# Patient Record
Sex: FEMALE | Race: WHITE
[De-identification: names, ages, dates, MRNs, and addresses within clinical notes are randomized per-mention and may not be internally consistent; named-entity substitution may affect disease eponyms.]

---

## 2017-09-18 ENCOUNTER — HOSPITAL ENCOUNTER (INPATIENT)
Dept: HOSPITAL 74 - JER | Age: 37
LOS: 5 days | Discharge: HOME | DRG: 391 | End: 2017-09-23
Attending: NURSE PRACTITIONER | Admitting: INTERNAL MEDICINE
Payer: COMMERCIAL

## 2017-09-18 VITALS — BODY MASS INDEX: 27.7 KG/M2

## 2017-09-18 DIAGNOSIS — F17.210: ICD-10-CM

## 2017-09-18 DIAGNOSIS — K70.9: ICD-10-CM

## 2017-09-18 DIAGNOSIS — K85.90: ICD-10-CM

## 2017-09-18 DIAGNOSIS — E83.42: ICD-10-CM

## 2017-09-18 DIAGNOSIS — R10.11: ICD-10-CM

## 2017-09-18 DIAGNOSIS — F10.129: ICD-10-CM

## 2017-09-18 DIAGNOSIS — K29.50: ICD-10-CM

## 2017-09-18 DIAGNOSIS — E87.6: ICD-10-CM

## 2017-09-18 DIAGNOSIS — K76.0: ICD-10-CM

## 2017-09-18 DIAGNOSIS — K21.9: Primary | ICD-10-CM

## 2017-09-18 DIAGNOSIS — D50.9: ICD-10-CM

## 2017-09-18 PROCEDURE — G0009 ADMIN PNEUMOCOCCAL VACCINE: HCPCS

## 2017-09-18 PROCEDURE — G0008 ADMIN INFLUENZA VIRUS VAC: HCPCS

## 2017-09-18 PROCEDURE — G0378 HOSPITAL OBSERVATION PER HR: HCPCS

## 2017-09-19 LAB
ALBUMIN SERPL-MCNC: 2.8 G/DL (ref 3.4–5)
ALBUMIN SERPL-MCNC: 3.4 G/DL (ref 3.4–5)
ALP SERPL-CCNC: 198 U/L (ref 45–117)
ALP SERPL-CCNC: 231 U/L (ref 45–117)
ALT SERPL-CCNC: 179 U/L (ref 12–78)
ALT SERPL-CCNC: 212 U/L (ref 12–78)
ANION GAP SERPL CALC-SCNC: 11 MMOL/L (ref 8–16)
ANION GAP SERPL CALC-SCNC: 5 MMOL/L (ref 8–16)
ANISOCYTOSIS BLD QL: (no result)
APPEARANCE UR: CLEAR
AST SERPL-CCNC: 284 U/L (ref 15–37)
AST SERPL-CCNC: 507 U/L (ref 15–37)
BASOPHILS # BLD: 0.5 % (ref 0–2)
BASOPHILS # BLD: 0.8 % (ref 0–2)
BILIRUB SERPL-MCNC: 0.7 MG/DL (ref 0.2–1)
BILIRUB SERPL-MCNC: 0.9 MG/DL (ref 0.2–1)
BILIRUB UR STRIP.AUTO-MCNC: NEGATIVE MG/DL
CALCIUM SERPL-MCNC: 7.4 MG/DL (ref 8.5–10.1)
CALCIUM SERPL-MCNC: 7.9 MG/DL (ref 8.5–10.1)
CO2 SERPL-SCNC: 25 MMOL/L (ref 21–32)
CO2 SERPL-SCNC: 27 MMOL/L (ref 21–32)
COLOR UR: (no result)
CREAT SERPL-MCNC: 0.5 MG/DL (ref 0.55–1.02)
CREAT SERPL-MCNC: 0.7 MG/DL (ref 0.55–1.02)
DEPRECATED RDW RBC AUTO: 20.5 % (ref 11.6–15.6)
DEPRECATED RDW RBC AUTO: 21.2 % (ref 11.6–15.6)
EOSINOPHIL # BLD: 1.3 % (ref 0–4.5)
EOSINOPHIL # BLD: 1.8 % (ref 0–4.5)
GLUCOSE SERPL-MCNC: 64 MG/DL (ref 74–106)
GLUCOSE SERPL-MCNC: 84 MG/DL (ref 74–106)
HYPOCHROMIA BLD QL SMEAR: (no result)
KETONES UR QL STRIP: NEGATIVE
LEUKOCYTE ESTERASE UR QL STRIP.AUTO: NEGATIVE
MACROCYTES BLD QL: (no result)
MCH RBC QN AUTO: 19.6 PG (ref 25.7–33.7)
MCH RBC QN AUTO: 20 PG (ref 25.7–33.7)
MCHC RBC AUTO-ENTMCNC: 29.3 G/DL (ref 32–36)
MCHC RBC AUTO-ENTMCNC: 30.2 G/DL (ref 32–36)
MCV RBC: 66.3 FL (ref 80–96)
MCV RBC: 67 FL (ref 80–96)
MICROCYTES BLD QL SMEAR: (no result)
NEUTROPHILS # BLD: 53.1 % (ref 42.8–82.8)
NEUTROPHILS # BLD: 65.9 % (ref 42.8–82.8)
NITRITE UR QL STRIP: NEGATIVE
PH UR: 6 [PH] (ref 5–8)
PLATELET # BLD AUTO: 221 K/MM3 (ref 134–434)
PLATELET # BLD AUTO: 251 K/MM3 (ref 134–434)
PLATELET # BLD EST: ADEQUATE 10*3/UL
PMV BLD: 8.7 FL (ref 7.5–11.1)
PMV BLD: 9.1 FL (ref 7.5–11.1)
PROT SERPL-MCNC: 6.1 G/DL (ref 6.4–8.2)
PROT SERPL-MCNC: 6.9 G/DL (ref 6.4–8.2)
PROT UR QL STRIP: NEGATIVE
PROT UR QL STRIP: NEGATIVE
RBC # UR STRIP: NEGATIVE /UL
SP GR UR: 1.01 (ref 1–1.02)
URINE MARIJUANA THC: NEGATIVE NG/ML
UROBILINOGEN UR STRIP-MCNC: NEGATIVE MG/DL (ref 0.2–1)
WBC # BLD AUTO: 4.8 K/MM3 (ref 4–10)
WBC # BLD AUTO: 5.3 K/MM3 (ref 4–10)

## 2017-09-19 RX ADMIN — SODIUM CHLORIDE SCH MLS/HR: 9 INJECTION, SOLUTION INTRAVENOUS at 16:45

## 2017-09-19 RX ADMIN — MORPHINE SULFATE PRN MG: 2 INJECTION, SOLUTION INTRAMUSCULAR; INTRAVENOUS at 14:09

## 2017-09-19 RX ADMIN — MORPHINE SULFATE PRN MG: 2 INJECTION, SOLUTION INTRAMUSCULAR; INTRAVENOUS at 17:52

## 2017-09-19 RX ADMIN — MORPHINE SULFATE PRN MG: 2 INJECTION, SOLUTION INTRAMUSCULAR; INTRAVENOUS at 21:46

## 2017-09-19 RX ADMIN — MORPHINE SULFATE PRN MG: 2 INJECTION, SOLUTION INTRAMUSCULAR; INTRAVENOUS at 11:13

## 2017-09-19 NOTE — PDOC
Attending Attestation





- Resident


Resident Name: Hansel Villanueva





- ED Attending Attestation


I have performed the following: I have examined & evaluated the patient, The 

case was reviewed & discussed with the resident, I agree w/resident's findings 

& plan, Exceptions are as noted





- HPI


HPI: 





09/19/17 06:02


36 yo F with h/o gastric bypass, here wtih c/o etoh intox, vague abd pain n/v. 

states pain epigastric no radiation. has had two episodes nonbloody nonbilious 

emesis. no mod factors. reports etoh today drinnking bicardi. denies daily etoh 

use or other drug use. no change to stool. no urinary complaints.











- Physicial Exam


PE: 





09/19/17 06:02


awake, intoxicated. perrl. dry mucous membranes. lungs clear bilat. heart rrr 

no mrg. abd soft mild epigastric, ruq ttp. no revound or guarding. no cva 

tendernss. nuero awake but drowsy, slurred speech.











- Medical Decision Making


09/19/17 06:03


differential etoh intox, gastritis pancreatitis cholelithiaisis cholecystitis. 

plan ivf labs antiemetics. pain control ct a/p r/o obstruciton due to gastric 

surgery in past. 





09/19/17 06:04


d/w molly ( covering for [facility]), hasn't seen pt in over year. told to admit to 

hospitalist. noted mild pancreatitis. ct a/p unremarkable.

## 2017-09-19 NOTE — CONSULT
Consult Detox [facility]


Reason for Current Admission/Consult: Alcohol detox


Referred by:: Perri Parker NP





- History


History of Present Illness: 


38 y/o woman with a long hx. of alcoholism is admitted because of diffuse 

abdominal pain resulting from pancreatitis lipase 487 & .2.





- History Source


History Provided By: Medical Record





- Alcohol/Substance Use


Hx Alcohol Use: Yes





- Current Drug/Alcohol Use


  ** Alcohol


Route: Oral


Frequency: Daily


Amount used: Vodka 1/2- 1 pint


Age of first use: 20


Date of Last Use: 09/19/17





- Past Medical History


...LMP: 08/11/17





- Significant


Medical Findings: 


 Laboratory Tests











  09/19/17 09/19/17 09/19/17





  02:08 02:08 02:08


 


WBC  5.3  D  


 


RBC  4.07  D  


 


Hgb  8.1 L D  


 


Hct  26.9 L D  


 


MCV  66.3 L  


 


MCH  20.0 L  


 


MCHC  30.2 L  


 


RDW  20.5 H D  


 


Plt Count  251  


 


MPV  9.1  


 


Neutrophils %  65.9  


 


Lymphocytes %  27.2  


 


Monocytes %  5.1  


 


Eosinophils %  1.3  


 


Basophils %  0.5  


 


Hypochromia  2+  


 


Platelet Estimate  Adequate  


 


Anisocytosis  1+  


 


Microcytosis  1+  


 


Macrocytosis  1+  


 


Sodium   144 


 


Potassium   3.2 L 


 


Chloride   108 H 


 


Carbon Dioxide   25 


 


Anion Gap   11 


 


BUN   7 


 


Creatinine   0.7  D 


 


Creat Clearance w eGFR   > 60 


 


Random Glucose   84 


 


Calcium   7.9 L 


 


Phosphorus   


 


Magnesium   


 


Total Bilirubin   0.9  D 


 


AST   284 H D 


 


ALT   179 H D 


 


Alkaline Phosphatase   231 H D 


 


Total Protein   6.9 


 


Albumin   3.4 


 


Lipase   487 H 


 


Urine Color   


 


Urine Appearance   


 


Urine pH   


 


Ur Specific Gravity   


 


Urine Protein   


 


Urine Glucose (UA)   


 


Urine Ketones   


 


Urine Blood   


 


Urine Nitrite   


 


Urine Bilirubin   


 


Urine Urobilinogen   


 


Urine HCG, Qual   


 


Opiates Screen   


 


Methadone Screen   


 


Barbiturate Screen   


 


Phencyclidine Screen   


 


Ur Amphetamines Screen   


 


MDMA (Ecstasy) Screen   


 


Benzodiazepines Screen   


 


Cocaine Screen   


 


U Marijuana (THC) Screen   


 


Alcohol, Quantitative    290.2 H*














  09/19/17 09/19/17 09/19/17





  02:10 02:10 02:10


 


WBC   


 


RBC   


 


Hgb   


 


Hct   


 


MCV   


 


MCH   


 


MCHC   


 


RDW   


 


Plt Count   


 


MPV   


 


Neutrophils %   


 


Lymphocytes %   


 


Monocytes %   


 


Eosinophils %   


 


Basophils %   


 


Hypochromia   


 


Platelet Estimate   


 


Anisocytosis   


 


Microcytosis   


 


Macrocytosis   


 


Sodium   


 


Potassium   


 


Chloride   


 


Carbon Dioxide   


 


Anion Gap   


 


BUN   


 


Creatinine   


 


Creat Clearance w eGFR   


 


Random Glucose   


 


Calcium   


 


Phosphorus   


 


Magnesium   


 


Total Bilirubin   


 


AST   


 


ALT   


 


Alkaline Phosphatase   


 


Total Protein   


 


Albumin   


 


Lipase   


 


Urine Color  Ltyellow  


 


Urine Appearance  Clear  


 


Urine pH  6.0  


 


Ur Specific Gravity  1.010  


 


Urine Protein  Negative  


 


Urine Glucose (UA)  Negative  


 


Urine Ketones  Negative  


 


Urine Blood  Negative  


 


Urine Nitrite  Negative  


 


Urine Bilirubin  Negative  


 


Urine Urobilinogen  Negative  


 


Urine HCG, Qual   Negative 


 


Opiates Screen    Negative


 


Methadone Screen    Negative


 


Barbiturate Screen    Negative


 


Phencyclidine Screen    Negative


 


Ur Amphetamines Screen    Negative


 


MDMA (Ecstasy) Screen    Negative


 


Benzodiazepines Screen    Negative


 


Cocaine Screen    Negative


 


U Marijuana (THC) Screen    Negative


 


Alcohol, Quantitative   














  09/19/17 09/19/17 09/19/17





  10:10 10:10 10:10


 


WBC    4.8


 


RBC    3.91


 


Hgb    7.7 L


 


Hct    26.2 L


 


MCV    67.0 L


 


MCH    19.6 L


 


MCHC    29.3 L


 


RDW    21.2 H


 


Plt Count    221


 


MPV    8.7


 


Neutrophils %    53.1


 


Lymphocytes %    38.9  D


 


Monocytes %    5.4


 


Eosinophils %    1.8


 


Basophils %    0.8


 


Hypochromia   


 


Platelet Estimate   


 


Anisocytosis   


 


Microcytosis   


 


Macrocytosis   


 


Sodium   145 


 


Potassium   3.7 


 


Chloride   113 H 


 


Carbon Dioxide   27 


 


Anion Gap   5 L 


 


BUN   5 L D 


 


Creatinine   0.5 L D 


 


Creat Clearance w eGFR   > 60 


 


Random Glucose   64 L D 


 


Calcium   7.4 L 


 


Phosphorus   


 


Magnesium  1.8  


 


Total Bilirubin   0.7  D 


 


AST   507 H D 


 


ALT   212 H 


 


Alkaline Phosphatase   198 H 


 


Total Protein   6.1 L 


 


Albumin   2.8 L 


 


Lipase   


 


Urine Color   


 


Urine Appearance   


 


Urine pH   


 


Ur Specific Gravity   


 


Urine Protein   


 


Urine Glucose (UA)   


 


Urine Ketones   


 


Urine Blood   


 


Urine Nitrite   


 


Urine Bilirubin   


 


Urine Urobilinogen   


 


Urine HCG, Qual   


 


Opiates Screen   


 


Methadone Screen   


 


Barbiturate Screen   


 


Phencyclidine Screen   


 


Ur Amphetamines Screen   


 


MDMA (Ecstasy) Screen   


 


Benzodiazepines Screen   


 


Cocaine Screen   


 


U Marijuana (THC) Screen   


 


Alcohol, Quantitative   














  09/20/17 09/20/17 09/20/17





  06:00 06:00 06:00


 


WBC  4.4  


 


RBC  3.88  


 


Hgb  7.7 L  


 


Hct  26.1 L  


 


MCV  67.2 L  


 


MCH  19.7 L  


 


MCHC  29.3 L  


 


RDW  20.7 H  


 


Plt Count  184  


 


MPV  8.8  


 


Neutrophils %   


 


Lymphocytes %   


 


Monocytes %   


 


Eosinophils %   


 


Basophils %   


 


Hypochromia   


 


Platelet Estimate   


 


Anisocytosis   


 


Microcytosis   


 


Macrocytosis   


 


Sodium   141 


 


Potassium   3.4 L 


 


Chloride   110 H 


 


Carbon Dioxide   26 


 


Anion Gap   5 L 


 


BUN   5 L 


 


Creatinine   0.5 L 


 


Creat Clearance w eGFR   > 60 


 


Random Glucose   57 L 


 


Calcium   7.7 L 


 


Phosphorus   3.1 


 


Magnesium   1.5 L 


 


Total Bilirubin   1.3 H D 


 


AST   231 H D 


 


ALT   146 H D 


 


Alkaline Phosphatase   183 H 


 


Total Protein   5.6 L 


 


Albumin   2.6 L 


 


Lipase    178


 


Urine Color   


 


Urine Appearance   


 


Urine pH   


 


Ur Specific Gravity   


 


Urine Protein   


 


Urine Glucose (UA)   


 


Urine Ketones   


 


Urine Blood   


 


Urine Nitrite   


 


Urine Bilirubin   


 


Urine Urobilinogen   


 


Urine HCG, Qual   


 


Opiates Screen   


 


Methadone Screen   


 


Barbiturate Screen   


 


Phencyclidine Screen   


 


Ur Amphetamines Screen   


 


MDMA (Ecstasy) Screen   


 


Benzodiazepines Screen   


 


Cocaine Screen   


 


U Marijuana (THC) Screen   


 


Alcohol, Quantitative   














CIWA Score





- CIWA Score


Nausea/Vomiting: 3


Muscle Tremors: 4-Moderate,w/Arms Extend


Anxiety: 4-Mod. Anxious/Guarded


Agitation: 4-Moderately Restless


Paroxysmal Sweats: 3


Orientation: 0-Oriented


Tacttile Disturbances: 0-None


Auditory Disturbances: 0-None


Visual Disturbances: 0-None


Headache: 0-None Present


CIWA-Ar Total Score: 18





Assessment Plan





- Plan


Plan: 


Detox with Librium





- Medication


Detox Regimen/Protocol: Librium

## 2017-09-19 NOTE — PDOC
History of Present Illness





- General


Chief Complaint: Pain


Stated Complaint: UPPER ABDOMINAL PAIN


Time Seen by Provider: 17 01:31


History Source: Patient


Exam Limitations: Intoxication





- History of Present Illness


Initial Comments: 


17 02:02


Patient is a 38 yo female with history of multiple abdominal surgeries 

including gastric bypass, appendicitis and  presenting with acute 

intoxication, nausea, vomiting and abdominal pain for an unknown length of 

time. Patient is a poor historian and endorses drinking multiple drinks this 

evening.  She is c/o diffuse abdominal pain primarily in the epigastric and 

right upper and lower quadrants. Patient denies fever, chills, diarrhea, 

constipation.  Last BM was yesterday.





PCP: Dr. Anjel Shaffer








Past History





- Past Medical History


Allergies/Adverse Reactions: 


 Allergies











Allergy/AdvReac Type Severity Reaction Status Date / Time


 


No Known Allergies Allergy   Verified 17 23:44











Home Medications: 


Ambulatory Orders





Multivitamin [One Daily] 1 each PO DAILY 17 


Docusate Sodium [Colace -] 100 mg PO TID #60 tab 17 


Folic Acid - 1 mg PO DAILY #30 tablet 17 


Oxycodone HCl 5 mg PO BID #10 tablet MDD 2 tabls 17 


Oxycodone HCl [Roxicodone -] 5 mg PO Q4H PRN #7 tablet MDD 2 tablets 17 


Pantoprazole Sodium [Protonix -] 40 mg PO DAILY #30 tab 17 


Thiamine HCl [Vitamin B1 -] 100 mg PO DAILY #30 tablet 17 








GI Disorders: Yes (ACID REFLUX)





- Surgical History


Abdominal Surgery: Yes (GASTIC BYPASS-10/07/13)


Appendectomy: Yes





- Reproductive History


 (#): 7


Para: 8


Spontaneous : 0





- Suicide/Smoking/Psychosocial Hx


Smoking Status: No


Smoking History: Current every day smoker


Have you smoked in the past 12 months: Yes


Number of Cigarettes Smoked Daily: 2


Information on smoking cessation initiated: No


Hx Alcohol Use: No





**Review of Systems





- Review of Systems


Able to Perform ROS?: Yes (Limited d/t intoxication)


Comments:: 


17 02:23


Denies fever, chills


Denies shortness of breath


Denies chest pain


Endorses abdominal pain, nausea, vomiting; denies diarrhea constipation





Is the patient limited English proficient: No





*Physical Exam





- Vital Signs


 Last Vital Signs











Temp Pulse Resp BP Pulse Ox


 


 97.9 F   92 H  18   140/104   99 


 


 17 23:41  17 23:41  17 23:41  17 23:41  17 23:41














- Physical Exam


Comments: 


17 02:28


GENERAL: AAOx3, obese, intoxicates and smelling of EtOH, NAD


HEAD: NCAT 


EYES: PERRLA, EOMI, conjunctiva injected


ENT: hearing grossly normal, nares patent, no nasal discharge, no congestion, 

dry oral mucosa


NECK: supple, normal ROM, no LAD


RESP: speaking in full sentences, symmetrical chest expansion, no respiratory 

distress, lungs CTAB 


HEART: RRR, normal S1-S2, no MRG 


ABDOMEN: soft, ttp epigastric/RUQ/RLQ, no guarding, no rebound.  


EXTREMITIES: Moving all extremities, normal ROM, strength 5/5, normal sensation 

intact. 


NEUROLOGICAL: CN II-XII grossly intact, slurred speech, normal gait, no focal 

sensorimotor deficits 


SKIN: warm, dry, normal turgor, no rashes or lesions noted.








ED Treatment Course





- LABORATORY


CBC & Chemistry Diagram: 


 17 06:05





 17 06:05





- RADIOLOGY


Radiograph Interpretation: 


CT/ABDOMEN   PELVIS CT WITH CONTR 


 


 


HISTORY PROVIDED: Abdominal pain and vomiting. 


 


 Sequential axial images were obtained from the domes of the diaphragms through 

the symphysis pubis 


following the administration of both oral and intravenous contrast material. 


 


 The lung bases are clear. The patient is S/P gastric sleeve procedure. 


 


 The liver is normal size. It is markedly hypodense in texture consistent with 

diffuse fatty 


infiltration. No mass lesions are noted within the liver. 


 


 The spleen, pancreas and adrenal glands demonstrate no significant 

abnormalities. There is 


prominence of the renal pelves and proximal ureters bilaterally with no obvious 

obstruction 


identified. 


 


 There is no evidence of bowel obstruction, however, there are mild edematous 

changes within the 


central mesentery with clumping of small bowel loops anteriorly. A surgical 

suture line is also 


noted in this location and these changes could be postoperative in nature. 

Clinical correlation and 


follow-up is advised. 


 


  There is no evidence of intra-abdominal or retroperitoneal lymphadenopathy or 

fluid collections. 


 


 Examination of the pelvis demonstrates changes within the left adnexa. There 

is trace amount of 


free pelvic fluid noted as well. No pelvic masses are identified. 


 


 There is no evidence of acute bony abnormalities. 


 


 


 IMPRESSION: 


 


 1. Extensive diffuse fatty infiltration of the liver. 


 2. Prominent renal pelves and proximal ureters without obvious obstruction. 


 3. S/P gastric sleeve procedure with small bowel anastomosis also noted. Mild 

mesenteric edema is 


seen with clumping of small bowel loops anteriorly. No obvious bowel 

obstruction is present. 


Clinical correlation and follow-up recommended. 


 4. Cystic changes left adnexa with free pelvic fluid. Please see above 

discussion. 


 











Medical Decision Making





- Medical Decision Making


17 02:11


38 yo F with history of gastric bypass surgery and abdominal pain


Ddx is pancreatitis, biliary colic, cholecystitis, gastritis, abdominal 

strictures, bowel obstruction


Plan: 


Pain management


Fluids


Evaluate with basic labs, Mg, lipase, HCG


Consider CT to r.o obstruction 





17 03:49


Labs reviewed. Significant for for elevated lipase and LFT's consistent with 

pancreatitis





 CMP











Sodium  144 mmol/L (136-145)   17  02:08    


 


Potassium  3.2 mmol/L (3.5-5.1)  L  17  02:08    


 


Chloride  108 mmol/L ()  H  17  02:08    


 


Carbon Dioxide  25 mmol/L (21-32)   17  02:08    


 


Anion Gap  11  (8-16)   17  02:08    


 


BUN  7 mg/dL (7-18)   17  02:08    


 


Creatinine  0.7 mg/dL (0.55-1.02)  D 17  02:08    


 


Creat Clearance w eGFR  > 60  (>60)   17  02:08    


 


Random Glucose  84 mg/dL ()   17  02:08    


 


Calcium  7.9 mg/dL (8.5-10.1)  L  17  02:08    


 


Total Bilirubin  0.9 mg/dL (0.2-1.0)  D 17  02:08    


 


AST  284 U/L (15-37)  H D 17  02:08    


 


ALT  179 U/L (12-78)  H D 17  02:08    


 


Alkaline Phosphatase  231 U/L ()  H D 17  02:08    


 


Total Protein  6.9 g/dl (6.4-8.2)   17  02:08    


 


Albumin  3.4 g/dl (3.4-5.0)   17  02:08    


 


Lipase  487 U/L ()  H  17  02:08    














17 05:14


CT Abdomen/Pelvic shows fatty liver and moderate degree of nonspecific 

mesenteric edema without evidence of venous thrombosis or


bowel inflammation. No obvious obstruction.





Patient with mild pancreatitis, nausea and vomiting and intoxication, needs obs 

admission





17 05:39


Spoke with Dr. Lemons who admits for Dr. Anjel Shaffer. She requested patient be 

admitted to hospitalist.





17 06:02


Per hospitalist, admitted to [facility] under [facility]








*DC/Admit/Observation/Transfer


Diagnosis at time of Disposition: 


 Intoxication





Pancreatitis


Qualifiers:


 Chronicity: acute Pancreatitis type: alcohol induced Acute pancreatitis 

complication: unspecified Qualified Code(s): K85.20 - Alcohol induced acute 

pancreatitis without necrosis or infection





Vomiting


Qualifiers:


 Vomiting type: unspecified Vomiting Intractability: unspecified Nausea presence

: with nausea Qualified Code(s): R11.2 - Nausea with vomiting, unspecified





- Discharge Dispostion


Disposition: HOME


Condition at time of disposition: Improved


Admit: Yes





- Prescriptions





- Referrals

## 2017-09-19 NOTE — HP
CHIEF COMPLAINT: abdominal pain/nausea/vomiting





PCP: Anjel Shaffer MD





HISTORY OF PRESENT ILLNESS:





Patient is a 37 year old female with a significant past medical history of 

gastric bypass (in 2013 @ [place]), appendicitis and .  She 

presented to the ED with complaints of acute alcohol intoxication, abdominal 

pain, nausea and vomiting for a few days (unable to quantify when symptoms 

started).  Patient is a poor historian and endorses drinking multiple drinks 

this evening.  She is c/o diffuse abdominal pain primarily in the epigastric 

and right upper and lower quadrants.  States the pain is a 8/10 and and 

describes the pain as "stabbing, cramping" pain.  Patient denies fever, chills, 

diarrhea, constipation.  Last BM was yesterday.





She appears intoxicated and withdrawn on my exam. States she drinks every other 

day but could not quantify the amount she drinks but drinks Vodka often.  She 

denies any symptoms of withdrawal, denies any anxiety, agitation, headache, 

tremors denies hx of seizures.  Denies suicidal ideation or hallucinations.





ER course was notable for:


(1) appears intoxicated on exam, +alcohol odor


(2) hmg 7.7/26.2, vitals stable


(3) ast 505 alt 212, Lipase 487


(4) ETOH tox 290.2


(5) CIWA score 4, low score: continue to monitor CIWA q6 and PRN





Recent Travel: denies





PAST MEDICAL HISTORY: gastric bypass, appendicitis, c section





PAST SURGICAL HISTORY:





Social History:


Smoking: denies


Alcohol: Patient states she drinks vodka every other day.  


Drugs: denies





Family History:


Allergies





No Known Allergies Allergy (Verified 17 23:44)


 








HOME MEDICATIONS:


 Home Medications











 Medication  Instructions  Recorded


 


Multivitamin [One Daily] 1 each PO DAILY 17








REVIEW OF SYSTEMS


CONSTITUTIONAL: 


Absent:  fever, chills, diaphoresis, generalized weakness, malaise, loss of 

appetite, weight change


HEENT: 


Absent:  rhinorrhea, nasal congestion, throat pain, throat swelling, difficulty 

swallowing, mouth swelling, ear pain, eye pain, visual changes


CARDIOVASCULAR: 


Absent: chest pain, syncope, palpitations, irregular heart rate, lightheadedness

, peripheral edema


RESPIRATORY: 


Absent: cough, shortness of breath, dyspnea with exertion, orthopnea, wheezing, 

stridor, hemoptysis


GASTROINTESTINAL:


Absent: abdominal pain, abdominal distension, nausea, vomiting, diarrhea, 

constipation, melena, hematochezia


GENITOURINARY: 


Absent: dysuria, frequency, urgency, hesitancy, hematuria, flank pain, genital 

pain


MUSCULOSKELETAL: 


Absent: myalgia, arthralgia, joint swelling, back pain, neck pain


SKIN: 


Absent: rash, itching, pallor


HEMATOLOGIC/IMMUNOLOGIC: 


Absent: easy bleeding, easy bruising, lymphadenopathy, frequent infections


ENDOCRINE:


Absent: unexplained weight gain, unexplained weight loss, heat intolerance, 

cold intolerance


NEUROLOGIC: 


Absent: headache, focal weakness or paresthesias, dizziness, unsteady gait, 

seizure, mental status changes, bladder or bowel incontinence


PSYCHIATRIC: 


Absent: suicidal or homicidal ideation, hallucinations.








PHYSICAL EXAMINATION


 Vital Signs - 24 hr











  17





  07:02 09:31


 


Pulse Rate [ 98 H 54 L





Apical]  


 


Respiratory 18 18





Rate  


 


Blood Pressure 158/84 134/73





[Left Arm]  


 


O2 Sat by Pulse 100 100





Oximetry (%)  








GENERAL: Awake, alert, and fully oriented, in no acute distress.


HEAD: Normal with no signs of trauma.


EYES: Pupils equal, round and reactive to light, extraocular movements intact, 

sclera anicteric, conjunctiva clear. No lid lag.


EARS, NOSE, THROAT: Ears normal, nares patent, oropharynx clear without 

exudates. Moist mucous membranes.


NECK: Normal range of motion, supple without lymphadenopathy, JVD, or masses.


LUNGS: Breath sounds equal, clear to auscultation bilaterally. No wheezes, and 

no crackles. No accessory muscle use.


HEART: Regular rate and rhythm, normal S1 and S2 without murmur, rub or gallop.


ABDOMEN: Soft, +tender on ruq, not distended, normoactive bowel sounds, + RUQ 

guarding, no rebound, no masses, last BM yesterday


MUSCULOSKELETAL: Normal range of motion at all joints. No bony deformities or 

tenderness. No CVA tenderness.


UPPER EXTREMITIES: 2+ pulses, warm, well-perfused. No cyanosis. No clubbing. No 

peripheral edema.


LOWER EXTREMITIES: 2+ pulses, warm, well-perfused. No calf tenderness. No 

peripheral edema. 


NEUROLOGICAL:  Cranial nerves II-XII intact. Normal speech. Normal gait.


PSYCHIATRIC:  Appears acutely intoxicated, withdrawn, guarded


SKIN:  Bilteral lower ext scattered bruising, unclear etiology, denies trauma, 

no edema


 Laboratory Results - last 24 hr











  17





  10:10 10:10 10:10


 


WBC    4.8


 


RBC    3.91


 


Hgb    7.7 L


 


Hct    26.2 L


 


MCV    67.0 L


 


MCH    19.6 L


 


MCHC    29.3 L


 


RDW    21.2 H


 


Plt Count    221


 


MPV    8.7


 


Neutrophils %    53.1


 


Lymphocytes %    38.9  D


 


Monocytes %    5.4


 


Eosinophils %    1.8


 


Basophils %    0.8


 


Sodium   145 


 


Potassium   3.7 


 


Chloride   113 H 


 


Carbon Dioxide   27 


 


Anion Gap   5 L 


 


BUN   5 L D 


 


Creatinine   0.5 L D 


 


Creat Clearance w eGFR   > 60 


 


Random Glucose   64 L D 


 


Calcium   7.4 L 


 


Magnesium  1.8  


 


Total Bilirubin   0.7  D 


 


AST   507 H D 


 


ALT   212 H 


 


Alkaline Phosphatase   198 H 


 


Total Protein   6.1 L 


 


Albumin   2.8 L 











ASSESSMENT/PLAN:





Patient is a 37 year old female with a significant past medical history of 

gastric bypass (2013 @ [place]), appendicitis and .  She presented 

to the ED with complaints of acute alcohol intoxication, abdominal pain, nausea 

and vomiting for a few days (unable to quantify when symptoms started).  

Patient is a poor historian and endorses drinking multiple drinks this evening.

  She is c/o diffuse abdominal pain primarily in the epigastric and right upper 

and lower quadrants.  States the pain is a 8/10 and and describes the pain as 

"stabbing, cramping" pain.  Patient denies fever, chills, diarrhea, 

constipation.  Last BM was yesterday.





She appears intoxicated and withdrawn on my exam. States she drinks every other 

day but could not quantify the amount she drinks but drinks Vodka often.  She 

denies any symptoms of withdrawal, denies any anxiety, agitation, headache, 

tremors denies hx of seizures.  Denies suicidal ideation or hallucinations.





GI:


Abdominal Pain/Nausea/Vomiting - acute


A/P: Abdominal CT/Pelvis with contrast shows: (1) extensive diffuse fatty 

infiltration of the liver, (2) Prominent renal pelves andproximal ureters 

without obvious obstruction (3) s/p gastric sleeve procedure with small bowel 

anastomosis also noted.  Mild mesenteric edema is seen with clumping of small 

bowel loops anteriorly.  No obvious bowel obstruction is present.  (4) cystic 

changes left adnexa with free pelvic fluid.


Will give IVF/clear liquid diet and monitor n/v


Zofran SL as needed for nausea/vomiting


Consider GI consult if worsening liver enzymes or worsening abdominal pain





Elevated AST/ALT - acute


A/P: Ast 505 alt @12, Lipase 487


AST/ALT not this elevated on previously admissions


She is also intoxicated in the ED with etoh tox 290


Will give IVF, banana bag x 1 repeat labs in a.m.


Abdominal CT shows extensive diffuse fatty inf. of liver





Psyche:


Acute alcohol intoxication/ETOH tox 290


A/P: Low CIWA score on my exam


Monitor for signs of withdrawal and may need Libirum


Will give PRN dose now





Hematology:


Acute anemia


A/P: Likely secondary to ETOH abuse


Monitor in the abstinence of alcohol





F.E.N.


Fluids: NS @ 100 x 24 hours


Electrolytes: monitor in a.m.


Nutrition: clears





Disposition:  Monitor patient on OBS.  Full code. 


























Visit type





- Emergency Visit


Emergency Visit: Yes


ED Registration Date: 17


Care time: The patient presented to the Emergency Department on the above date 

and was hospitalized for further evaluation of their emergent condition.





- New Patient


This patient is new to me today: Yes


Date on this admission: 17





- Critical Care


Critical Care patient: No

## 2017-09-20 LAB
ALBUMIN SERPL-MCNC: 2.6 G/DL (ref 3.4–5)
ALP SERPL-CCNC: 183 U/L (ref 45–117)
ALT SERPL-CCNC: 146 U/L (ref 12–78)
ANION GAP SERPL CALC-SCNC: 5 MMOL/L (ref 8–16)
AST SERPL-CCNC: 231 U/L (ref 15–37)
BILIRUB SERPL-MCNC: 1.3 MG/DL (ref 0.2–1)
CALCIUM SERPL-MCNC: 7.7 MG/DL (ref 8.5–10.1)
CO2 SERPL-SCNC: 26 MMOL/L (ref 21–32)
CREAT SERPL-MCNC: 0.5 MG/DL (ref 0.55–1.02)
DEPRECATED RDW RBC AUTO: 20.7 % (ref 11.6–15.6)
GLUCOSE SERPL-MCNC: 57 MG/DL (ref 74–106)
MAGNESIUM SERPL-MCNC: 1.5 MG/DL (ref 1.8–2.4)
MCH RBC QN AUTO: 19.7 PG (ref 25.7–33.7)
MCHC RBC AUTO-ENTMCNC: 29.3 G/DL (ref 32–36)
MCV RBC: 67.2 FL (ref 80–96)
PHOSPHATE SERPL-MCNC: 3.1 MG/DL (ref 2.5–4.9)
PLATELET # BLD AUTO: 184 K/MM3 (ref 134–434)
PMV BLD: 8.8 FL (ref 7.5–11.1)
PROT SERPL-MCNC: 5.6 G/DL (ref 6.4–8.2)
WBC # BLD AUTO: 4.4 K/MM3 (ref 4–10)

## 2017-09-20 PROCEDURE — HZ2ZZZZ DETOXIFICATION SERVICES FOR SUBSTANCE ABUSE TREATMENT: ICD-10-PCS | Performed by: NURSE PRACTITIONER

## 2017-09-20 RX ADMIN — MORPHINE SULFATE PRN MG: 2 INJECTION, SOLUTION INTRAMUSCULAR; INTRAVENOUS at 06:35

## 2017-09-20 RX ADMIN — MORPHINE SULFATE PRN MG: 2 INJECTION, SOLUTION INTRAMUSCULAR; INTRAVENOUS at 10:27

## 2017-09-20 RX ADMIN — MORPHINE SULFATE PRN MG: 2 INJECTION, SOLUTION INTRAMUSCULAR; INTRAVENOUS at 14:56

## 2017-09-20 RX ADMIN — SODIUM CHLORIDE SCH MLS/HR: 9 INJECTION, SOLUTION INTRAVENOUS at 01:15

## 2017-09-20 RX ADMIN — SODIUM CHLORIDE SCH MLS/HR: 9 INJECTION, SOLUTION INTRAVENOUS at 10:23

## 2017-09-20 RX ADMIN — FOLIC ACID SCH MG: 1 TABLET ORAL at 10:18

## 2017-09-20 RX ADMIN — Medication SCH MG: at 10:18

## 2017-09-20 RX ADMIN — MORPHINE SULFATE PRN MG: 2 INJECTION, SOLUTION INTRAMUSCULAR; INTRAVENOUS at 02:37

## 2017-09-20 NOTE — CONSULT
- Consultation


REQUESTING PROVIDER: Art NP





CONSULT REQUEST: We have been asked to surgically evaluate this patient for 

management of nausea; vomiting; abdominal pain.





PCP:Dana Cordova





HISTORY OF PRESENT ILLNESS: CTSP for above c/o's; chart reviewed; previous 

admissions reviewed; patient admitted w/ ETOH intoxication; she has a h/o GBP; 

# dilatations; appendectomy among others; she has a h/o MRSA skin infection in 

the past. She states pain is in her right back; upper right thigh.





PMHx:   none       





PSHx:        as above


 Home Medications











 Medication  Instructions  Recorded


 


Multivitamin [One Daily] 1 each PO DAILY 09/19/17








 Allergies











Allergy/AdvReac Type Severity Reaction Status Date / Time


 


No Known Allergies Allergy   Verified 09/18/17 23:44








.





PHYSICAL EXAM:


GENERAL: Awake, alert, and fully oriented, in no acute distress; minimally 

cooperative


HEAD: Normal with no signs of trauma.


EYES: sclera anicteric, conjunctiva clear.


ABDOMEN: Soft, nontender, not distended, , no guarding, no rebound, no masses.  

No organomegaly. Healed port sites; no hernias.


MUSCULOSKELETAL: Normal ROM at all joints. No bony deformities or tenderness. 

No CVA tenderness.


UPPER EXTREMITIES: 2+ pulses, warm, well-perfused. No cyanosis. Cap refill <2 

seconds. No peripheral edema.


LOWER EXTREMITIES: 2+ pulses, warm, well-perfused. No calf tenderness. No 

peripheral edema. 


NEUROLOGICAL: Normal speech, gait normal.


PSYCH: Cooperative. Good eye contact. Appropriate mood and affect.


SKIN: Warm, dry, normal turgor, no rashes or lesions noted.


 Vital Signs











Temperature  98.3 F   09/20/17 19:04


 


Pulse Rate  110 H  09/20/17 19:04


 


Respiratory Rate  20   09/20/17 21:00


 


Blood Pressure  129/74   09/20/17 19:04


 


O2 Sat by Pulse Oximetry (%)  96   09/20/17 21:00








 Lab Results











WBC  4.4 K/mm3 (4.0-10.0)   09/20/17  06:00    


 


RBC  3.88 M/mm3 (3.60-5.2)   09/20/17  06:00    


 


Hgb  7.7 GM/dL (10.7-15.3)  L  09/20/17  06:00    


 


Hct  26.1 % (32.4-45.2)  L  09/20/17  06:00    


 


MCV  67.2 fl (80-96)  L  09/20/17  06:00    


 


MCHC  29.3 g/dl (32.0-36.0)  L  09/20/17  06:00    


 


RDW  20.7 % (11.6-15.6)  H  09/20/17  06:00    


 


Plt Count  184 K/MM3 (134-434)   09/20/17  06:00    


 


Sodium  141 mmol/L (136-145)   09/20/17  06:00    


 


Potassium  3.4 mmol/L (3.5-5.1)  L  09/20/17  06:00    


 


Chloride  110 mmol/L ()  H  09/20/17  06:00    


 


Carbon Dioxide  26 mmol/L (21-32)   09/20/17  06:00    


 


Anion Gap  5  (8-16)  L  09/20/17  06:00    


 


BUN  5 mg/dL (7-18)  L  09/20/17  06:00    


 


Creatinine  0.5 mg/dL (0.55-1.02)  L  09/20/17  06:00    


 


Random Glucose  57 mg/dL ()  L  09/20/17  06:00    


 


Calcium  7.7 mg/dL (8.5-10.1)  L  09/20/17  06:00    








CT; US Labs reviewed





IMP: abdominal pain most likely of origin of ETOH intox; previous GBP surgery 

and not from biliary tract and medical liver disease 





PLAN: Continue to trend LFT's; GI evaluation.





Tim Gallegos MD FACS





Visit type





- Case Type


Case Type: ED Admission





- Emergency


Emergency Visit: Yes


ED Registration Date: 09/20/17


Care time: The patient presented to the Emergency Department on the above date 

and was hospitalized for further evaluation of their emergent condition.





- New patient


This patient is new to me today: Yes


Date on this admission: 09/20/17





- Critical Care


Critical Care patient: No

## 2017-09-20 NOTE — PN
Physical Exam: 


SUBJECTIVE: Patient seen and examined. she reports small yellowish vomitus this 

AM, none since. She has r sided quadrant pain








OBJECTIVE:





 Vital Signs











 Period  Temp  Pulse  Resp  BP Sys/Landon  Pulse Ox


 


 Last 24 Hr  98.2 F  66  20  139/92  





PE


Neuro: alert, awake, cn 2-12intact


Pulm: CTAB


CV: s1 s2 rrr no mrg


Abd: RUQ tenderness refers to R flank and groin abd soft 


Ext: no le edema noted, warm 








Active Medications











Generic Name Dose Route Start Last Admin





  Trade Name Freq  PRN Reason Stop Dose Admin


 


Chlordiazepoxide HCl  25 mg 17 20:59  





  Librium -  PO 17 20:58  





  Q4H PRN   





  WITHDRAWAL(CONT SUBST)   


 


Chlordiazepoxide HCl  50 mg 17 23:00 17 10:19





  Librium -  PO 17 17:01  50 mg





  [id number] JOSE   Administration


 


Chlordiazepoxide HCl  25 mg 17 23:00  





  Librium -  PO 17 17:01  





  [id number] JOSE   


 


Chlordiazepoxide HCl  15 mg 17 23:00  





  Librium -  PO 17 17:01  





  [id number] JOSE   


 


Folic Acid  1 mg 17 10:00 17 10:18





  Folic Acid -  PO   1 mg





  DAILY JOSE   Administration


 


Lactated Ringer's  1,000 mls @ 125 mls/hr 17 13:30 17 14:54





  Lactated Ringers Solution  IV 17 21:29  125 mls/hr





  ASDIR JOSE   Administration


 


Morphine Sulfate  2 mg 17 10:24 17 14:56





  Morphine Injection -  IVPUSH   2 mg





  Q4H PRN   Administration





  PAIN   


 


Ondansetron HCl  4 mg 17 14:47  





  Zofran Odt -  SL   





  Q8H PRN   





  NAUSEA   


 


Thiamine HCl  100 mg 17 10:00 17 10:18





  Vitamin B1 -  PO   100 mg





  DAILY JOSE   Administration








Imaging: 


- CTAP: extensive diffuse fatty infiltration of the liver, (2) Prominent renal 

pelves and proximal ureters without obvious obstruction (3) s/p gastric sleeve 

procedure with small bowel anastomosis also noted.  Mild mesenteric edema is 

seen with clumping of small bowel loops anteriorly.  No obvious bowel 

obstruction is present.  (4) cystic changes left adnexa with free pelvic fluid.





Assessment: 37 year old female with pmhx gastric bypass (2013 @ [facility]), 

appendicitis and , ETOH abuse admitted with acute abdominal pain, n.v.





Plan:





1. Elevated LFT's


- Unclear if passing stones, CTAP and abd US negative for stones, or obstruction

, however GB is thickened 


- LFT's down trended today 


- Advance to full liquids 


- Surgery consulted 





2. ETOH abuse 


- Librium taper 


- Folic Acid daily 


- Thaiamine daily 





3. ? pancreatitis 


- Lipase improved


- Give 1L LR today x1





4. Anemia


- Likely of chronic disease


- Trend CBC 





Visit type





- Emergency Visit


Emergency Visit: Yes


ED Registration Date: 17


Care time: The patient presented to the Emergency Department on the above date 

and was hospitalized for further evaluation of their emergent condition.





- New Patient


This patient is new to me today: Yes


Date on this admission: 17





- Critical Care


Critical Care patient: No

## 2017-09-21 LAB
ALBUMIN SERPL-MCNC: 2.4 G/DL (ref 3.4–5)
ALP SERPL-CCNC: 186 U/L (ref 45–117)
ALT SERPL-CCNC: 128 U/L (ref 12–78)
ANION GAP SERPL CALC-SCNC: 5 MMOL/L (ref 8–16)
AST SERPL-CCNC: 217 U/L (ref 15–37)
BASOPHILS # BLD: 0.8 % (ref 0–2)
BILIRUB SERPL-MCNC: 1.4 MG/DL (ref 0.2–1)
CALCIUM SERPL-MCNC: 8 MG/DL (ref 8.5–10.1)
CO2 SERPL-SCNC: 28 MMOL/L (ref 21–32)
CREAT SERPL-MCNC: 0.4 MG/DL (ref 0.55–1.02)
DEPRECATED RDW RBC AUTO: 20.9 % (ref 11.6–15.6)
EOSINOPHIL # BLD: 2.1 % (ref 0–4.5)
GLUCOSE SERPL-MCNC: 68 MG/DL (ref 74–106)
INR BLD: 1.03 (ref 0.82–1.09)
MAGNESIUM SERPL-MCNC: 2.1 MG/DL (ref 1.8–2.4)
MCH RBC QN AUTO: 19.8 PG (ref 25.7–33.7)
MCHC RBC AUTO-ENTMCNC: 29.7 G/DL (ref 32–36)
MCV RBC: 66.8 FL (ref 80–96)
NEUTROPHILS # BLD: 65.5 % (ref 42.8–82.8)
PHOSPHATE SERPL-MCNC: 3.2 MG/DL (ref 2.5–4.9)
PLATELET # BLD AUTO: 198 K/MM3 (ref 134–434)
PMV BLD: 8.7 FL (ref 7.5–11.1)
PROT SERPL-MCNC: 5.2 G/DL (ref 6.4–8.2)
PT PNL PPP: 11.3 SEC (ref 9.98–11.88)
WBC # BLD AUTO: 4.4 K/MM3 (ref 4–10)

## 2017-09-21 RX ADMIN — MORPHINE SULFATE PRN MG: 2 INJECTION, SOLUTION INTRAMUSCULAR; INTRAVENOUS at 22:46

## 2017-09-21 RX ADMIN — SODIUM CHLORIDE SCH MLS/HR: 9 INJECTION, SOLUTION INTRAVENOUS at 17:32

## 2017-09-21 RX ADMIN — FOLIC ACID SCH MG: 1 TABLET ORAL at 09:33

## 2017-09-21 RX ADMIN — MORPHINE SULFATE PRN MG: 2 INJECTION, SOLUTION INTRAMUSCULAR; INTRAVENOUS at 17:33

## 2017-09-21 RX ADMIN — PANTOPRAZOLE SODIUM SCH MG: 40 TABLET, DELAYED RELEASE ORAL at 17:24

## 2017-09-21 RX ADMIN — Medication SCH MG: at 09:33

## 2017-09-21 RX ADMIN — MORPHINE SULFATE PRN MG: 2 INJECTION, SOLUTION INTRAMUSCULAR; INTRAVENOUS at 06:46

## 2017-09-21 NOTE — CONSULT
Consultation: 


REQUESTING PROVIDER:





CONSULT REQUEST: We have been asked to medically evaluate this patient for (

specify).





HISTORY OF PRESENT ILLNESS:











REVIEW OF SYSTEMS:


CONSTITUTIONAL: 


Absent:  fever, chills, diaphoresis, generalized weakness, malaise, loss of 

appetite, weight change


HEENT: 


Absent:  rhinorrhea, nasal congestion, throat pain, throat swelling, difficulty 

swallowing, mouth swelling, ear pain, eye pain, visual changes


CARDIOVASCULAR: 


Absent: chest pain, syncope, palpitations, irregular heart rate, lightheadedness

, peripheral edema


RESPIRATORY: 


Absent: cough, shortness of breath, dyspnea with exertion, orthopnea, wheezing, 

stridor, hemoptysis


GASTROINTESTINAL:


Absent: abdominal pain, abdominal distension, nausea, vomiting, diarrhea, 

constipation, melena, hematochezia


GENITOURINARY: 


Absent: dysuria, frequency, urgency, hesitancy, hematuria, flank pain, genital 

pain


MUSCULOSKELETAL: 


Absent: myalgia, arthralgia, joint swelling, back pain, neck pain


SKIN: 


Absent: rash, itching, pallor


HEMATOLOGIC/IMMUNOLOGIC: 


Absent: easy bleeding, easy bruising, lymphadenopathy, frequent infections


ENDOCRINE:


Absent: unexplained weight gain, unexplained weight loss, heat intolerance, 

cold intolerance


NEUROLOGIC: 


Absent: headache, focal weakness or paresthesias, dizziness, unsteady gait, 

seizure, mental status changes, bladder or bowel incontinence


PSYCHIATRIC: 


Absent: anxiety, depression, suicidal or homicidal ideation, hallucinations.





PHYSICAL EXAMINATION





 Vital Signs - 24 hr











  09/20/17 09/20/17 09/20/17





  19:04 21:00 23:00


 


Temperature 98.3 F  98.5 F


 


Pulse Rate 110 H  88


 


Respiratory 20 20 20





Rate   


 


Blood Pressure 129/74  132/70


 


O2 Sat by Pulse  96 





Oximetry (%)   














  09/21/17 09/21/17





  06:00 08:46


 


Temperature 98 F 98.3 F


 


Pulse Rate 79 72


 


Respiratory 20 18





Rate  


 


Blood Pressure 113/73 119/65


 


O2 Sat by Pulse  





Oximetry (%)  














GENERAL: Awake, alert, and fully oriented, in no acute distress.


HEAD: Normal with no signs of trauma.


EYES: Pupils equal, round and reactive to light, extraocular movements intact, 

sclera anicteric, conjunctiva clear. No lid lag.


EARS, NOSE, THROAT: Ears normal, nares patent, oropharynx clear without 

exudates. Moist mucous membranes.


NECK: Normal range of motion, supple without lymphadenopathy, JVD, or masses.


LUNGS: Breath sounds equal, clear to auscultation bilaterally. No wheezes, and 

no crackles. No accessory muscle use.


HEART: Regular rate and rhythm, normal S1 and S2 without murmur, rub or gallop.


ABDOMEN: Soft, nontender, not distended, normoactive bowel sounds, no guarding, 

no rebound, no masses.  No hepatomegaly or splenomegaly. 


MUSCULOSKELETAL: Normal range of motion at all joints. No bony deformities or 

tenderness. No CVA tenderness.


UPPER EXTREMITIES: 2+ pulses, warm, well-perfused. No cyanosis. No clubbing. 

Cap refill <2 seconds. No peripheral edema.


LOWER EXTREMITIES: 2+ pulses, warm, well-perfused. No calf tenderness. No 

peripheral edema. 


NEUROLOGICAL:  Cranial nerves II-XII intact. Normal speech. Normal gait.


PSYCHIATRIC: Cooperative. Good eye contact. Appropriate mood and affect.


SKIN: Warm, dry, normal turgor, no rashes or lesions noted. 











 Laboratory Results - last 24 hr











  09/21/17 09/21/17 09/21/17





  06:00 06:00 06:00


 


WBC  4.4  


 


RBC  3.66  


 


Hgb  7.3 L  


 


Hct  24.4 L  


 


MCV  66.8 L  


 


MCH  19.8 L  


 


MCHC  29.7 L  


 


RDW  20.9 H  


 


Plt Count  198  


 


MPV  8.7  


 


Neutrophils %  65.5  D  


 


Lymphocytes %  25.8  D  


 


Monocytes %  5.8  


 


Eosinophils %  2.1  


 


Basophils %  0.8  


 


PT with INR   11.30 


 


INR   1.03 


 


Sodium    142


 


Potassium    3.8


 


Chloride    109 H


 


Carbon Dioxide    28


 


Anion Gap    5 L


 


BUN    3 L D


 


Creatinine    0.4 L


 


Creat Clearance w eGFR    > 60


 


Random Glucose    68 L


 


Calcium    8.0 L


 


Phosphorus    3.2


 


Magnesium    2.1  D


 


Total Bilirubin    1.4 H


 


AST    217 H


 


ALT    128 H


 


Alkaline Phosphatase    186 H


 


Total Protein    5.2 L


 


Albumin    2.4 L








Active Medications











Generic Name Dose Route Start Last Admin





  Trade Name Freq  PRN Reason Stop Dose Admin


 


Chlordiazepoxide HCl  25 mg 09/19/17 20:59  





  Librium -  PO 09/22/17 20:58  





  Q4H PRN   





  WITHDRAWAL(CONT SUBST)   


 


Chlordiazepoxide HCl  25 mg 09/20/17 23:00 09/21/17 11:29





  Librium -  PO 09/21/17 17:01  25 mg





  [id number] JOSE   Administration


 


Chlordiazepoxide HCl  15 mg 09/21/17 23:00  





  Librium -  PO 09/22/17 17:01  





  [id number] JOSE   


 


Folic Acid  1 mg 09/20/17 10:00 09/21/17 09:33





  Folic Acid -  PO   1 mg





  DAILY JOSE   Administration


 


Morphine Sulfate  2 mg 09/19/17 10:24 09/21/17 06:46





  Morphine Injection -  IVPUSH   2 mg





  Q4H PRN   Administration





  PAIN   


 


Ondansetron HCl  4 mg 09/19/17 14:47  





  Zofran Odt -  SL   





  Q8H PRN   





  NAUSEA   


 


Oxycodone HCl  5 mg 09/21/17 13:28  





  Roxicodone -  PO   





  Q4H PRN   





  PAIN   


 


Thiamine HCl  100 mg 09/20/17 10:00 09/21/17 09:33





  Vitamin B1 -  PO   100 mg





  DAILY JOSE   Administration











ASSESSMENT/PLAN:





Dispo: We will continue to follow the patient. Thank you for this consultative 

opportunity.

## 2017-09-21 NOTE — CONSULT
Consult


Consult Specialty:: GI


Referred by:: Dana Cordova


Reason for Consultation:: persistent abdominal pain





- History of Present Illness


Chief Complaint: persistent abdominal pain


History of Present Illness: 


Generalized, with RUQ emphasis, abdominal, 5/10, radiating to the back pain of 

few weeks duration. Worse in the last few days. Associated with nausea, one 

episode of non-bilious vomiting. No fever, chils, jaundice, altered bowels, 

melena, hematochezia. No obvious aggravating, or alleviating factors. Reports 

lack of appetite. Denies GERD, dysphagia. On admission RUQ US noted to have 

fatty liver, mild hepatitis with cholestasis with thikened BG, low albumin, 

however no stone, CBD dilation, or pancreatic abnormality on CT. History recent 

ETOH. HCG negative.





- History Source


History Provided By: Patient, Medical Record


Limitations to Obtaining History: No Limitations





- Past Medical History


CNS: No: Migraine, Seizure, Syncope, Vertigo


Cardio/Vascular: No: CAD, CHF


Pulmonary: No: COPD, Previously Intubated


Gastrointestinal: Yes: Constipation, GERD (prio to gastric sleeve in ), 

Pancreatitis.  No: Ascites, Cancer, Diverticulitis, Diverticulosis, Esophageal 

Varices, GI Bleed, Hiatal Hernia, Inflamatory Bowel Disease, Peptic Ulcer 

Disease


Hepatobiliary: Yes: Other (unknown hepaitis status).  No: Cirrhosis, 

Cholelithiasis, Cholecystitis, Choledocholithiasis


Renal/: No: Renal Failure, Renal Inusuff, Renal Calculi


...LMP: 17


...Pregnant: No (negative HCG on this admission )


Heme/Onc: Yes: Anemia.  No: Bleeding Disorder, Thrombocytopenia


Infectious Disease: Yes: MRSA (history of)





- Past Surgical History


Past Surgical History: Yes: Appendectomy, Bariatric Surgery, 





- Alcohol/Substance Use


Hx Alcohol Use: Yes





- Smoking History


Smoking history: Current every day smoker


Have you smoked in the past 12 months: Yes


Aproximately how many cigarettes per day: 2





- Social History


ADL: Independent





Home Medications





- Allergies


Allergies/Adverse Reactions: 


 Allergies











Allergy/AdvReac Type Severity Reaction Status Date / Time


 


No Known Allergies Allergy   Verified 17 23:44














- Home Medications


Home Medications: 


Ambulatory Orders





Multivitamin [One Daily] 1 each PO DAILY 17 











Family Disease History





- Family Disease History


Family History: Unremarkable





Review of Systems


Findings/Remarks: 


Chart reviewed, ROS negative unless indicated





- Review of Systems


Gastrointestinal: reports: Other (See HPI)


Integumentary: reports: Erythema (facial), Pruritis (facial)





Physical Exam


Vital Signs: 


 Vital Signs











Temperature  98.3 F   17 08:46


 


Pulse Rate  72   17 08:46


 


Respiratory Rate  18   17 08:46


 


Blood Pressure  119/65   17 08:46


 


O2 Sat by Pulse Oximetry (%)  99   17 09:00











Constitutional: Yes: Well Nourished, Anxious, Mild Distress


Eyes: Yes: Conjunctiva Clear.  No: Sclera Icterus


HENT: Yes: Atraumatic


Neck: Yes: Supple


Cardiovascular: Yes: Regular Rate and Rhythm


Respiratory: Yes: CTA Bilaterally


Gastrointestinal: Yes: Normal Bowel Sounds, Soft, Tenderness, Tenderness, 

Epigastrium, Vomiting.  No: Abdomen, Obese, Ascites, Distention, Palpable Mass, 

Pulsatile Mass, Tenderness, Rebound


Musculoskeletal: No: Joint Stiffness, Joint Swelling


Integumentary: Yes: Erythema (facial)


Neurological: Yes: Alert, Oriented.  No: Aphasia, Asterixis, Confusion, 

Dysarthria, Lethargy


Psychiatric: Yes: Alert, Oriented.  No: Agitated


Labs: 


 


 Abnormal Lab Results











  17





  06:00 06:00


 


Hgb  7.3 L 


 


Hct  24.4 L 


 


MCV  66.8 L 


 


MCH  19.8 L 


 


MCHC  29.7 L 


 


RDW  20.9 H 


 


Chloride   109 H


 


Anion Gap   5 L


 


BUN   3 L D


 


Creatinine   0.4 L


 


Random Glucose   68 L


 


Calcium   8.0 L


 


Total Bilirubin   1.4 H


 


AST   217 H


 


ALT   128 H


 


Alkaline Phosphatase   186 H


 


Total Protein   5.2 L


 


Albumin   2.4 L








 Laboratory Tests











  17





  02:08 02:08 02:08


 


WBC  5.3  D  


 


RBC  4.07  D  


 


Hgb  8.1 L D  


 


Hct  26.9 L D  


 


MCV  66.3 L  


 


MCH  20.0 L  


 


MCHC  30.2 L  


 


RDW  20.5 H D  


 


Plt Count  251  


 


MPV  9.1  


 


Neutrophils %  65.9  


 


Lymphocytes %  27.2  


 


Monocytes %  5.1  


 


Eosinophils %  1.3  


 


Basophils %  0.5  


 


Hypochromia  2+  


 


Platelet Estimate  Adequate  


 


Anisocytosis  1+  


 


Microcytosis  1+  


 


Macrocytosis  1+  


 


PT with INR   


 


INR   


 


Sodium   144 


 


Potassium   3.2 L 


 


Chloride   108 H 


 


Carbon Dioxide   25 


 


Anion Gap   11 


 


BUN   7 


 


Creatinine   0.7  D 


 


Creat Clearance w eGFR   > 60 


 


Random Glucose   84 


 


Calcium   7.9 L 


 


Phosphorus   


 


Magnesium   


 


Total Bilirubin   0.9  D 


 


AST   284 H D 


 


ALT   179 H D 


 


Alkaline Phosphatase   231 H D 


 


Total Protein   6.9 


 


Albumin   3.4 


 


Lipase   487 H 


 


Urine Color   


 


Urine Appearance   


 


Urine pH   


 


Ur Specific Gravity   


 


Urine Protein   


 


Urine Glucose (UA)   


 


Urine Ketones   


 


Urine Blood   


 


Urine Nitrite   


 


Urine Bilirubin   


 


Urine Urobilinogen   


 


Urine HCG, Qual   


 


Opiates Screen   


 


Methadone Screen   


 


Barbiturate Screen   


 


Phencyclidine Screen   


 


Ur Amphetamines Screen   


 


MDMA (Ecstasy) Screen   


 


Benzodiazepines Screen   


 


Cocaine Screen   


 


U Marijuana (THC) Screen   


 


Alcohol, Quantitative    290.2 H*














  17





  02:10 02:10 02:10


 


WBC   


 


RBC   


 


Hgb   


 


Hct   


 


MCV   


 


MCH   


 


MCHC   


 


RDW   


 


Plt Count   


 


MPV   


 


Neutrophils %   


 


Lymphocytes %   


 


Monocytes %   


 


Eosinophils %   


 


Basophils %   


 


Hypochromia   


 


Platelet Estimate   


 


Anisocytosis   


 


Microcytosis   


 


Macrocytosis   


 


PT with INR   


 


INR   


 


Sodium   


 


Potassium   


 


Chloride   


 


Carbon Dioxide   


 


Anion Gap   


 


BUN   


 


Creatinine   


 


Creat Clearance w eGFR   


 


Random Glucose   


 


Calcium   


 


Phosphorus   


 


Magnesium   


 


Total Bilirubin   


 


AST   


 


ALT   


 


Alkaline Phosphatase   


 


Total Protein   


 


Albumin   


 


Lipase   


 


Urine Color  Ltyellow  


 


Urine Appearance  Clear  


 


Urine pH  6.0  


 


Ur Specific Gravity  1.010  


 


Urine Protein  Negative  


 


Urine Glucose (UA)  Negative  


 


Urine Ketones  Negative  


 


Urine Blood  Negative  


 


Urine Nitrite  Negative  


 


Urine Bilirubin  Negative  


 


Urine Urobilinogen  Negative  


 


Urine HCG, Qual   Negative 


 


Opiates Screen    Negative


 


Methadone Screen    Negative


 


Barbiturate Screen    Negative


 


Phencyclidine Screen    Negative


 


Ur Amphetamines Screen    Negative


 


MDMA (Ecstasy) Screen    Negative


 


Benzodiazepines Screen    Negative


 


Cocaine Screen    Negative


 


U Marijuana (THC) Screen    Negative


 


Alcohol, Quantitative   














  17





  10:10 10:10 10:10


 


WBC    4.8


 


RBC    3.91


 


Hgb    7.7 L


 


Hct    26.2 L


 


MCV    67.0 L


 


MCH    19.6 L


 


MCHC    29.3 L


 


RDW    21.2 H


 


Plt Count    221


 


MPV    8.7


 


Neutrophils %    53.1


 


Lymphocytes %    38.9  D


 


Monocytes %    5.4


 


Eosinophils %    1.8


 


Basophils %    0.8


 


Hypochromia   


 


Platelet Estimate   


 


Anisocytosis   


 


Microcytosis   


 


Macrocytosis   


 


PT with INR   


 


INR   


 


Sodium   145 


 


Potassium   3.7 


 


Chloride   113 H 


 


Carbon Dioxide   27 


 


Anion Gap   5 L 


 


BUN   5 L D 


 


Creatinine   0.5 L D 


 


Creat Clearance w eGFR   > 60 


 


Random Glucose   64 L D 


 


Calcium   7.4 L 


 


Phosphorus   


 


Magnesium  1.8  


 


Total Bilirubin   0.7  D 


 


AST   507 H D 


 


ALT   212 H 


 


Alkaline Phosphatase   198 H 


 


Total Protein   6.1 L 


 


Albumin   2.8 L 


 


Lipase   


 


Urine Color   


 


Urine Appearance   


 


Urine pH   


 


Ur Specific Gravity   


 


Urine Protein   


 


Urine Glucose (UA)   


 


Urine Ketones   


 


Urine Blood   


 


Urine Nitrite   


 


Urine Bilirubin   


 


Urine Urobilinogen   


 


Urine HCG, Qual   


 


Opiates Screen   


 


Methadone Screen   


 


Barbiturate Screen   


 


Phencyclidine Screen   


 


Ur Amphetamines Screen   


 


MDMA (Ecstasy) Screen   


 


Benzodiazepines Screen   


 


Cocaine Screen   


 


U Marijuana (THC) Screen   


 


Alcohol, Quantitative   














  17





  06:00 06:00 06:00


 


WBC  4.4  


 


RBC  3.88  


 


Hgb  7.7 L  


 


Hct  26.1 L  


 


MCV  67.2 L  


 


MCH  19.7 L  


 


MCHC  29.3 L  


 


RDW  20.7 H  


 


Plt Count  184  


 


MPV  8.8  


 


Neutrophils %   


 


Lymphocytes %   


 


Monocytes %   


 


Eosinophils %   


 


Basophils %   


 


Hypochromia   


 


Platelet Estimate   


 


Anisocytosis   


 


Microcytosis   


 


Macrocytosis   


 


PT with INR   


 


INR   


 


Sodium   141 


 


Potassium   3.4 L 


 


Chloride   110 H 


 


Carbon Dioxide   26 


 


Anion Gap   5 L 


 


BUN   5 L 


 


Creatinine   0.5 L 


 


Creat Clearance w eGFR   > 60 


 


Random Glucose   57 L 


 


Calcium   7.7 L 


 


Phosphorus   3.1 


 


Magnesium   1.5 L 


 


Total Bilirubin   1.3 H D 


 


AST   231 H D 


 


ALT   146 H D 


 


Alkaline Phosphatase   183 H 


 


Total Protein   5.6 L 


 


Albumin   2.6 L 


 


Lipase    178


 


Urine Color   


 


Urine Appearance   


 


Urine pH   


 


Ur Specific Gravity   


 


Urine Protein   


 


Urine Glucose (UA)   


 


Urine Ketones   


 


Urine Blood   


 


Urine Nitrite   


 


Urine Bilirubin   


 


Urine Urobilinogen   


 


Urine HCG, Qual   


 


Opiates Screen   


 


Methadone Screen   


 


Barbiturate Screen   


 


Phencyclidine Screen   


 


Ur Amphetamines Screen   


 


MDMA (Ecstasy) Screen   


 


Benzodiazepines Screen   


 


Cocaine Screen   


 


U Marijuana (THC) Screen   


 


Alcohol, Quantitative   














  17





  06:00 06:00 06:00


 


WBC  4.4  


 


RBC  3.66  


 


Hgb  7.3 L  


 


Hct  24.4 L  


 


MCV  66.8 L  


 


MCH  19.8 L  


 


MCHC  29.7 L  


 


RDW  20.9 H  


 


Plt Count  198  


 


MPV  8.7  


 


Neutrophils %  65.5  D  


 


Lymphocytes %  25.8  D  


 


Monocytes %  5.8  


 


Eosinophils %  2.1  


 


Basophils %  0.8  


 


Hypochromia   


 


Platelet Estimate   


 


Anisocytosis   


 


Microcytosis   


 


Macrocytosis   


 


PT with INR   11.30 


 


INR   1.03 


 


Sodium    142


 


Potassium    3.8


 


Chloride    109 H


 


Carbon Dioxide    28


 


Anion Gap    5 L


 


BUN    3 L D


 


Creatinine    0.4 L


 


Creat Clearance w eGFR    > 60


 


Random Glucose    68 L


 


Calcium    8.0 L


 


Phosphorus    3.2


 


Magnesium    2.1  D


 


Total Bilirubin    1.4 H


 


AST    217 H


 


ALT    128 H


 


Alkaline Phosphatase    186 H


 


Total Protein    5.2 L


 


Albumin    2.4 L


 


Lipase   


 


Urine Color   


 


Urine Appearance   


 


Urine pH   


 


Ur Specific Gravity   


 


Urine Protein   


 


Urine Glucose (UA)   


 


Urine Ketones   


 


Urine Blood   


 


Urine Nitrite   


 


Urine Bilirubin   


 


Urine Urobilinogen   


 


Urine HCG, Qual   


 


Opiates Screen   


 


Methadone Screen   


 


Barbiturate Screen   


 


Phencyclidine Screen   


 


Ur Amphetamines Screen   


 


MDMA (Ecstasy) Screen   


 


Benzodiazepines Screen   


 


Cocaine Screen   


 


U Marijuana (THC) Screen   


 


Alcohol, Quantitative   








 Vital Signs (72 hours)











  17





  23:41 00:12 03:10


 


Temperature 97.9 F 98.5 F 


 


Pulse Rate 92 H  


 


Pulse Rate [  90 96 H





Apical]   


 


Respiratory 18 18 15





Rate   


 


Blood Pressure 140/104  


 


Blood Pressure  140/104 145/76





[Left Arm]   


 


O2 Sat by Pulse 99  99





Oximetry (%)   














  17





  07:02 09:31 14:31


 


Temperature   


 


Pulse Rate   


 


Pulse Rate [ 98 H 54 L 58 L





Apical]   


 


Respiratory 18 18 18





Rate   


 


Blood Pressure   


 


Blood Pressure 158/84 134/73 134/89





[Left Arm]   


 


O2 Sat by Pulse 100 100 97





Oximetry (%)   














  17





  16:00 21:00 21:48


 


Temperature 98.4 F  97.4 F L


 


Pulse Rate 60  82


 


Pulse Rate [   





Apical]   


 


Respiratory 20 20 20





Rate   


 


Blood Pressure 130/74  123/79


 


Blood Pressure   





[Left Arm]   


 


O2 Sat by Pulse  97 





Oximetry (%)   














  17





  05:15 06:10 09:09


 


Temperature  97.5 F L 98.1 F


 


Pulse Rate  61 60


 


Pulse Rate [   





Apical]   


 


Respiratory 20 20 16





Rate   


 


Blood Pressure  136/83 134/78


 


Blood Pressure   





[Left Arm]   


 


O2 Sat by Pulse 97  





Oximetry (%)   














  17





  10:45 13:35 19:04


 


Temperature  98.2 F 98.3 F


 


Pulse Rate  66 110 H


 


Pulse Rate [   





Apical]   


 


Respiratory  20 20





Rate   


 


Blood Pressure  139/92 129/74


 


Blood Pressure   





[Left Arm]   


 


O2 Sat by Pulse 95  





Oximetry (%)   














  17





  21:00 23:00 06:00


 


Temperature  98.5 F 98 F


 


Pulse Rate  88 79


 


Pulse Rate [   





Apical]   


 


Respiratory 20 20 20





Rate   


 


Blood Pressure  132/70 113/73


 


Blood Pressure   





[Left Arm]   


 


O2 Sat by Pulse 96  





Oximetry (%)   














  17





  08:46 09:00


 


Temperature 98.3 F 


 


Pulse Rate 72 


 


Pulse Rate [  





Apical]  


 


Respiratory 18 





Rate  


 


Blood Pressure 119/65 


 


Blood Pressure  





[Left Arm]  


 


O2 Sat by Pulse  99





Oximetry (%)  








 Home Medications











 Medication  Instructions  Recorded


 


Multivitamin [One Daily] 1 each PO DAILY 17








 Current Medications











Generic Name Dose Route Start Last Admin





  Trade Name Freq  PRN Reason Stop Dose Admin


 


Chlordiazepoxide HCl  25 mg 17 20:59  





  Librium -  PO 17 20:58  





  Q4H PRN   





  WITHDRAWAL(CONT SUBST)   


 


Chlordiazepoxide HCl  25 mg 17 23:00 17 11:29





  Librium -  PO 17 17:01  25 mg





  [id number] JOSE   Administration


 


Chlordiazepoxide HCl  15 mg 17 23:00  





  Librium -  PO 17 17:01  





  [id number] JOSE   


 


Folic Acid  1 mg 17 10:00 17 09:33





  Folic Acid -  PO   1 mg





  DAILY JOSE   Administration


 


Morphine Sulfate  2 mg 17 10:24 17 06:46





  Morphine Injection -  IVPUSH   2 mg





  Q4H PRN   Administration





  PAIN   


 


Ondansetron HCl  4 mg 17 14:47  





  Zofran Odt -  SL   





  Q8H PRN   





  NAUSEA   


 


Oxycodone HCl  5 mg 17 13:28 17 14:54





  Roxicodone -  PO   5 mg





  Q4H PRN   Administration





  PAIN   


 


Thiamine HCl  100 mg 17 10:00 17 09:33





  Vitamin B1 -  PO   100 mg





  DAILY JOSE   Administration








 Vital Signs (72 hours)











  17





  23:41 00:12 03:10


 


Temperature 97.9 F 98.5 F 


 


Pulse Rate 92 H  


 


Pulse Rate [  90 96 H





Apical]   


 


Respiratory 18 18 15





Rate   


 


Blood Pressure 140/104  


 


Blood Pressure  140/104 145/76





[Left Arm]   


 


O2 Sat by Pulse 99  99





Oximetry (%)   














  17





  07:02 09:31 14:31


 


Temperature   


 


Pulse Rate   


 


Pulse Rate [ 98 H 54 L 58 L





Apical]   


 


Respiratory 18 18 18





Rate   


 


Blood Pressure   


 


Blood Pressure 158/84 134/73 134/89





[Left Arm]   


 


O2 Sat by Pulse 100 100 97





Oximetry (%)   














  17





  16:00 21:00 21:48


 


Temperature 98.4 F  97.4 F L


 


Pulse Rate 60  82


 


Pulse Rate [   





Apical]   


 


Respiratory 20 20 20





Rate   


 


Blood Pressure 130/74  123/79


 


Blood Pressure   





[Left Arm]   


 


O2 Sat by Pulse  97 





Oximetry (%)   














  17





  05:15 06:10 09:09


 


Temperature  97.5 F L 98.1 F


 


Pulse Rate  61 60


 


Pulse Rate [   





Apical]   


 


Respiratory 20 20 16





Rate   


 


Blood Pressure  136/83 134/78


 


Blood Pressure   





[Left Arm]   


 


O2 Sat by Pulse 97  





Oximetry (%)   














  17





  10:45 13:35 19:04


 


Temperature  98.2 F 98.3 F


 


Pulse Rate  66 110 H


 


Pulse Rate [   





Apical]   


 


Respiratory  20 20





Rate   


 


Blood Pressure  139/92 129/74


 


Blood Pressure   





[Left Arm]   


 


O2 Sat by Pulse 95  





Oximetry (%)   














  17





  21:00 23:00 06:00


 


Temperature  98.5 F 98 F


 


Pulse Rate  88 79


 


Pulse Rate [   





Apical]   


 


Respiratory 20 20 20





Rate   


 


Blood Pressure  132/70 113/73


 


Blood Pressure   





[Left Arm]   


 


O2 Sat by Pulse 96  





Oximetry (%)   














  17





  08:46 09:00


 


Temperature 98.3 F 


 


Pulse Rate 72 


 


Pulse Rate [  





Apical]  


 


Respiratory 18 





Rate  


 


Blood Pressure 119/65 


 


Blood Pressure  





[Left Arm]  


 


O2 Sat by Pulse  99





Oximetry (%)  








All Active Problems





Intoxication (Acute) 


Pancreatitis (Acute) 


Vomiting (Acute) 


Knee pain, right (Acute) 


Sunburn (Acute) 














Imaging





- Results


Cat Scan: Report Reviewed (See HPI)


Ultrasound: Report Reviewed





Problem List





- Problems


(1) Microcytic hypochromic anemia


Code(s): D50.9 - IRON DEFICIENCY ANEMIA, UNSPECIFIED





(2) Generalized abdominal pain


Code(s): R10.84 - GENERALIZED ABDOMINAL PAIN





(3) RUQ abdominal pain


Code(s): R10.11 - RIGHT UPPER QUADRANT PAIN





(4) Alcoholic liver disorder


Code(s): K70.9 - ALCOHOLIC LIVER DISEASE, UNSPECIFIED








Assessment/Plan


Abdminal pain in setting of recent ETOH use, hepatitis with mild cholestasis 

and normal lipase. Transaminases on admission consistent with ETOH


Acute pancreatitis with normal lipse and CT is possible





History of sleeve gastrectomy with post op stenosis requiring repeated 

dilation. No history of chronic NSADS use PUD. 


Microcytic, hypochromic anemia, not consistent with chromic ETOH. 


R/O upper GI blood loss i.e marginal ulcer, ETOH gastritis








Agree with current management.


Continue to monitor transaminases, ALP, bilirubun while inpatient


Clear liquid diet as tolerated


Consider a PPI (i.e. pantoprazole 40 mg PO qAM)


ETOH effects on liver, pancreas discussed with the patient





EGD tomorrow to assess for significant ETOH/other gastritis, marginal ulcer, 

persistent abdominal pain. if MRSA positive, should be the last case of the day.





Above discussed with the patient, referring doctor and covering nurse





Visit type





- Emergency Visit


Emergency Visit: No





- New Patient


This patient is new to me today: Yes


Date on this admission: 17 (consultation)





- Critical Care


Critical Care patient: No

## 2017-09-21 NOTE — PN
Physical Exam: 


SUBJECTIVE: Patient seen and examined. RUQ tenderness unchanged, morphine only 

numbs pain. No vomiting today 








OBJECTIVE:





 Vital Signs











 Period  Temp  Pulse  Resp  BP Sys/[place]  Pulse Ox


 


 Last 24 Hr  98 F-98.5 F    18-20  113-139/65-82  96-99








PE


Neuro: alert, awake, cn 2-12intact


Pulm: CTAB


CV: s1 s2 rrr no mrg


Abd: RUQ tenderness refers to R flank and groin abd soft 


Ext: no le edema noted, warm 








 Laboratory Results - last 24 hr











  17





  06:00 06:00 06:00


 


WBC  4.4  


 


RBC  3.66  


 


Hgb  7.3 L  


 


Hct  24.4 L  


 


MCV  66.8 L  


 


MCH  19.8 L  


 


MCHC  29.7 L  


 


RDW  20.9 H  


 


Plt Count  198  


 


MPV  8.7  


 


Neutrophils %  65.5  D  


 


Lymphocytes %  25.8  D  


 


Monocytes %  5.8  


 


Eosinophils %  2.1  


 


Basophils %  0.8  


 


PT with INR   11.30 


 


INR   1.03 


 


Sodium    142


 


Potassium    3.8


 


Chloride    109 H


 


Carbon Dioxide    28


 


Anion Gap    5 L


 


BUN    3 L D


 


Creatinine    0.4 L


 


Creat Clearance w eGFR    > 60


 


Random Glucose    68 L


 


Calcium    8.0 L


 


Phosphorus    3.2


 


Magnesium    2.1  D


 


Total Bilirubin    1.4 H


 


AST    217 H


 


ALT    128 H


 


Alkaline Phosphatase    186 H


 


Total Protein    5.2 L


 


Albumin    2.4 L








Active Medications











Generic Name Dose Route Start Last Admin





  Trade Name Freq  PRN Reason Stop Dose Admin


 


Chlordiazepoxide HCl  25 mg 17 20:59  





  Librium -  PO 17 20:58  





  Q4H PRN   





  WITHDRAWAL(CONT SUBST)   


 


Chlordiazepoxide HCl  25 mg 17 23:00 17 11:29





  Librium -  PO 17 17:01  25 mg





  [id number] JOSE   Administration


 


Chlordiazepoxide HCl  15 mg 17 23:00  





  Librium -  PO 17 17:01  





  [id number] JOSE   


 


Folic Acid  1 mg 17 10:00 17 09:33





  Folic Acid -  PO   1 mg





  DAILY JOSE   Administration


 


Sodium Chloride  1,000 mls @ 83 mls/hr 17 15:45  





  Normal Saline -  IV   





  ASDIR JOSE   


 


Morphine Sulfate  2 mg 17 10:24 17 06:46





  Morphine Injection -  IVPUSH   2 mg





  Q4H PRN   Administration





  PAIN   


 


Ondansetron HCl  4 mg 17 14:47  





  Zofran Odt -  SL   





  Q8H PRN   





  NAUSEA   


 


Oxycodone HCl  5 mg 17 13:28 17 14:54





  Roxicodone -  PO   5 mg





  Q4H PRN   Administration





  PAIN   


 


Pantoprazole Sodium  40 mg 17 15:45  





  Protonix -  PO   





  DAILY JOSE   


 


Thiamine HCl  100 mg 17 10:00 17 09:33





  Vitamin B1 -  PO   100 mg





  DAILY JOSE   Administration








Imaging: 


- CTAP: extensive diffuse fatty infiltration of the liver, (2) Prominent renal 

pelves and proximal ureters without obvious obstruction (3) s/p gastric sleeve 

procedure with small bowel anastomosis also noted.  Mild mesenteric edema is 

seen with clumping of small bowel loops anteriorly.  No obvious bowel 

obstruction is present.  (4) cystic changes left adnexa with free pelvic fluid.





Assessment: 37 year old female with pmhx gastric bypass (2013 @ [place]), 

appendicitis and , ETOH abuse admitted with acute abdominal pain, n.v.





Plan:





1. Elevated LFT's/RUQ pain 


- EGD tomorrow assess for other gastritis, ulcer, d/t persistent abdominal pain 

and no stones 


- Protonix 40mg daily 


- NPO after midnight 


- Appreciate GI consult 





2. ETOH abuse 


- Librium taper 


- Folic Acid daily 


- Thiamine daily 





3. ? pancreatitis 


- Improved


- Continue IVF 





4. Anemia


- Likely of chronic disease


- Hgb decrease, if continues will transfuse for hgb <7





Visit type





- Emergency Visit


Emergency Visit: Yes


ED Registration Date: 17


Care time: The patient presented to the Emergency Department on the above date 

and was hospitalized for further evaluation of their emergent condition.





- New Patient


This patient is new to me today: No





- Critical Care


Critical Care patient: No

## 2017-09-22 LAB
ALBUMIN SERPL-MCNC: 2.5 G/DL (ref 3.4–5)
ALP SERPL-CCNC: 189 U/L (ref 45–117)
ALT SERPL-CCNC: 115 U/L (ref 12–78)
ANION GAP SERPL CALC-SCNC: 7 MMOL/L (ref 8–16)
AST SERPL-CCNC: 153 U/L (ref 15–37)
BILIRUB SERPL-MCNC: 1.3 MG/DL (ref 0.2–1)
CALCIUM SERPL-MCNC: 8.1 MG/DL (ref 8.5–10.1)
CO2 SERPL-SCNC: 28 MMOL/L (ref 21–32)
CREAT SERPL-MCNC: 0.6 MG/DL (ref 0.55–1.02)
DEPRECATED RDW RBC AUTO: 20.6 % (ref 11.6–15.6)
GLUCOSE SERPL-MCNC: 68 MG/DL (ref 74–106)
MCH RBC QN AUTO: 20 PG (ref 25.7–33.7)
MCHC RBC AUTO-ENTMCNC: 29.9 G/DL (ref 32–36)
MCV RBC: 67 FL (ref 80–96)
PLATELET # BLD AUTO: 190 K/MM3 (ref 134–434)
PMV BLD: 8.7 FL (ref 7.5–11.1)
PROT SERPL-MCNC: 5.2 G/DL (ref 6.4–8.2)
WBC # BLD AUTO: 4.7 K/MM3 (ref 4–10)

## 2017-09-22 PROCEDURE — 0DB68ZX EXCISION OF STOMACH, VIA NATURAL OR ARTIFICIAL OPENING ENDOSCOPIC, DIAGNOSTIC: ICD-10-PCS | Performed by: INTERNAL MEDICINE

## 2017-09-22 RX ADMIN — MORPHINE SULFATE PRN MG: 2 INJECTION, SOLUTION INTRAMUSCULAR; INTRAVENOUS at 14:26

## 2017-09-22 RX ADMIN — MORPHINE SULFATE PRN MG: 2 INJECTION, SOLUTION INTRAMUSCULAR; INTRAVENOUS at 19:53

## 2017-09-22 RX ADMIN — PANTOPRAZOLE SODIUM SCH: 40 TABLET, DELAYED RELEASE ORAL at 09:12

## 2017-09-22 RX ADMIN — Medication SCH: at 09:13

## 2017-09-22 RX ADMIN — MORPHINE SULFATE PRN MG: 2 INJECTION, SOLUTION INTRAMUSCULAR; INTRAVENOUS at 09:32

## 2017-09-22 RX ADMIN — FOLIC ACID SCH: 1 TABLET ORAL at 09:12

## 2017-09-22 RX ADMIN — SODIUM CHLORIDE SCH MLS/HR: 9 INJECTION, SOLUTION INTRAVENOUS at 19:52

## 2017-09-22 RX ADMIN — SODIUM CHLORIDE SCH: 9 INJECTION, SOLUTION INTRAVENOUS at 17:47

## 2017-09-22 RX ADMIN — MORPHINE SULFATE PRN MG: 2 INJECTION, SOLUTION INTRAMUSCULAR; INTRAVENOUS at 03:32

## 2017-09-22 RX ADMIN — SODIUM CHLORIDE SCH MLS/HR: 9 INJECTION, SOLUTION INTRAVENOUS at 06:26

## 2017-09-22 NOTE — PN
Progress Note, Physician





- Current Medication List


Current Medications: 


Active Medications





Chlordiazepoxide HCl (Librium -)  25 mg PO Q4H PRN


   PRN Reason: WITHDRAWAL(CONT SUBST)


   Stop: 09/22/17 20:58


Chlordiazepoxide HCl (Librium -)  15 mg PO [id number] [facility]


   Stop: 09/22/17 17:01


   Last Admin: 09/22/17 06:21 Dose:  15 mg


Folic Acid (Folic Acid -)  1 mg PO DAILY [facility]


   Last Admin: 09/21/17 09:33 Dose:  1 mg


Sodium Chloride (Normal Saline -)  1,000 mls @ 83 mls/hr IV ASDIR [facility]


   Last Admin: 09/22/17 06:26 Dose:  83 mls/hr


Morphine Sulfate (Morphine Injection -)  2 mg IVPUSH Q4H PRN


   PRN Reason: PAIN


   Last Admin: 09/22/17 03:32 Dose:  2 mg


Ondansetron HCl (Zofran Odt -)  4 mg SL Q8H PRN


   PRN Reason: NAUSEA


Oxycodone HCl (Roxicodone -)  5 mg PO Q4H PRN


   PRN Reason: PAIN


   Last Admin: 09/21/17 14:54 Dose:  5 mg


Pantoprazole Sodium (Protonix -)  40 mg PO DAILY [facility]


   Last Admin: 09/21/17 17:24 Dose:  40 mg


Thiamine HCl (Vitamin B1 -)  100 mg PO DAILY [facility]


   Last Admin: 09/21/17 09:33 Dose:  100 mg











- Objective


Vital Signs: 


 Vital Signs











Temperature  98.4 F   09/22/17 05:30


 


Pulse Rate  65   09/22/17 05:30


 


Respiratory Rate  20   09/22/17 05:30


 


Blood Pressure  108/60   09/22/17 05:30


 


O2 Sat by Pulse Oximetry (%)  98   09/21/17 20:53











Constitutional: Yes: No Distress, Calm


Eyes: Yes: Conjunctiva Clear


Gastrointestinal: Yes: Normal Bowel Sounds, Soft, Tenderness.  No: Ascites, 

Distention, Tenderness, Rebound, Vomiting


Labs: 


 CBC, BMP





 Active Orders 24 hr











 Category Date Time Status


 


 NPO after midnight [DT] Diet  09/22/17 00:01 Active


 


 CBC [COMPLETE BLOOD COUNT] Routine Lab  09/22/17 06:00 Ordered


 


 COMP METABOLIC PANEL Routine Lab  09/22/17 06:00 Ordered


 


 Chlordiazepoxide [Librium -] Medication  09/21/17 23:00 Active





 15 mg PO [id number]   


 


 Oxycodone HCl [Roxicodone -] Medication  09/21/17 13:28 Active





 5 mg PO Q4H PRN   


 


 Pantoprazole Sodium [Protonix -] Medication  09/21/17 15:45 Active





 40 mg PO DAILY   


 


 Sodium Chloride [Normal Saline -] 1,000 ml Medication  09/21/17 15:45 Active





 IV ASDIR   











 INR, PTT











INR  1.03  (0.82-1.09)   09/21/17  06:00    








 Abnormal Lab Results











  09/21/17 09/21/17





  06:00 06:00


 


Hgb  7.3 L 


 


Hct  24.4 L 


 


MCV  66.8 L 


 


MCH  19.8 L 


 


MCHC  29.7 L 


 


RDW  20.9 H 


 


Chloride   109 H


 


Anion Gap   5 L


 


BUN   3 L D


 


Creatinine   0.4 L


 


Random Glucose   68 L


 


Calcium   8.0 L


 


Total Bilirubin   1.4 H


 


AST   217 H


 


ALT   128 H


 


Alkaline Phosphatase   186 H


 


Total Protein   5.2 L


 


Albumin   2.4 L














Problem List





- Problems


(1) Microcytic hypochromic anemia


Code(s): D50.9 - IRON DEFICIENCY ANEMIA, UNSPECIFIED





(2) Generalized abdominal pain


Code(s): R10.84 - GENERALIZED ABDOMINAL PAIN





(3) RUQ abdominal pain


Code(s): R10.11 - RIGHT UPPER QUADRANT PAIN





(4) Alcoholic liver disorder


Code(s): K70.9 - ALCOHOLIC LIVER DISEASE, UNSPECIFIED








Impression/Plan


Impression/Plan: 


No events overnight. Continues to have the same symptoms. Ambulatory. 





NPO for EGD today. MRSA lab pending





Visit type





- Emergency Visit


Emergency Visit: No





- New Patient


This patient is new to me today: No





- Critical Care


Critical Care patient: No

## 2017-09-22 NOTE — PN
Physical Exam: 


SUBJECTIVE: Patient seen and examined. Her pain/discomfort is unchanged, shes 

not having back pain. Instructed pt to get oob and ambulate in room 





EKG: Sinus stew 





OBJECTIVE:





 Vital Signs











 Period  Temp  Pulse  Resp  BP Sys/Landon  Pulse Ox


 


 Last 24 Hr  97.8 F-98.4 F  65-73  20-20  108-139/60-82  98








PE


Neuro: alert, awake, cn 2-12intact


Pulm: CTAB


CV: s1 s2 rrr no mrg


Abd: RUQ tenderness refers to R flank and groin 


Ext: warm, no le edema 











 Laboratory Results - last 24 hr











  17





  06:00 06:00


 


WBC  4.7 


 


RBC  3.64 


 


Hgb  7.3 L 


 


Hct  24.4 L 


 


MCV  67.0 L 


 


MCH  20.0 L 


 


MCHC  29.9 L 


 


RDW  20.6 H 


 


Plt Count  190 


 


MPV  8.7 


 


Sodium   143


 


Potassium   3.7


 


Chloride   108 H


 


Carbon Dioxide   28


 


Anion Gap   7 L


 


BUN   2 L* D


 


Creatinine   0.6  D


 


Creat Clearance w eGFR   > 60


 


Random Glucose   68 L


 


Calcium   8.1 L


 


Total Bilirubin   1.3 H


 


AST   153 H D


 


ALT   115 H


 


Alkaline Phosphatase   189 H


 


Total Protein   5.2 L


 


Albumin   2.5 L








Active Medications











Generic Name Dose Route Start Last Admin





  Trade Name Freq  PRN Reason Stop Dose Admin


 


Chlordiazepoxide HCl  25 mg 17 20:59  





  Librium -  PO 17 20:58  





  Q4H PRN   





  WITHDRAWAL(CONT SUBST)   


 


Chlordiazepoxide HCl  15 mg 17 23:00 17 06:21





  Librium -  PO 17 17:01  15 mg





  C4C-NRT JOSE   Administration


 


Folic Acid  1 mg 17 10:00 17 09:12





  Folic Acid -  PO   Not Given





  DAILY JOSE   


 


Sodium Chloride  1,000 mls @ 83 mls/hr 17 15:45 17 06:26





  Normal Saline -  IV   83 mls/hr





  ASDIR JOSE   Administration


 


Morphine Sulfate  2 mg 17 10:24 17 09:32





  Morphine Injection -  IVPUSH   2 mg





  Q4H PRN   Administration





  PAIN   


 


Ondansetron HCl  4 mg 17 14:47  





  Zofran Odt -  SL   





  Q8H PRN   





  NAUSEA   


 


Oxycodone HCl  5 mg 17 13:28 17 14:54





  Roxicodone -  PO   5 mg





  Q4H PRN   Administration





  PAIN   


 


Pantoprazole Sodium  40 mg 17 15:45 17 09:12





  Protonix -  PO   Not Given





  DAILY JOSE   


 


Thiamine HCl  100 mg 17 10:00 17 09:13





  Vitamin B1 -  PO   Not Given





  DAILY JOSE   














Assessment: 37 year old female with pmhx gastric bypass (2013 @ [place]), 

appendicitis and , ETOH abuse admitted with acute abdominal pain, n.v.





Plan:





1. Elevated LFT's/RUQ pain 


- EGD today assess for other gastritis, marginal ulcer 


- Protonix 40mg daily 


- NPO, resume clears following procedure 





2. ETOH abuse 


- Librium taper completes tomorrow 


- Folic Acid daily 


- Thiamine daily 





3. ? pancreatitis 


- Improved


- Continue IVF 





4. Anemia


- Likely of chronic disease


- Currently, asymptomatic 


- Hgb unchanged, if continues will transfuse for hgb <7








Visit type





- Emergency Visit


Emergency Visit: Yes


ED Registration Date: 17


Care time: The patient presented to the Emergency Department on the above date 

and was hospitalized for further evaluation of their emergent condition.





- New Patient


This patient is new to me today: No





- Critical Care


Critical Care patient: No

## 2017-09-23 VITALS — HEART RATE: 82 BPM | TEMPERATURE: 97.7 F | DIASTOLIC BLOOD PRESSURE: 58 MMHG | SYSTOLIC BLOOD PRESSURE: 102 MMHG

## 2017-09-23 LAB
ALBUMIN SERPL-MCNC: 2.4 G/DL (ref 3.4–5)
ALP SERPL-CCNC: 164 U/L (ref 45–117)
ALT SERPL-CCNC: 89 U/L (ref 12–78)
ANION GAP SERPL CALC-SCNC: 4 MMOL/L (ref 8–16)
AST SERPL-CCNC: 95 U/L (ref 15–37)
BILIRUB SERPL-MCNC: 1.1 MG/DL (ref 0.2–1)
CALCIUM SERPL-MCNC: 8 MG/DL (ref 8.5–10.1)
CO2 SERPL-SCNC: 30 MMOL/L (ref 21–32)
CREAT SERPL-MCNC: 0.5 MG/DL (ref 0.55–1.02)
DEPRECATED RDW RBC AUTO: 20.7 % (ref 11.6–15.6)
GLUCOSE SERPL-MCNC: 66 MG/DL (ref 74–106)
MCH RBC QN AUTO: 19.8 PG (ref 25.7–33.7)
MCHC RBC AUTO-ENTMCNC: 29.3 G/DL (ref 32–36)
MCV RBC: 67.6 FL (ref 80–96)
PLATELET # BLD AUTO: 197 K/MM3 (ref 134–434)
PMV BLD: 8.5 FL (ref 7.5–11.1)
PROT SERPL-MCNC: 5.2 G/DL (ref 6.4–8.2)
WBC # BLD AUTO: 5.9 K/MM3 (ref 4–10)

## 2017-09-23 RX ADMIN — Medication SCH MG: at 09:08

## 2017-09-23 RX ADMIN — FOLIC ACID SCH MG: 1 TABLET ORAL at 09:08

## 2017-09-23 RX ADMIN — SODIUM CHLORIDE SCH MLS/HR: 9 INJECTION, SOLUTION INTRAVENOUS at 09:07

## 2017-09-23 RX ADMIN — PANTOPRAZOLE SODIUM SCH MG: 40 TABLET, DELAYED RELEASE ORAL at 09:09

## 2017-09-23 NOTE — EKG
Test Reason : 

Blood Pressure : ***/*** mmHG

Vent. Rate : 050 BPM     Atrial Rate : 050 BPM

   P-R Int : 138 ms          QRS Dur : 082 ms

    QT Int : 480 ms       P-R-T Axes : 031 003 022 degrees

   QTc Int : 437 ms

 

SINUS BRADYCARDIA

OTHERWISE NORMAL ECG

WHEN COMPARED WITH ECG OF 21-OCT-2013 10:01,

NO SIGNIFICANT CHANGE WAS FOUND

Confirmed by MD JESÚS, PAMELLA (2013) on 9/23/2017 9:47:51 AM

 

Referred By:             Confirmed By:PAMELLA ESPINOSA MD

## 2017-09-23 NOTE — DS
Physical Exam: 


SUBJECTIVE: Patient seen and examined.  She has intermittent right sided pain 

abdominal pain.





OBJECTIVE:


Outpatient follow up for alcohol abuse, patient thinking about options.  


Encouraged her to seek abstinence of alcohol.  She is an agreement and may 

consider [facility].





 Vital Signs











 Period  Temp  Pulse  Resp  BP Sys/Landon  Pulse Ox


 


 Last 24 Hr  97.6 F-98.5 F  54-92  12-20  102-133/58-92  








PHYSICAL EXAM





GENERAL: The patient is awake, alert, and fully oriented, in no acute distress.


HEAD: Normal with no signs of trauma.


EYES: PERRL, extraocular movements intact, sclera anicteric, conjunctiva clear. 


ENT: Ears normal, nares patent, oropharynx clear without exudates, moist mucous 

membranes.


NECK: Trachea midline, full range of motion, supple. 


LUNGS: Breath sounds equal, clear to auscultation bilaterally, no wheezes, no 

crackles, no accessory muscle use. 


HEART: Regular rate and rhythm, S1, S2 without murmur, rub or gallop.


ABDOMEN: Soft, nontender, nondistended, normoactive bowel sounds, no guarding, 

no rebound, no hepatosplenomegaly, no masses.


EXTREMITIES: 2+ pulses, warm, well-perfused, no edema. 


NEUROLOGICAL: Cranial nerves II through XII grossly intact. Normal speech, gait 

not observed.


PSYCH: Normal mood, normal affect.


SKIN: Warm, dry, normal turgor, no rashes or lesions noted.





LABS


 Laboratory Results - last 24 hr











  17





  06:05 06:05


 


WBC  5.9 


 


RBC  3.66 


 


Hgb  7.3 L 


 


Hct  24.7 L 


 


MCV  67.6 L 


 


MCH  19.8 L 


 


MCHC  29.3 L 


 


RDW  20.7 H 


 


Plt Count  197 


 


MPV  8.5 


 


Sodium   142


 


Potassium   3.5


 


Chloride   108 H


 


Carbon Dioxide   30


 


Anion Gap   4 L


 


BUN   3 L D


 


Creatinine   0.5 L


 


Creat Clearance w eGFR   > 60


 


Random Glucose   66 L


 


Calcium   8.0 L


 


Total Bilirubin   1.1 H


 


AST   95 H D


 


ALT   89 H D


 


Alkaline Phosphatase   164 H


 


Total Protein   5.2 L


 


Albumin   2.4 L











HOSPITAL COURSE:





Date of Admission:17





Date of Discharge: 17





Patient is a 37 year old female with a significant past medical history of 

gastric bypass (in 2013 @ [place]), appendicitis and .  She 

presented to the ED with complaints of acute alcohol intoxication, abdominal 

pain, nausea and vomiting for a few days (unable to quantify when symptoms 

started).   In ED she c/o of diffuse abdominal pain primarily in the epigastric 

and right upper and lower quadrants.  In the ED she was noted to be intoxicated 

with an alcohol level of 290.





EGD 2017: Gastroesophageal refuls in distal esophagus and in the 

gastroesophagel junction, s/p gastric sleeve.





GI:


Abdominal Pain/Nausea/Vomiting - now resolved


A/P: Abdominal CT/Pelvis with contrast shows: (1) extensive diffuse fatty 

infiltration of the liver, (2) Prominent renal pelves andproximal ureters 

without obvious obstruction (3) s/p gastric sleeve procedure with small bowel 

anastomosis also noted.  Mild mesenteric edema is seen with clumping of small 

bowel loops anteriorly.  No obvious bowel obstruction is present.  (4) cystic 

changes left adnexa with free pelvic fluid.


Tolerating regular diet, has intermittent pain ruq pain relieved with mobility: 

also constipated, will start stool softner/laxative


EGD as noted above


Protonix 40mg daily





Elevated AST/ALT/RUQ pain - improving


A/P: Ast 505 alt @12, Lipase 487 on admission 


Abdominal CT shows extensive diffuse fatty inf. of liver


Now trending down to her baseline





Psyche:


Acute alcohol intoxication/ETOH tox 290 - now resolved


A/P: Finished Librium taper today


Patient to follow up with either AA or [facility] for continued abstinence.


I spoke to patient about the importance of abstinence, she states she has 

multiple stressors at home, but will seek outside help for alcohol abstinence.


Folic and thiamine daily





Hematology:


Acute anemia 


A/P: Likely secondary to ETOH abuse


Monitor in the abstinence of alcohol


Repeat CBC as an outpatient





Disposition:  Discharge patient home with PCP follow up for CBC/CMP repeat.  

Full code. 





Minutes to complete discharge: 60





Discharge Summary


Reason For Visit: PANCREATITIS VOMITING INTOX


Current Active Problems





Alcoholic liver disorder (Acute) 


Cholestasis (Acute) 


Generalized abdominal pain (Acute) 


Intoxication (Acute) 


Microcytic hypochromic anemia (Acute) 


Pancreatitis (Acute) 


RUQ abdominal pain (Acute) 


Transaminitis (Acute) 


Vomiting (Acute) 








Condition: Improved





- Instructions


Diet, Activity, Other Instructions: 


Mrs. Gann:





Please take your new medications as prescribed.  





You must continue to abstain for consuming alcohol.  





Please see Dr. Shaffer within 1 week after discharge.  You were seen by Dr. Ryder Haile (Gastroenterologist) who performed a biopsy.  Please see him within 2 

weeks after discharge for results of the biopsy. 





Please take over the counter colace (stool softner) or a laxative if you 

continue to experience constipation. 





Consider outpatient alcohol anonymous at [address] [place] [address] as discussed. 








[facility]


[phone]





Referrals: 


Ryder Haile MD [Staff Physician] - 2 Weeks


Anjel Shaffer MD [Primary Care Provider] - 1 Week


Francisco Meredith MD [Staff Physician] - 


Disposition: HOME





- Home Medications


Comprehensive Discharge Medication List: 


Ambulatory Orders





Multivitamin [One Daily] 1 each PO DAILY 17 


Oxycodone HCl [Roxicodone -] 5 mg PO Q4H PRN #7 tablet MDD 2 tablets 17 








This patient is new to me today: No


Emergency Visit: Yes


ED Registration Date: 17


Care time: The patient presented to the Emergency Department on the above date 

and was hospitalized for further evaluation of their emergent condition.


Critical Care patient: No





- Discharge Referral


Referred to [facility] No

## 2017-09-23 NOTE — PN
Physical Exam: 


SUBJECTIVE: Patient seen and examined








OBJECTIVE:





 Vital Signs











 Period  Temp  Pulse  Resp  BP Sys/Landon  Pulse Ox


 


 Last 24 Hr  97.6 F-98.5 F  54-92  12-20  102-133/58-92  











GENERAL: The patient is awake, alert, and fully oriented, in no acute distress.


HEAD: Normal with no signs of trauma.


EYES: PERRL, extraocular movements intact, sclera anicteric, conjunctiva clear. 

No ptosis. 


ENT: Ears normal, nares patent, oropharynx clear without exudates, moist mucous 


membranes.


NECK: Trachea midline, full range of motion, supple. 


LUNGS: Breath sounds equal, clear to auscultation bilaterally, no wheezes, no 

crackles, no 


accessory muscle use. 


HEART: Regular rate and rhythm, S1, S2 without murmur, rub or gallop.


ABDOMEN: Soft, nontender, nondistended, normoactive bowel sounds, no guarding, 

no 


rebound, no hepatosplenomegaly, no masses.


EXTREMITIES: 2+ pulses, warm, well-perfused, no edema. 


NEUROLOGICAL: Cranial nerves II through XII grossly intact. Normal speech, gait 

not 


observed.


PSYCH: Normal mood, normal affect.


SKIN: Warm, dry, normal turgor, no rashes or lesions noted














 Laboratory Results - last 24 hr











  09/23/17 09/23/17





  06:05 06:05


 


WBC  5.9 


 


RBC  3.66 


 


Hgb  7.3 L 


 


Hct  24.7 L 


 


MCV  67.6 L 


 


MCH  19.8 L 


 


MCHC  29.3 L 


 


RDW  20.7 H 


 


Plt Count  197 


 


MPV  8.5 


 


Sodium   142


 


Potassium   3.5


 


Chloride   108 H


 


Carbon Dioxide   30


 


Anion Gap   4 L


 


BUN   3 L D


 


Creatinine   0.5 L


 


Creat Clearance w eGFR   > 60


 


Random Glucose   66 L


 


Calcium   8.0 L


 


Total Bilirubin   1.1 H


 


AST   95 H D


 


ALT   89 H D


 


Alkaline Phosphatase   164 H


 


Total Protein   5.2 L


 


Albumin   2.4 L








Active Medications











Generic Name Dose Route Start Last Admin





  Trade Name Freq  PRN Reason Stop Dose Admin


 


Folic Acid  1 mg 09/20/17 10:00 09/23/17 09:08





  Folic Acid -  PO   1 mg





  DAILY JOSE   Administration


 


Sodium Chloride  1,000 mls @ 83 mls/hr 09/21/17 15:45 09/23/17 09:07





  Normal Saline -  IV   83 mls/hr





  ASDIR JOSE   Administration


 


Morphine Sulfate  2 mg 09/19/17 10:24 09/22/17 19:53





  Morphine Injection -  IVPUSH   2 mg





  Q4H PRN   Administration





  PAIN   


 


Ondansetron HCl  4 mg 09/19/17 14:47  





  Zofran Odt -  SL   





  Q8H PRN   





  NAUSEA   


 


Oxycodone HCl  5 mg 09/21/17 13:28 09/23/17 09:08





  Roxicodone -  PO   5 mg





  Q4H PRN   Administration





  PAIN   


 


Pantoprazole Sodium  40 mg 09/21/17 15:45 09/23/17 09:09





  Protonix -  PO   40 mg





  DAILY JOSE   Administration


 


Thiamine HCl  100 mg 09/20/17 10:00 09/23/17 09:08





  Vitamin B1 -  PO   100 mg





  DAILY JOSE   Administration











ASSESSMENT/PLAN:

## 2017-09-25 NOTE — PATH
Surgical Pathology Report



Patient Name:  SANCHEZ QUIROGA

Accession #:  [id number]

[facility]. Rec. #:  [id number]                                                        

   /Age/Gender:  1980 (Age: 37) / F

Account:  [id number]                                                          

             Location: [address] [place] [address]/[facility]

Taken:  2017

Received:  2017

Reported:  2017

Physicians:  Ryder Haile M.D.

  



Specimen(s) Received

 BX GASTRIC BODY 





Clinical History

Abdominal pain, iron deficiency anemia, status post gastric bypass

Abdominal pain







Final Diagnosis

STOMACH, BODY, BIOPSY:

GASTRIC FUNDIC MUCOSA WITH FOCAL LAMINA PROPRIA FIBROSIS SUGGESTIVE OF HEALED

ULCER, AND MILD CHRONIC GASTRITIS.

IMMUNOSTAIN FOR H. PYLORI IS NEGATIVE.  





***Electronically Signed***

Catrachito Ho M.D.





Gross Description

Received in formalin, labeled "biopsy gastric body" are 2 tan, irregular

portions of soft tissue measuring 0.2 and 0.4 cm. in greatest dimension. The

specimens are submitted in toto in one cassette.

/2017

## 2019-02-05 ENCOUNTER — HOSPITAL ENCOUNTER (INPATIENT)
Dept: HOSPITAL 74 - JDEL | Age: 39
LOS: 4 days | Discharge: HOME | End: 2019-02-09
Attending: OBSTETRICS & GYNECOLOGY | Admitting: OBSTETRICS & GYNECOLOGY
Payer: COMMERCIAL

## 2019-02-05 VITALS — BODY MASS INDEX: 32.2 KG/M2

## 2019-02-05 DIAGNOSIS — Z29.13: ICD-10-CM

## 2019-02-05 DIAGNOSIS — Z3A.37: ICD-10-CM

## 2019-02-05 DIAGNOSIS — R07.9: ICD-10-CM

## 2019-02-05 DIAGNOSIS — Z30.2: ICD-10-CM

## 2019-02-05 DIAGNOSIS — O26.893: ICD-10-CM

## 2019-02-05 DIAGNOSIS — N85.8: ICD-10-CM

## 2019-02-05 DIAGNOSIS — D62: ICD-10-CM

## 2019-02-05 DIAGNOSIS — D64.9: ICD-10-CM

## 2019-02-05 DIAGNOSIS — O34.211: Primary | ICD-10-CM

## 2019-02-05 LAB
ANION GAP SERPL CALC-SCNC: 5 MMOL/L (ref 8–16)
ANISOCYTOSIS BLD QL: (no result)
APTT BLD: 26.7 SECONDS (ref 25.2–36.5)
BASOPHILS # BLD: 0.3 % (ref 0–2)
BASOPHILS # BLD: 0.5 % (ref 0–2)
BUN SERPL-MCNC: 9 MG/DL (ref 7–18)
CALCIUM SERPL-MCNC: 8.1 MG/DL (ref 8.5–10.1)
CHLORIDE SERPL-SCNC: 107 MMOL/L (ref 98–107)
CO2 SERPL-SCNC: 28 MMOL/L (ref 21–32)
CREAT SERPL-MCNC: 0.6 MG/DL (ref 0.55–1.3)
DEPRECATED RDW RBC AUTO: 20.2 % (ref 11.6–15.6)
DEPRECATED RDW RBC AUTO: 20.4 % (ref 11.6–15.6)
EOSINOPHIL # BLD: 0.3 % (ref 0–4.5)
EOSINOPHIL # BLD: 0.5 % (ref 0–4.5)
GLUCOSE SERPL-MCNC: 63 MG/DL (ref 74–106)
HCT VFR BLD CALC: 22.7 % (ref 32.4–45.2)
HCT VFR BLD CALC: 26.4 % (ref 32.4–45.2)
HGB BLD-MCNC: 7 GM/DL (ref 10.7–15.3)
HGB BLD-MCNC: 8.4 GM/DL (ref 10.7–15.3)
INR BLD: 0.84 (ref 0.83–1.09)
LYMPHOCYTES # BLD: 14.4 % (ref 8–40)
LYMPHOCYTES # BLD: 15.4 % (ref 8–40)
MCH RBC QN AUTO: 21.4 PG (ref 25.7–33.7)
MCH RBC QN AUTO: 22 PG (ref 25.7–33.7)
MCHC RBC AUTO-ENTMCNC: 30.6 G/DL (ref 32–36)
MCHC RBC AUTO-ENTMCNC: 31.9 G/DL (ref 32–36)
MCV RBC: 69 FL (ref 80–96)
MCV RBC: 69.7 FL (ref 80–96)
MONOCYTES # BLD AUTO: 4.6 % (ref 3.8–10.2)
MONOCYTES # BLD AUTO: 5.2 % (ref 3.8–10.2)
NEUTROPHILS # BLD: 78.6 % (ref 42.8–82.8)
NEUTROPHILS # BLD: 80.2 % (ref 42.8–82.8)
PLATELET # BLD AUTO: 229 K/MM3 (ref 134–434)
PLATELET # BLD AUTO: 255 K/MM3 (ref 134–434)
PMV BLD: 8.1 FL (ref 7.5–11.1)
PMV BLD: 8.4 FL (ref 7.5–11.1)
POTASSIUM SERPLBLD-SCNC: 3.7 MMOL/L (ref 3.5–5.1)
PT PNL PPP: 9.9 SEC (ref 9.7–13)
RBC # BLD AUTO: 3.26 M/MM3 (ref 3.6–5.2)
RBC # BLD AUTO: 3.82 M/MM3 (ref 3.6–5.2)
SODIUM SERPL-SCNC: 139 MMOL/L (ref 136–145)
WBC # BLD AUTO: 8.6 K/MM3 (ref 4–10)
WBC # BLD AUTO: 9.2 K/MM3 (ref 4–10)

## 2019-02-05 PROCEDURE — 0UB70ZZ EXCISION OF BILATERAL FALLOPIAN TUBES, OPEN APPROACH: ICD-10-PCS | Performed by: OBSTETRICS & GYNECOLOGY

## 2019-02-05 PROCEDURE — P9038 RBC IRRADIATED: HCPCS

## 2019-02-05 PROCEDURE — P9058 RBC, L/R, CMV-NEG, IRRAD: HCPCS

## 2019-02-05 PROCEDURE — G0008 ADMIN INFLUENZA VIRUS VAC: HCPCS

## 2019-02-05 RX ADMIN — Medication SCH MLS/HR: at 14:01

## 2019-02-05 RX ADMIN — Medication SCH MLS/HR: at 21:45

## 2019-02-05 RX ADMIN — ACETAMINOPHEN PRN MG: 10 INJECTION, SOLUTION INTRAVENOUS at 20:08

## 2019-02-05 RX ADMIN — FERROUS SULFATE TAB EC 324 MG (65 MG FE EQUIVALENT) SCH: 324 (65 FE) TABLET DELAYED RESPONSE at 17:55

## 2019-02-05 NOTE — HP
Past Medical History





- Admission


Chief Complaint: Previous  in labor


History of Present Illness: 





37 yo  @ 37 weeks gestation, EDC 19 with previous , presents 

c/o contractions pain.


Upon admission she was 5cm dilated. She opts for repeat  and permanent 

sterilization.


History Source: Patient


Limitations to Obtaining History: No Limitations





- Past Medical History


Gastrointestinal: Yes: Constipation, GERD (prio to gastric sleeve in ), 

Pancreatitis.  No: Ascites, Cancer, Diverticulitis, Diverticulosis, Esophageal 

Varices, GI Bleed, Hiatal Hernia, Inflamatory Bowel Disease, Peptic Ulcer 

Disease


Hepatobiliary: Yes: Other (unknown hepaitis status).  No: Cirrhosis, 

Cholelithiasis, Cholecystitis, Choledocholithiasis


...: 8


...Para: 8


...Term: 6


...: 1


...Spon : 0


...Induced : 0


...EDC by Andrewo: 19


Heme/Onc: Yes: Anemia.  No: Bleeding Disorder, Thrombocytopenia


Infectious Disease: Yes: MRSA (history of)





- Past Surgical History


Past Surgical History: Yes: Appendectomy, Bariatric Surgery, 


Hx Myomectomy: No


Hx Transabdominal Cerclage: No





- Smoking History


Smoking history: Current every day smoker


Have you smoked in the past 12 months: Yes


Aproximately how many cigarettes per day: 2





- Alcohol/Substance Use


Hx Alcohol Use: Yes





- Social History


ADL: Independent


History of Recent Travel: No





Home Medications





- Allergies


Allergies/Adverse Reactions: 


 Allergies











Allergy/AdvReac Type Severity Reaction Status Date / Time


 


No Known Allergies Allergy   Verified 19 06:37














- Home Medications


Home Medications: 


Ambulatory Orders





Prenatal Vit 108/Iron/Folic AC [Prenatal One Tablet] 1 tab PO DAILY 19 


Ferralet 90 Tablet 1 tab PO DAILY 19 











Family Disease History





- Family Disease History


Family History: Unremarkable





Review of Systems





- Review of Systems


Constitutional: reports: No Symptoms


Eyes: reports: No Symptoms


HENT: reports: No Symptoms


Neck: reports: No Symptoms


Cardiovascular: reports: No Symptoms


Respiratory: reports: No Symptoms


Gastrointestinal: reports: No Symptoms


Genitourinary: reports: Pain


Breasts: reports: No Symptoms Reported


Musculoskeletal: reports: No Symptoms


Integumentary: reports: No Symptoms


Neurological: reports: No Symptoms


Endocrine: reports: No Symptoms


Hematology/Lymphatic: reports: No Symptoms


Psychiatric: reports: No Symptoms


Pain Intensity: 6





Physical Exam - Maternity


Vital Signs: 


 Vital Signs











Temperature  98.0 F   19 06:47


 


Pulse Rate  61   19 06:47


 


Respiratory Rate  20   19 06:47


 


Blood Pressure  107/66   19 06:47


 


O2 Sat by Pulse Oximetry (%)      











Constitutional: Yes: Well Nourished


Eyes: Yes: Conjunctiva Clear


HENT: Yes: Atraumatic


Neck: Yes: Supple


Cardiovascular: Yes: Regular Rate and Rhythm


Lungs: Clear to auscultation


Breast(s): Yes: WNL





- Abdominal Exam/OB


Number of Fetuses: Single


Fetal Presentation: Vertex


Contractions: Yes


Regularity: Irregular





- Vaginal Exam/OB


Dilatation (cm): 5


Effacement (%): 80


Fetal Station: -1





- Physical Exam


...Motor Strength: WNL


Psychiatric: Yes: Alert, Oriented





Problem List





- Problems


(1) Previous  section complicating pregnancy, antepartum condition or 

complication


Code(s): O34.219 - MATERNAL CARE FOR UNSP TYPE SCAR FROM PREVIOUS  DEL 

  





(2) Multiparity


Code(s): Z64.1 - PROBLEMS RELATED TO MULTIPARITY   





Assessment/Plan





Previous  in labor


Grand multiparity


Pre op for repeat  and BTL


Consent signed


Anesthesia to see patient

## 2019-02-05 NOTE — OP
Operative Note





- Note:


Operative Date: 19


Pre-Operative Diagnosis: Previous  in labor/Multiparity


Operation: Repeat Low Transverse 


Findings: 





SGA baby in vertex position


Surgeon: Melissa Bond


Assistant: Sherry Ventura


Anesthesia: Spinal


Specimens Removed: Placenta / Portion of tubes


Estimated Blood Loss (mls): 700

## 2019-02-05 NOTE — SURG
Surgery First Assist Note


First Assist: Sherry Ventura PA-C


Date of Service: 19


Diagnosis: 





Previous  in labor/Multiparity





Procedure: 








 Repeat Low Transverse 





I was present for the entirety of the operative procedure. For further detail, 

please refer to operative report.








Visit type





- Case Type


Case Type: Scheduled





- Emergency


Emergency Visit: No





- New patient


This patient is new to me today: Yes


Date on this admission: 19

## 2019-02-06 LAB
BASOPHILS # BLD: 0.2 % (ref 0–2)
DEPRECATED RDW RBC AUTO: 20.2 % (ref 11.6–15.6)
DEPRECATED RDW RBC AUTO: 24.8 % (ref 11.6–15.6)
DEPRECATED RDW RBC AUTO: 25.4 % (ref 11.6–15.6)
EOSINOPHIL # BLD: 0.5 % (ref 0–4.5)
HCT VFR BLD CALC: 16.7 % (ref 32.4–45.2)
HCT VFR BLD CALC: 23.9 % (ref 32.4–45.2)
HCT VFR BLD CALC: 24.9 % (ref 32.4–45.2)
HGB BLD-MCNC: 5.4 GM/DL (ref 10.7–15.3)
HGB BLD-MCNC: 7.8 GM/DL (ref 10.7–15.3)
HGB BLD-MCNC: 8.2 GM/DL (ref 10.7–15.3)
LYMPHOCYTES # BLD: 9.3 % (ref 8–40)
MCH RBC QN AUTO: 22.3 PG (ref 25.7–33.7)
MCH RBC QN AUTO: 24.6 PG (ref 25.7–33.7)
MCH RBC QN AUTO: 24.7 PG (ref 25.7–33.7)
MCHC RBC AUTO-ENTMCNC: 32 G/DL (ref 32–36)
MCHC RBC AUTO-ENTMCNC: 32.7 G/DL (ref 32–36)
MCHC RBC AUTO-ENTMCNC: 33.2 G/DL (ref 32–36)
MCV RBC: 69.6 FL (ref 80–96)
MCV RBC: 74.3 FL (ref 80–96)
MCV RBC: 75.5 FL (ref 80–96)
MONOCYTES # BLD AUTO: 5.8 % (ref 3.8–10.2)
NEUTROPHILS # BLD: 84.2 % (ref 42.8–82.8)
PLATELET # BLD AUTO: 206 K/MM3 (ref 134–434)
PLATELET # BLD AUTO: 230 K/MM3 (ref 134–434)
PLATELET # BLD AUTO: 275 K/MM3 (ref 134–434)
PMV BLD: 7.9 FL (ref 7.5–11.1)
PMV BLD: 8 FL (ref 7.5–11.1)
PMV BLD: 8.1 FL (ref 7.5–11.1)
RBC # BLD AUTO: 2.4 M/MM3 (ref 3.6–5.2)
RBC # BLD AUTO: 3.16 M/MM3 (ref 3.6–5.2)
RBC # BLD AUTO: 3.35 M/MM3 (ref 3.6–5.2)
WBC # BLD AUTO: 10.2 K/MM3 (ref 4–10)
WBC # BLD AUTO: 11.2 K/MM3 (ref 4–10)
WBC # BLD AUTO: 14.9 K/MM3 (ref 4–10)

## 2019-02-06 PROCEDURE — 3E0334Z INTRODUCTION OF SERUM, TOXOID AND VACCINE INTO PERIPHERAL VEIN, PERCUTANEOUS APPROACH: ICD-10-PCS | Performed by: OBSTETRICS & GYNECOLOGY

## 2019-02-06 PROCEDURE — 30233N1 TRANSFUSION OF NONAUTOLOGOUS RED BLOOD CELLS INTO PERIPHERAL VEIN, PERCUTANEOUS APPROACH: ICD-10-PCS | Performed by: OBSTETRICS & GYNECOLOGY

## 2019-02-06 RX ADMIN — ACETAMINOPHEN PRN MG: 325 TABLET ORAL at 22:18

## 2019-02-06 RX ADMIN — ACETAMINOPHEN PRN MG: 325 TABLET ORAL at 13:30

## 2019-02-06 RX ADMIN — FERROUS SULFATE TAB EC 324 MG (65 MG FE EQUIVALENT) SCH: 324 (65 FE) TABLET DELAYED RESPONSE at 07:14

## 2019-02-06 RX ADMIN — SIMETHICONE CHEW TAB 80 MG PRN MG: 80 TABLET ORAL at 22:19

## 2019-02-06 RX ADMIN — SIMETHICONE CHEW TAB 80 MG PRN MG: 80 TABLET ORAL at 17:15

## 2019-02-06 RX ADMIN — SIMETHICONE CHEW TAB 80 MG PRN MG: 80 TABLET ORAL at 13:28

## 2019-02-06 RX ADMIN — Medication SCH: at 23:36

## 2019-02-06 RX ADMIN — ACETAMINOPHEN PRN MG: 325 TABLET ORAL at 17:15

## 2019-02-06 RX ADMIN — ACETAMINOPHEN PRN MG: 10 INJECTION, SOLUTION INTRAVENOUS at 06:00

## 2019-02-06 RX ADMIN — Medication SCH: at 09:49

## 2019-02-06 RX ADMIN — FERROUS SULFATE TAB EC 324 MG (65 MG FE EQUIVALENT) SCH: 324 (65 FE) TABLET DELAYED RESPONSE at 17:25

## 2019-02-06 NOTE — PN
Post Partum Note





- Post Partum


Date of Delivery: 19


Post Partum Day: 1


Vital Signs: 


 Vital Signs - 24 hr











  19





  06:47 09:40 12:45


 


Temperature 98.0 F 98.4 F 97.4 F L


 


Pulse Rate  57 L 72


 


Pulse Rate [ 61  





Pulse Oximetry]   


 


Respiratory 20 18 16





Rate   


 


Blood Pressure  110/57 L 99/46 L


 


Blood Pressure 107/66  





[Left Upper Arm   





]   


 


O2 Sat by Pulse   100





Oximetry (%)   














  19





  13:00 13:15 13:30


 


Temperature   


 


Pulse Rate 67 57 L 59 L


 


Pulse Rate [   





Pulse Oximetry]   


 


Respiratory 18 18 18





Rate   


 


Blood Pressure 96/55 L 101/51 L 98/59 L


 


Blood Pressure   





[Left Upper Arm   





]   


 


O2 Sat by Pulse 100 100 100





Oximetry (%)   














  19





  15:20 16:00 17:55


 


Temperature 97.4 F L  97.4 F L


 


Pulse Rate 53 L  73


 


Pulse Rate [   





Pulse Oximetry]   


 


Respiratory 18 18 20





Rate   


 


Blood Pressure 108/65  106/65


 


Blood Pressure   





[Left Upper Arm   





]   


 


O2 Sat by Pulse   





Oximetry (%)   














  19





  20:00 21:00


 


Temperature  97.3 F L


 


Pulse Rate  79


 


Pulse Rate [  





Pulse Oximetry]  


 


Respiratory 18 18





Rate  


 


Blood Pressure  107/50 L


 


Blood Pressure  





[Left Upper Arm  





]  


 


O2 Sat by Pulse  





Oximetry (%)  











Labs: 


 Laboratory Results - last 24 hr











  19





  10:08 10:08 10:08


 


WBC  8.6  


 


RBC  3.82  


 


Hgb  8.4 L  


 


Hct  26.4 L  


 


MCV  69.0 L  


 


MCH  22.0 L D  


 


MCHC  31.9 L  


 


RDW  20.2 H  


 


Plt Count  255  D  


 


MPV  8.4  


 


Absolute Neuts (auto)  6.8  


 


Neutrophils %  78.6  


 


Lymphocytes %  15.4  D  


 


Monocytes %  5.2  


 


Eosinophils %  0.3  D  


 


Basophils %  0.5  


 


Nucleated RBC %  0  


 


Hypochromia  1+  


 


Polychromasia  1+  


 


Anisocytosis  1+  


 


Microcytosis  1+  


 


PT with INR   9.90 


 


INR   0.84 


 


PTT (Actin FS)   26.7 


 


Sodium    139


 


Potassium    3.7


 


Chloride    107


 


Carbon Dioxide    28


 


Anion Gap    5 L


 


BUN    9


 


Creatinine    0.6


 


Creat Clearance w eGFR    > 60


 


Random Glucose    63 L


 


Calcium    8.1 L


 


RPR Titer   


 


Blood Type   


 


Antibody Screen   


 


Antibody Identification   


 


Antigen Identification   


 


Crossmatch   














  19





  10:08 10:08 16:30


 


WBC    9.2


 


RBC    3.26 L


 


Hgb    7.0 L


 


Hct    22.7 L


 


MCV    69.7 L


 


MCH    21.4 L


 


MCHC    30.6 L


 


RDW    20.4 H


 


Plt Count    229


 


MPV    8.1


 


Absolute Neuts (auto)    7.4


 


Neutrophils %    80.2


 


Lymphocytes %    14.4


 


Monocytes %    4.6


 


Eosinophils %    0.5


 


Basophils %    0.3


 


Nucleated RBC %    0


 


Hypochromia   


 


Polychromasia   


 


Anisocytosis   


 


Microcytosis   


 


PT with INR   


 


INR   


 


PTT (Actin FS)   


 


Sodium   


 


Potassium   


 


Chloride   


 


Carbon Dioxide   


 


Anion Gap   


 


BUN   


 


Creatinine   


 


Creat Clearance w eGFR   


 


Random Glucose   


 


Calcium   


 


RPR Titer  Nonreactive  


 


Blood Type   O NEGATIVE 


 


Antibody Screen   Positive 


 


Antibody Identification   Adig 


 


Antigen Identification   No Result Required. 


 


Crossmatch   See Detail 














- Subjective


Subjective: No Complaints





- Objective


Afebrile: Yes


Breast: Not engorged


Abdomen: Soft, Non-tender, Other (Incision no drainage)


Uterus: Fundus firm


Vagina: Scant lochia


Extremities: Non-tender





- Assessment/Plan


(1) Status post repeat low transverse  section


Assessment: Other (POD 1  Repeat CS)   Plan: Routine Care

## 2019-02-06 NOTE — PN
Progress Note (short form)





- Note


Progress Note: 





Pt day 1 s/p c/section with spinal and duramorph.  Pt currently being 

transfused PRBC for pre-existing anemia.  No HA, back pain, incisional pain 

well tolerated.  No anesthetic issues/complications noted.

## 2019-02-06 NOTE — PN
Teaching Attending Note


Name of Resident: Rosalia Duarte





ATTENDING PHYSICIAN STATEMENT





I saw and evaluated the patient.


I reviewed the resident's note and discussed the case with the resident.


I agree with the resident's findings and plan as documented.








SUBJECTIVE:consult called for CP


39yo F with PMH ETOH abuse presented to the hospital yesterday in active labor 

and  was performed. pt developed CP at 1000. states its was across the 

entire chest, exacerbated with inspiration. does not radiate, has some 

dizzyness on ambulation. some relief with oxycodone. symptoms started prior to 

blood transfusion being initiated and has persisted all day. has not worsened. 

only scant vaginal bleeding. last drink was when she found out she was pregnant 

8 months ago. denies smoking. no significant cardiac disease in the family. 

denies SOB, fever, chills, N/V/C?D, blurred vision, numbness/tingling. 


has had multiple transfusions in the past. unknown if iron studies or reason 

for anemia was worked up. 








OBJECTIVE:


 Last Vital Signs











Temp Pulse Resp BP Pulse Ox


 


 98.1 F   81   20   141/56 L  100 


 


 19 13:32  19 13:32  19 13:32  19 13:32  19 13:30








General NAD


HEENT pale conjuctiva, dry oral mucosa


CV S1 s2 RRR no murmur/rub/gallop +chest wall tenderness


Lungs CTA B/L no wheezing/rales/rhonchi


abdomen soft. slight tenderness at surgical incision. low tranverse incision 

with steri-strips. no active bleeding. good approximation. 





ASSESSMENT AND PLAN:


39yo F with PMH ETOH abuse admitted after repeat  with medical consult 

placed for CP


1. CP- likely symptomatic anemia vs musculoskeltal although can not r/o ACS. 

low suspicion for TRALI as not hypoxic (98% on RA) and symptoms started prior 

to transfusion. also would need to consider PE given hypercoag state and likely 

reduce mobility over the past few months however there is no hemoptysis or 

tachycardia. will check stat EKG, CBC and troponins Q6H. will give oxycodone 

for pain. avoid NSAID use with anemia. should have anemia workup done as 

outpatient


patient care to be resumed by primary, Dr Lemons, in the morning

## 2019-02-06 NOTE — CONSULT
Consultation: 


REQUESTING PROVIDER: Dr. Bond





CONSULT REQUEST: We have been asked to medically evaluate this patient for (

chest pain).





HISTORY OF PRESENT ILLNESS:





Patient is a 38 year old female,  @ 37 weeks gestation came in with 

contractions. Her BRENDA was 19. Patient underwent  yesterday (19

). 


Labs today was significant for symptomatic anemia H/H 5.4/16.7 from  

yesterday. She was given 2 units of PRBC. Since patient was complaining of 

chest pain since morning hospitalist consult was requested for evaluation of 

chest pain.


As per the patient, she started having chest pain since 10 am this morning, 

located centrally then diffusely, non radiating, sharp pain, 7/10 in intensity, 

intermittent, aggravated on deep inspiration and pain on palpation. CP started 

prior to blood transfusion Denies shortness of breath, palpitations, cough. 

Also reports to have headache since this morning with dizziness but no tingling

, numbness, any focal neurological deficits. No abdominal pain,nausea or 

vomiting. 


Minimal per vaginal bleeding. Can walk to the bathroom without shortness of 

breath. 





Past Medical History: Hypertension during pregnancy (pt reports she wasn't 

given any medication, no hx of preeclampsia or eclampsia), anemia requiring 

Blood transfusion in the past. 


Allergies: NKDA


Surgical Hx: 1 c-sec 7 yrs ago, Appendectomy 3 yrs ago, Gastric bypass surgery 

in 9 yrs ago


Medications: None


Social history: Lives at home with kids. Has 9 children, the smallest one is 7 

yrs old. 


Smoking: Denies. Passive smoking


Alcohol: Last drink 9 months ago. Pt states she drinks alcohol occasionally.


Drugs: Denies


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


Travel: Denies








REVIEW OF SYSTEMS:


CONSTITUTIONAL: 


Absent:  fever, chills, diaphoresis, generalized weakness, malaise, loss of 

appetite, weight change


HEENT: 


Absent:  rhinorrhea, nasal congestion, throat pain, throat swelling, difficulty 

swallowing, mouth swelling, ear pain, eye pain, visual changes


CARDIOVASCULAR: 


Present: Chest pain


Absent: syncope, palpitations, irregular heart rate, lightheadedness, 

peripheral edema


RESPIRATORY: 


Absent: cough, shortness of breath, dyspnea with exertion, orthopnea, wheezing, 

stridor, hemoptysis


GASTROINTESTINAL:


Absent: abdominal pain, abdominal distension, nausea, vomiting, diarrhea, 

constipation, melena, hematochezia


GENITOURINARY: 


Absent: dysuria, frequency, urgency, hesitancy, hematuria, flank pain, genital 

pain


MUSCULOSKELETAL: 


Absent: myalgia, arthralgia, joint swelling, back pain, neck pain


SKIN: 


Absent: rash, itching, pallor


HEMATOLOGIC/IMMUNOLOGIC: 


Absent: easy bleeding, easy bruising, lymphadenopathy, frequent infections


ENDOCRINE:


Absent: unexplained weight gain, unexplained weight loss, heat intolerance, 

cold intolerance


NEUROLOGIC: 


Absent: headache, focal weakness or paresthesias, dizziness, unsteady gait, 

seizure, mental status changes, bladder or bowel incontinence


PSYCHIATRIC: 


Absent: anxiety, depression, suicidal or homicidal ideation, hallucinations.





PHYSICAL EXAMINATION





 Vital Signs - 24 hr











  19





  17:55 20:00 21:00


 


Temperature 97.4 F L  97.3 F L


 


Pulse Rate 73  79


 


Respiratory 20 18 18





Rate   


 


Blood Pressure 106/65  107/50 L














  19





  00:00 02:00 04:00


 


Temperature  97.9 F 


 


Pulse Rate  80 


 


Respiratory 18 18 18





Rate   


 


Blood Pressure  106/60 














  19





  06:00 08:00 08:33


 


Temperature 98.0 F  


 


Pulse Rate 83  


 


Respiratory 18 20 20





Rate   


 


Blood Pressure 106/62  














  19





  08:36 09:57 12:00


 


Temperature 97.9 F  


 


Pulse Rate 74  


 


Respiratory 20 20 20





Rate   


 


Blood Pressure 110/59 L  














  19





  13:32


 


Temperature 98.1 F


 


Pulse Rate 81


 


Respiratory 20





Rate 


 


Blood Pressure 141/56 L














GENERAL: Young female, lying in bed comfortably, Awake, alert, and fully 

oriented, in no acute distress.


EYES: EOM intact, pallor ++, no icterus. 


EARS, NOSE, THROAT: Dry mucous membranes.


NECK: Supple. No JVD. 


LUNGS: B/L clear lungs, no added sounds. 


HEART: Regular rate and rhythm, normal S1 and S2 without murmur.


CHEST: Pain on palpation


ABDOMEN: Surgical site-no active bleeding, area looks clean and dry, Soft, 

tenderness around the surgical site, BS +.


MUSCULOSKELETAL: Normal range of motion at all joints. No bony deformities or 

tenderness. No CVA tenderness.


LOWER EXTREMITIES: No peripheral edema. 


NEUROLOGICAL: No facial droop.Normal speech. Gait not observed.


PSYCHIATRIC: Cooperative. Good eye contact. Appropriate mood and affect.


SKIN: Warm, dry, normal turgor, no rashes or lesions noted. 











 Laboratory Results - last 24 hr











  19





  10:08 16:30 16:30


 


WBC   9.2 


 


RBC   3.26 L 


 


Hgb   7.0 L 


 


Hct   22.7 L 


 


MCV   69.7 L 


 


MCH   21.4 L 


 


MCHC   30.6 L 


 


RDW   20.4 H 


 


Plt Count   229 


 


MPV   8.1 


 


Absolute Neuts (auto)   7.4 


 


Neutrophils %   80.2 


 


Lymphocytes %   14.4 


 


Monocytes %   4.6 


 


Eosinophils %   0.5 


 


Basophils %   0.3 


 


Nucleated RBC %   0 


 


Blood Type  O NEGATIVE  


 


Antibody Screen  Positive  


 


Antibody Identification  Adig  


 


Fetal Screen    Negative


 


Baby's Blood Type    O positive


 


Crossmatch  See Detail   See Detail


 


Unit Expiration Date    [id number]














  19





  06:30


 


WBC  10.2 H


 


RBC  2.40 L


 


Hgb  5.4 L*


 


Hct  16.7 L D


 


MCV  69.6 L


 


MCH  22.3 L


 


MCHC  32.0


 


RDW  20.2 H


 


Plt Count  206


 


MPV  8.0


 


Absolute Neuts (auto)  8.6 H


 


Neutrophils %  84.2 H


 


Lymphocytes %  9.3  D


 


Monocytes %  5.8


 


Eosinophils %  0.5


 


Basophils %  0.2


 


Nucleated RBC %  0


 


Blood Type 


 


Antibody Screen 


 


Antibody Identification 


 


Fetal Screen 


 


Baby's Blood Type 


 


Crossmatch 


 


Unit Expiration Date 








Active Medications











Generic Name Dose Route Start Last Admin





  Trade Name Freq  PRN Reason Stop Dose Admin


 


Acetaminophen  650 mg  19 10:26  19 13:30





  Tylenol -  PO   650 mg





  Q4H PRN   Administration





  PAIN LEVEL 1-3   





     





     





     


 


Acetaminophen  1,000 mg  19 16:10  19 06:00





  Ofirmev Injection -  IVPB   1,000 mg





  Q6H PRN   Administration





  PAIN LEVEL 1 - 3   





     





     





     


 


Bisacodyl  10 mg  19 12:14  





  Dulcolax Suppository -  RC   





  PRN PRN   





  CONSTIPATION   





     





     





     


 


Diphenhydramine HCl  25 mg  19 10:26  





  Benadryl Injection -  IVPUSH   





  Q4H PRN   





  Pruritis   





     





     





     


 


Diphtheria/Tetanus/Acell Pertussis  0.5 ml  19 10:00  





  Boostrix -  IM  19 10:01  





  .ONCE ONE   





     





     





     





     


 


Ferrous Sulfate  325 mg  19 17:30  19 07:14





  Feosol -  PO   Not Given





  BIDWM JOSE   





     





     





     





     


 


Oxytocin/Sodium Chloride  20 unit in 1,000 mls @ 125 mls/hr  19 12:15   21:45





  Normal Saline+20 Units Oxytocin -  IV   125 mls/hr





  ASDIR JOSE   Administration





     





     





     





     


 


Influenza Virus Vaccine Quadrival  60 mcg  19 10:00  





  Fluarix Quad [id number] Syringe  IM  19 10:01  





  .ONCE ONE   





     





     





     





     


 


Methylergonovine Maleate  0.2 mg  19 12:14  





  Methergine Injection -  IM   





  Q4H PRN   





  Excessive Bleeding (L&D)   





     





     





     


 


Ondansetron HCl  4 mg  19 10:26  





  Zofran Injection  IVPUSH   





  Q4H PRN   





  NAUSEA   





     





     





     


 


Oxycodone HCl  5 mg  19 12:14  19 13:29





  Roxicodone -  PO   5 mg





  Q4H PRN   Administration





  PAIN LEVEL 4 - 6   





     





     





     


 


Prenatal Multivit/Folic Acid/Iron  1 tab  19 10:00  19 09:49





  Prenatal Vitamins ([facility]) -  PO   Not Given





  [facility]   





     





     





     





     


 


Simethicone  80 mg  19 12:14  19 13:28





  Mylicon -  PO   80 mg





  Q4H PRN   Administration





  GAS   





     





     





     











ASSESSMENT/PLAN:





Patient is a 38 year old female,  @ 37 weeks gestation, BRENDA was 19, 

came in with contractions underwent  19 now complaining of chest 

pain





# Chest pain-likely musculoskeletal 


   c/o sharp chest pain, 7/10 in intensity, non radiating, aggravated on deep 

inspiration and pain upon palpation.


   Stat EKG was done: NSR, HR 81 bpm, Qtc 462


   Continue tylenol, stat 10mg of oxycodone given. Avoid NSAID in the setting 

of anemia. D/C Motrin


   Stat Troponin ordered to r/o ACS. Repeat troponin @ 11:00 pm today. Low 

suspicion for ACS. 


   Consider CXR if she continues to have chest pain due to concern for TRALI. 

Low suspicion for TRALI (sat 99 % @ RA)


   


# Symptomatic microcytic anemia


   H/H 5.4/16.7 from  (19)


   Received 2 units of PRBC today. Stat CBC ordered. If Hb is < 7gm/dl will 

order another unit of PRBC


   If she bleeds heavily per vagina, will order stat unit of PRBC. 


   Needs Iron studies as outpatient (it will be not accurate if ordered now as 

she already received blood transfusion)


   Will recommend taking Iron supplements with bowel regimen





#  s/p c section-POD 1


    mls. 


   Care as per Ob


   Pain control





# Headache


   No neurological symptoms


   Continue tylenol prn. 


   


# FEN


   IV NS + 20 U of oxytocin, as per OB


   Electrolytes WNL


   NPO, advance diet as per OB





# Prophylaxis


   For DVT: Early ambulation


   For GI: not indicated





# Code Status: Full Code





# Dispo: Continue to monitor in [facility]. 





Illness, Investigation and plan of care explained to the patient. She 

verbalized understanding.





Case seen and discussed with Dr. Paula.


   


Dispo: We will continue to follow the patient. Thank you for this consultative 

opportunity.











Visit type





- Emergency Visit


Emergency Visit: Yes


ED Registration Date: 19


Care time: The patient presented to the Emergency Department on the above date 

and was hospitalized for further evaluation of their emergent condition.





- New Patient


This patient is new to me today: Yes


Date on this admission: 19





- Critical Care


Critical Care patient: No

## 2019-02-07 RX ADMIN — Medication SCH TAB: at 10:25

## 2019-02-07 RX ADMIN — ACETAMINOPHEN PRN MG: 325 TABLET ORAL at 20:02

## 2019-02-07 RX ADMIN — FERROUS SULFATE TAB EC 324 MG (65 MG FE EQUIVALENT) SCH MG: 324 (65 FE) TABLET DELAYED RESPONSE at 18:25

## 2019-02-07 RX ADMIN — SIMETHICONE CHEW TAB 80 MG PRN MG: 80 TABLET ORAL at 14:52

## 2019-02-07 RX ADMIN — FERROUS SULFATE TAB EC 324 MG (65 MG FE EQUIVALENT) SCH MG: 324 (65 FE) TABLET DELAYED RESPONSE at 08:20

## 2019-02-07 RX ADMIN — ACETAMINOPHEN PRN MG: 325 TABLET ORAL at 03:02

## 2019-02-07 RX ADMIN — SIMETHICONE CHEW TAB 80 MG PRN MG: 80 TABLET ORAL at 20:02

## 2019-02-07 RX ADMIN — ACETAMINOPHEN PRN MG: 325 TABLET ORAL at 10:27

## 2019-02-07 RX ADMIN — SIMETHICONE CHEW TAB 80 MG PRN MG: 80 TABLET ORAL at 03:03

## 2019-02-07 RX ADMIN — ACETAMINOPHEN PRN MG: 325 TABLET ORAL at 14:50

## 2019-02-07 RX ADMIN — SIMETHICONE CHEW TAB 80 MG PRN MG: 80 TABLET ORAL at 10:29

## 2019-02-07 NOTE — PN
Post Partum Progress Note





- Subjective


Subjective: 





Pt see/evaluated, feeling abdominal cramping strong like "contractions."  still 

have right sided shoulder pain. no nausea/vomiting/fever


Type of Delivery: Repeat C/S


Vital Signs: 


 Vital Signs











Temperature  98.5 F   19 22:00


 


Pulse Rate  84   19 22:00


 


Respiratory Rate  18   19 22:00


 


Blood Pressure  120/58 L  19 22:00


 


O2 Sat by Pulse Oximetry (%)  100   19 13:30











Breast Exam: Yes: Soft


Uterus: Yes: Fundus Firm, Other (diffused abdominal tenderness upon deep 

palpation)


Incision: Yes: Sutures intact


Abdomen/GI: Yes: Abdomen soft, Tender, Passing flatus


Lochia, amount: Small


Extremities: Yes: Calves non-tender


Perineum: Yes: Intact


Activity: Ambulating





- Labs


Labs: 


 CBC











WBC  14.9 K/mm3 (4.0-10.0)  H  19  23:15    


 


RBC  3.35 M/mm3 (3.60-5.2)  L  19  23:15    


 


Hgb  8.2 GM/dL (10.7-15.3)  L  19  23:15    


 


Hct  24.9 % (32.4-45.2)  L  19  23:15    


 


MCV  74.3 fl (80-96)  L  19  23:15    


 


MCH  24.6 pg (25.7-33.7)  L  19  23:15    


 


MCHC  33.2 g/dl (32.0-36.0)   19  23:15    


 


RDW  24.8 % (11.6-15.6)  H  19  23:15    


 


Plt Count  275 K/MM3 (134-434)   19  23:15    


 


MPV  7.9 fl (7.5-11.1)   19  23:15    


 


Absolute Neuts (auto)  8.6 K/mm3 (1.5-8.0)  H  19  06:30    


 


Neutrophils %  84.2 % (42.8-82.8)  H  19  06:30    


 


Lymphocytes %  9.3 % (8-40)  D 19  06:30    


 


Monocytes %  5.8 % (3.8-10.2)   19  06:30    


 


Eosinophils %  0.5 % (0-4.5)   19  06:30    


 


Basophils %  0.2 % (0-2.0)   19  06:30    


 


Nucleated RBC %  0 % (0-0)   19  06:30    


 


Hypochromia  1+   19  10:08    


 


Polychromasia  1+   19  10:08    


 


Anisocytosis  1+   19  10:08    


 


Microcytosis  1+   19  10:08    














Problem List





- Problems


(1)  delivery delivered


Code(s): O82 - ENCOUNTER FOR  DELIVERY WITHOUT INDICATION   





Assessment/Plan





37 y/o POD#2 s/p  delivery


c/o right sided chest/shoulder pain, stable, seen by medicine, workup so far 

negative


abdominal cramping, no specific fundal tenderness and no fever but will watch 

out for signs of endometritis - IV caldolor ordered


encourage ambulation


regular diet

## 2019-02-08 LAB
BASOPHILS # BLD: 0.4 % (ref 0–2)
DEPRECATED RDW RBC AUTO: 25.7 % (ref 11.6–15.6)
EOSINOPHIL # BLD: 1.6 % (ref 0–4.5)
HCT VFR BLD CALC: 22.9 % (ref 32.4–45.2)
HGB BLD-MCNC: 7.7 GM/DL (ref 10.7–15.3)
LYMPHOCYTES # BLD: 14.6 % (ref 8–40)
MCH RBC QN AUTO: 25.9 PG (ref 25.7–33.7)
MCHC RBC AUTO-ENTMCNC: 33.9 G/DL (ref 32–36)
MCV RBC: 76.5 FL (ref 80–96)
MONOCYTES # BLD AUTO: 7 % (ref 3.8–10.2)
NEUTROPHILS # BLD: 76.4 % (ref 42.8–82.8)
PLATELET # BLD AUTO: 245 K/MM3 (ref 134–434)
PMV BLD: 7.6 FL (ref 7.5–11.1)
RBC # BLD AUTO: 2.99 M/MM3 (ref 3.6–5.2)
WBC # BLD AUTO: 9.4 K/MM3 (ref 4–10)

## 2019-02-08 RX ADMIN — ACETAMINOPHEN PRN MG: 325 TABLET ORAL at 07:05

## 2019-02-08 RX ADMIN — ACETAMINOPHEN PRN MG: 325 TABLET ORAL at 13:13

## 2019-02-08 RX ADMIN — FERROUS SULFATE TAB EC 324 MG (65 MG FE EQUIVALENT) SCH MG: 324 (65 FE) TABLET DELAYED RESPONSE at 07:06

## 2019-02-08 RX ADMIN — Medication SCH TAB: at 10:54

## 2019-02-08 RX ADMIN — ACETAMINOPHEN PRN MG: 325 TABLET ORAL at 18:42

## 2019-02-08 RX ADMIN — SIMETHICONE CHEW TAB 80 MG PRN MG: 80 TABLET ORAL at 13:14

## 2019-02-08 RX ADMIN — ACETAMINOPHEN PRN MG: 325 TABLET ORAL at 00:15

## 2019-02-08 RX ADMIN — ACETAMINOPHEN PRN MG: 325 TABLET ORAL at 22:46

## 2019-02-08 RX ADMIN — SIMETHICONE CHEW TAB 80 MG PRN MG: 80 TABLET ORAL at 07:06

## 2019-02-08 NOTE — PN
Progress Note (short form)





- Note


Progress Note: 





Patient seen and evaluated, c/o chest pain.


She was seen by hospitalist.


She's status post 4 units of blood transfusion.


Hgb is still low.





Consider neoplasm screening as outpatient.





Problem List





- Problems


(1) Previous  section complicating pregnancy, antepartum condition or 

complication


Code(s): O34.219 - MATERNAL CARE FOR UNSP TYPE SCAR FROM PREVIOUS  DEL 

  





(2) Multiparity


Code(s): Z64.1 - PROBLEMS RELATED TO MULTIPARITY   





(3) Severe anemia


Code(s): D64.9 - ANEMIA, UNSPECIFIED

## 2019-02-08 NOTE — PN
Teaching Attending Note


Name of Resident: Duane Rodriguez





ATTENDING PHYSICIAN STATEMENT





I saw and evaluated the patient.


I reviewed the resident's note and discussed the case with the resident.


I agree with the resident's findings and plan as documented.








SUBJECTIVE:c/o CP worse on inspiration. pain has been constant since it first 

started on tuesday. felt warm last night but had her temp taken at that time 

and no fever.  no other assoc symptoms. has not changed in nature at all. 

denies cough, chills, N/V/C/D, dysuria








OBJECTIVE:


 Last Vital Signs











Temp Pulse Resp BP Pulse Ox


 


 98.6 F   78   20   112/84   100 


 


 19 21:34  19 21:34  19 21:34  19 21:34  19 13:30








General NAD


CV S1 s2 RRR no murmur/rub/gallop +chest wall tenderness


Lungs CTA B/L no wheezing/rales/rhonchi








ASSESSMENT AND PLAN:


37yo F with PMH ETOH abuse admitted after repeat  with medical consult 

placed for CP


1. CP- likely symptomatic anemia vs musculoskeltal although can not r/o ACS.  

EKG and cardiac markers neg x2. low suspicion for ACS due to persisting for > 

48H. pt is concerned that there is no relief at this point. counselled her that 

that muscular injuries may take some time to improve. will obtain CXR to r/o 

any developing infiltrate or pathology that can be related to her pain. cont 

pain management. avoid NSAID use with anemia.


2. microcytic anemia- s/p 4 units PRBC this hospital stay. likely due to 

previous gastric bypass. likely has poor absorption of oral iron at this time 

and will give venofer while hospitalized. will need close monitoring of hgb.

## 2019-02-08 NOTE — PN
Physical Exam: 


SUBJECTIVE: Patient seen and examined this AM. She states she is overall 

feeling better. She is still having some chest discomfort worse with movement 

and deep inspiration which is reproducible to palpation. States the most relief 

she has gotten was from the IV caldalor. 








OBJECTIVE:





 Vital Signs











 Period  Temp  Pulse  Resp  BP Sys/Landon  Pulse Ox


 


 Last 24 Hr  98.3 F-98.6 F  78-84  20-20  112-130/78-84  











GENERAL: A&O, no acute distress


HEAD: Normocephalic, atraumatic.


EYES: no scleral icterus


EARS, NOSE, THROAT: Moist mucous membranes.


LUNGS: CTA b/l, no crackles or wheezes


HEART: Regular rate and rhythm, normal S1 and S2 without murmur


MUSCULOSKELETAL: Tender to palpation in right shoulder, chest wall, and left 

shoulder


EXTREMITIES: warm, well-perfused. No peripheral edema. 


NEUROLOGICAL:  Cranial nerves II-XII grossly intact. Normal speech. 

















 Laboratory Results - last 24 hr











  19





  10:08 16:30 05:56


 


WBC    9.4


 


RBC    2.99 L


 


Hgb    7.7 L


 


Hct    22.9 L


 


MCV    76.5 L


 


MCH    25.9


 


MCHC    33.9


 


RDW    25.7 H


 


Plt Count    245


 


MPV    7.6


 


Absolute Neuts (auto)    7.2


 


Neutrophils %    76.4


 


Lymphocytes %    14.6  D


 


Monocytes %    7.0


 


Eosinophils %    1.6  D


 


Basophils %    0.4


 


Nucleated RBC %    0


 


Blood Type  O NEGATIVE  


 


Antibody Screen  Positive  


 


Antibody Identification  Adig  


 


Crossmatch  See Detail  See Detail 








Active Medications











Generic Name Dose Route Start Last Admin





  Trade Name Freq  PRN Reason Stop Dose Admin


 


Acetaminophen  650 mg  19 10:26  19 07:05





  Tylenol -  PO   650 mg





  Q4H PRN   Administration





  PAIN LEVEL 1-3   





     





     





     


 


Bisacodyl  10 mg  19 12:14  





  Dulcolax Suppository -  RC   





  PRN PRN   





  CONSTIPATION   





     





     





     


 


Diphenhydramine HCl  25 mg  19 10:26  





  Benadryl Injection -  IVPUSH   





  Q4H PRN   





  Pruritis   





     





     





     


 


Ferrous Sulfate  325 mg  19 17:30  19 07:06





  Feosol -  PO   325 mg





  BIDWM JOSE   Administration





     





     





     





     


 


Oxytocin/Sodium Chloride  20 unit in 1,000 mls @ 125 mls/hr  19 12:15   23:36





  Normal Saline+20 Units Oxytocin -  IV   Not Given





  ASDIR JOSE   





     





     





     





     


 


Methylergonovine Maleate  0.2 mg  19 12:14  





  Methergine Injection -  IM   





  Q4H PRN   





  Excessive Bleeding (L&D)   





     





     





     


 


Oxycodone HCl  5 mg  19 12:14  19 07:05





  Roxicodone -  PO   5 mg





  Q4H PRN   Administration





  PAIN LEVEL 4 - 6   





     





     





     


 


Prenatal Multivit/Folic Acid/Iron  1 tab  19 10:00  19 10:25





  Prenatal Vitamins (Sjr) -  PO   1 tab





  DAILY JOSE   Administration





     





     





     





     


 


Simethicone  80 mg  19 12:14  19 07:06





  Mylicon -  PO   80 mg





  Q4H PRN   Administration





  GAS   





     





     





     











ASSESSMENT/PLAN:


38 year old female,  @ 37 weeks gestation, BRENDA was 19, came in with 

contractions underwent  19, medicine consulted due to complaining 

of chest pain with Hgb 5.4





Symptomatic Anemia vs Musculoskeletal chest pain


-H/H 5.4/16.7 with hx prior transfusion dependent anemia


-s/p 3 units PRBCs, H/H stable


-Troponin negative, EKG without any concerning ST/Twave changes


-Recommend trend H/H until discharge and CBC within 1 week of discharge


-Pain at this point likely MSK in origin as it is reproducible and improves 

with NSAIDs


-Would continue NSAIDs as per OB recs





s/p 


-stable, as per OB





DVT Prophylaxis


-Early Ambulation





FEN


-Fluids: NS + 20 units Oxytocin @ 125 cc/hr


-Electrolytes: No electrolyte abnormalities, BMP in AM


-Nutrition: Regular Diet





Disposition


Will sign off for now, thank you for this consultative opportunity, please 

recall Medicine Service as needed





Visit type





- Emergency Visit


Emergency Visit: No





- New Patient


This patient is new to me today: Yes


Date on this admission: 19





- Critical Care


Critical Care patient: No

## 2019-02-08 NOTE — EKG
Test Reason : 

Blood Pressure : ***/*** mmHG

Vent. Rate : 081 BPM     Atrial Rate : 081 BPM

   P-R Int : 144 ms          QRS Dur : 086 ms

    QT Int : 398 ms       P-R-T Axes : 037 -01 030 degrees

   QTc Int : 462 ms

 

NORMAL SINUS RHYTHM

NORMAL ECG

WHEN COMPARED WITH ECG OF 22-SEP-2017 10:13,

VENT. RATE HAS INCREASED BY  31 BPM

Confirmed by ORLY HALEY MD ([id number]) on 2/8/2019 1:26:32 PM

 

Referred By:             Confirmed By:ORLY HALEY MD

## 2019-02-09 VITALS — HEART RATE: 65 BPM | SYSTOLIC BLOOD PRESSURE: 127 MMHG | DIASTOLIC BLOOD PRESSURE: 78 MMHG | TEMPERATURE: 97.7 F

## 2019-02-09 LAB
DEPRECATED RDW RBC AUTO: 25.8 % (ref 11.6–15.6)
HCT VFR BLD CALC: 23.7 % (ref 32.4–45.2)
HGB BLD-MCNC: 7.8 GM/DL (ref 10.7–15.3)
MCH RBC QN AUTO: 25.6 PG (ref 25.7–33.7)
MCHC RBC AUTO-ENTMCNC: 33.1 G/DL (ref 32–36)
MCV RBC: 77.2 FL (ref 80–96)
PLATELET # BLD AUTO: 268 K/MM3 (ref 134–434)
PMV BLD: 7.7 FL (ref 7.5–11.1)
RBC # BLD AUTO: 3.07 M/MM3 (ref 3.6–5.2)
WBC # BLD AUTO: 6.8 K/MM3 (ref 4–10)

## 2019-02-09 RX ADMIN — ACETAMINOPHEN PRN MG: 325 TABLET ORAL at 13:28

## 2019-02-09 RX ADMIN — ACETAMINOPHEN PRN MG: 325 TABLET ORAL at 09:04

## 2019-02-09 RX ADMIN — ACETAMINOPHEN PRN MG: 325 TABLET ORAL at 02:28

## 2019-02-09 NOTE — HOSP
Subjective





- Review of Symptoms


Subjective: 





pt remains to have chest discomfort worse on movement and deep inspiration. no 

cough or hemoptysis


reveived a total of 4 units PRBC and CBC now stable





Recommendations


- CP likely muscular and should improve with time if it persists should follow 

up with PMD for further workup


-iron def anemia- received venofer x2 doses here. would cont iron 

supplementation however unsure how helpful due to previous gastric bypass and 

ability to absorb. Should follow up with PMD next week for repeat CBC to 

monitor Hgb and consider further iron IV transfusions as outpatient. should 

have anemia workup done to determine etiology and exclude malignancy


-obesity- lifestyle modifications








Thank you for the consult. please re-consult as needed





Physical Examination


Vital Signs: 


 Vital Signs











Temperature  97.7 F   02/09/19 10:00


 


Pulse Rate  65   02/09/19 10:00


 


Respiratory Rate  20   02/09/19 10:00


 


Blood Pressure  127/78   02/09/19 10:00


 


O2 Sat by Pulse Oximetry (%)  100   02/05/19 13:30











Labs: 


 CBC, BMP





 02/09/19 08:00 





 02/05/19 10:08

## 2019-02-09 NOTE — DS
Physical Exam-GYN


Vital Signs: 


 Vital Signs











Temperature  97.7 F   19 10:00


 


Pulse Rate  65   19 10:00


 


Respiratory Rate  20   19 10:00


 


Blood Pressure  127/78   19 10:00


 


O2 Sat by Pulse Oximetry (%)  100   19 13:30











Constitutional: Yes: No Distress


HENT: Yes: Atraumatic


Neck: Yes: Trachea Midline


Cardiovascular: Yes: Regular Rate and Rhythm


Respiratory: No: Accessory Muscle Use, Rales, SOB, SOB on Exertion, Tachypnea, 

Wheezes


Gastrointestinal: Yes: Normal Bowel Sounds


External Genitalia: Yes: Normal


Vaginal Exam: Yes: Normal


Cervix: Yes: Normal


Uterus: Yes: Firm


Wound/Incision: Yes: Clean/Dry, Steri Strips (in place)


Neurological: Yes: Alert, Oriented


...Motor Strength: WNL


Psychiatric: Yes: Alert, Oriented


Labs: 


 CBC, BMP





 19 08:00 





 19 10:08 











Delivery





- Delivery


Type of Anesthesia: Spinal


Episiotomy/Laceration: None


EBL (cc): 700





Delivery, Single Birth





- Stages of Labor


Date 1st Stage Initiatied: 19


Time 1st Stage Initiated: 01:00


Date of Delivery: 19


Time of Delivery: 11:35


Time Placenta Delivered: 11:36





- Condition of Infant


Pediatrician/Neonatologist Present: No


Name: Maritza Jaeger


Infant Gender: Male


Birth Weight: 4 lb 7 oz


Position: Left, OT


Total Hours ROM (Hrs/Mins): 2MIN





- Apgar


  ** 1 Minute


Apgar Total Score: 9





  ** 5 Minutes


Apgar Total Score: 9





- [place] Feeding Plan


Initial Plan: Elected not to breastfeed exclusively throughout hospitalization





Discharge Summary


Reason For Visit: 


Current Active Problems





 delivery delivered (Acute)


Multiparity (Acute)


Previous  section complicating pregnancy, antepartum condition or 

complication (Acute)


Severe anemia (Acute)


Status post repeat low transverse  section (Acute)








Procedures: Principal: Repeat Low Transverse  / Tubal ligation


Hospital Course: 





Patient admitted for post op care.


She received blood transfusion for severe anemia.


A CXR showed evidence of a small pneumothorax.


Condition: Good





- Instructions


Diet, Activity, Other Instructions: 


Regular diet


No driving, no lifting x 4 weeks.


F/U with MD in one week


Disposition: HOME





- Home Medications


Comprehensive Discharge Medication List: 


Ambulatory Orders





Prenatal Vit 108/Iron/Folic AC [Prenatal One Tablet] 1 tab PO DAILY 19 


Ferralet 90 Tablet 1 tab PO DAILY 19

## 2019-02-15 NOTE — PATH
Surgical Pathology Report



Patient Name:  SANCHEZ QUIROGA

Accession #:  

Med. Rec. #:  [id number]                                                        

   /Age/Gender:  1980 (Age: 38) / F

Account:  [id number]                                                          

             Location: [facility] OBS/GYN

Taken:  2019

Received:  2019

Reported:  2/15/2019

Physicians:  Melissa Bond M.D.

  



Specimen(s) Received

A: PLACENTA 

B: PORTION OF RIGHT FALLOPIAN TUBE 

C: PORTION OF LEFT FALLOPIAN TUBE 





Clinical History

, term 6,  1, living 9

 , ,  (twins), , 

  at 28 weeks







Final Diagnosis

A.  PLACENTA,  SECTION: 

303 G THIRD TRIMESTER PLACENTA WITH TRIVASCULAR UMBILICAL CORD AND UNREMARKABLE

PLACENTAL MEMBRANES.



B.  FALLOPIAN TUBE, RIGHT, PARTIAL EXCISION:

FULL LUMINAL PORTION OF UNREMARKABLE FALLOPIAN TUBE.



C.  FALLOPIAN TUBE, LEFT, PARTIAL EXCISION:

FULL LUMINAL PORTION OF UNREMARKABLE FALLOPIAN TUBE.







***Electronically Signed***

Chayito Ying M.D.





Gross Description

A.  The specimen is received fresh labeled placenta and is a 303 gram, 12.0 x

12.0 x 2.7 cm. placenta with attached membranes and umbilical cord.  The

attached membranes are tan, translucent with focal opacities and insert

marginally.  The umbilical cord measures 27 cm. in length and averages 1 cm. in

diameter.  The cord inserts centrally. No true knots or strictures are

identified. Cut surface of the umbilical cord reveals 3 vessels. The fetal

surface is grey blue with moderate fibrin deposition and appropriate caliber

vessel. The maternal surface is red-brown with focal defects.  Sectioning

reveals red-brown, spongy parenchyma.  No lesions are identified. 

Representative sections are submitted in three cassettes as follows: 1- membrane

rolls and umbilical cord; 2-3- full thickness sections of placenta.



B. Received in formalin labeled "right portion of fallopian tube," is a 1.5 cm

in length portion of fallopian tube. No fimbria are present. The outer surface

is tan-gray and smooth. Sectioning reveals an unremarkable lumen. Representative

sections are submitted in one cassette.



C. Received in formalin labeled "left portion of fallopian tube," is a 1.7 cm in

length portion of fallopian tube. No fimbria are present. The outer surface is

tan-gray and smooth. Sectioning reveals an unremarkable lumen. Representative

sections are submitted in one cassette.

## 2019-06-26 ENCOUNTER — HOSPITAL ENCOUNTER (INPATIENT)
Dept: HOSPITAL 74 - JER | Age: 39
LOS: 7 days | Discharge: ANOTHER HOSPITAL | DRG: 433 | End: 2019-07-03
Attending: INTERNAL MEDICINE | Admitting: INTERNAL MEDICINE
Payer: COMMERCIAL

## 2019-06-26 VITALS — BODY MASS INDEX: 28.4 KG/M2

## 2019-06-26 DIAGNOSIS — R16.2: ICD-10-CM

## 2019-06-26 DIAGNOSIS — E86.0: ICD-10-CM

## 2019-06-26 DIAGNOSIS — I86.8: ICD-10-CM

## 2019-06-26 DIAGNOSIS — K59.00: ICD-10-CM

## 2019-06-26 DIAGNOSIS — J98.11: ICD-10-CM

## 2019-06-26 DIAGNOSIS — Z98.84: ICD-10-CM

## 2019-06-26 DIAGNOSIS — K21.9: ICD-10-CM

## 2019-06-26 DIAGNOSIS — F10.129: ICD-10-CM

## 2019-06-26 DIAGNOSIS — E80.6: ICD-10-CM

## 2019-06-26 DIAGNOSIS — K70.9: ICD-10-CM

## 2019-06-26 DIAGNOSIS — Z80.0: ICD-10-CM

## 2019-06-26 DIAGNOSIS — K70.10: Primary | ICD-10-CM

## 2019-06-26 DIAGNOSIS — K62.5: ICD-10-CM

## 2019-06-26 DIAGNOSIS — I27.20: ICD-10-CM

## 2019-06-26 DIAGNOSIS — E46: ICD-10-CM

## 2019-06-26 DIAGNOSIS — E53.8: ICD-10-CM

## 2019-06-26 DIAGNOSIS — D62: ICD-10-CM

## 2019-06-26 DIAGNOSIS — R21: ICD-10-CM

## 2019-06-26 DIAGNOSIS — K80.40: ICD-10-CM

## 2019-06-26 DIAGNOSIS — D50.9: ICD-10-CM

## 2019-06-26 DIAGNOSIS — D63.8: ICD-10-CM

## 2019-06-26 DIAGNOSIS — Z87.891: ICD-10-CM

## 2019-06-26 DIAGNOSIS — K52.9: ICD-10-CM

## 2019-06-26 DIAGNOSIS — B96.29: ICD-10-CM

## 2019-06-26 DIAGNOSIS — N39.0: ICD-10-CM

## 2019-06-26 DIAGNOSIS — K70.0: ICD-10-CM

## 2019-06-26 LAB
ALBUMIN SERPL-MCNC: 2.4 G/DL (ref 3.4–5)
ALP SERPL-CCNC: 152 U/L (ref 45–117)
ALT SERPL-CCNC: 47 U/L (ref 13–61)
ANION GAP SERPL CALC-SCNC: 10 MMOL/L (ref 8–16)
ANISOCYTOSIS BLD QL: (no result)
APPEARANCE UR: (no result)
AST SERPL-CCNC: 111 U/L (ref 15–37)
BACTERIA # UR AUTO: 6464.4 /HPF
BASOPHILS # BLD: 0.3 % (ref 0–2)
BILIRUB SERPL-MCNC: 9.5 MG/DL (ref 0.2–1)
BILIRUB UR STRIP.AUTO-MCNC: (no result) MG/DL
BUN SERPL-MCNC: 4.3 MG/DL (ref 7–18)
CALCIUM SERPL-MCNC: 8.4 MG/DL (ref 8.5–10.1)
CASTS URNS QL MICRO: 68 /LPF (ref 0–8)
CHLORIDE SERPL-SCNC: 106 MMOL/L (ref 98–107)
CO2 SERPL-SCNC: 26 MMOL/L (ref 21–32)
COLOR UR: (no result)
CREAT SERPL-MCNC: 1.2 MG/DL (ref 0.55–1.3)
DEPRECATED RDW RBC AUTO: 14.9 % (ref 11.6–15.6)
EOSINOPHIL # BLD: 0.2 % (ref 0–4.5)
EPITH CASTS URNS QL MICRO: 2 /HPF
GLUCOSE SERPL-MCNC: 50 MG/DL (ref 74–106)
HCT VFR BLD CALC: 33.4 % (ref 32.4–45.2)
HGB BLD-MCNC: 11.4 GM/DL (ref 10.7–15.3)
INR BLD: 1.89 (ref 0.83–1.09)
KETONES UR QL STRIP: (no result)
LEUKOCYTE ESTERASE UR QL STRIP.AUTO: (no result)
LIPASE SERPL-CCNC: 60 U/L (ref 73–393)
LYMPHOCYTES # BLD: 5.3 % (ref 8–40)
MACROCYTES BLD QL: (no result)
MCH RBC QN AUTO: 36.3 PG (ref 25.7–33.7)
MCHC RBC AUTO-ENTMCNC: 34 G/DL (ref 32–36)
MCV RBC: 106.8 FL (ref 80–96)
MONOCYTES # BLD AUTO: 4.9 % (ref 3.8–10.2)
NEUTROPHILS # BLD: 89.3 % (ref 42.8–82.8)
NITRITE UR QL STRIP: POSITIVE
PH UR: 5.5 [PH] (ref 5–8)
PLATELET # BLD AUTO: 242 K/MM3 (ref 134–434)
PLATELET BLD QL SMEAR: NORMAL
PMV BLD: 9.4 FL (ref 7.5–11.1)
POTASSIUM SERPLBLD-SCNC: 3.3 MMOL/L (ref 3.5–5.1)
PROT SERPL-MCNC: 6.6 G/DL (ref 6.4–8.2)
PROT UR QL STRIP: (no result)
PROT UR QL STRIP: NEGATIVE
PT PNL PPP: 22.4 SEC (ref 9.7–13)
RBC # BLD AUTO: 3.13 M/MM3 (ref 3.6–5.2)
RBC # BLD AUTO: 36.6 /HPF (ref 0–4)
SODIUM SERPL-SCNC: 141 MMOL/L (ref 136–145)
SP GR UR: 1.02 (ref 1.01–1.03)
TARGETS BLD QL SMEAR: (no result)
UROBILINOGEN UR STRIP-MCNC: 2 MG/DL (ref 0.2–1)
WBC # BLD AUTO: 10.6 K/MM3 (ref 4–10)
WBC # UR AUTO: 48 /HPF (ref 0–5)
YEAST BUDDING URNS QL: (no result)

## 2019-06-26 PROCEDURE — A9579 GAD-BASE MR CONTRAST NOS,1ML: HCPCS

## 2019-06-26 NOTE — HP
Admitting History and Physical





- Admission


History of Present Illness: 





The patient is a 39 year old female, with a significant PMH of etoh abuse(prior 

admission for EtOH intoxication), anemia, and gastroesophageal reflux. Pt 

presented to the ER evaluation of abdominal pain for 3 days. Patient notes that 

her pain began in the right upper quadrant , and had progressively traveled 

diffusely throughout her abdomen, bilateral flanks, and right side of the low 

back. She notes the pain is constant in nature, which she rates as a 10/10 and 

is exacerbated with positional changes. She endorses 2 episodes of NBNB nausea 

and vomit, with decreased PO intake. Patient additionally notes multiple 

episodes of bloody, loose stool, and states she sees everything as orange. 





- Past Medical History


Gastrointestinal: Yes: GERD


Heme/Onc: Yes: Anemia


Psych: Yes: Other (Etoh abuse)





- Past Surgical History


Past Surgical History: Yes: Appendectomy, 


Additional Past Surgical History: 





Gastric sleeve





- Smoking History


Smoking history: Never smoked





- Alcohol/Substance Use


Hx Alcohol Use: No





Home Medications





- Allergies


Allergies/Adverse Reactions: 


 Allergies











Allergy/AdvReac Type Severity Reaction Status Date / Time


 


No Known Allergies Allergy   Verified 19 10:12














- Home Medications


Home Medications: 


Ambulatory Orders





Mv-Mn/Iron/FA/Herbal/Digestive [Prenatal One Tablet] 1 tab PO DAILY 19 











Family Disease History





- Family Disease History


Family History: Unremarkable





Review of Systems





- Review of Systems


Constitutional: reports: Loss of Appetite, Weakness


Eyes: reports: Recent Change in Vision


HENT: reports: No Symptoms


Neck: reports: No Symptoms


Cardiovascular: reports: No Symptoms


Respiratory: reports: No Symptoms


Gastrointestinal: reports: Abdominal Pain, Constipation ( +), Dysphagia





Physical Examination


Vital Signs: 


 Vital Signs











Temperature  98.3 F   19 19:20


 


Pulse Rate  111 H  19 19:20


 


Respiratory Rate  20   19 19:20


 


Blood Pressure  105/65   19 19:20


 


O2 Sat by Pulse Oximetry (%)  98   19 19:20











Labs: 


 CBC, BMP





 19 12:34 





 19 12:36 











Problem List





- Problems


(1) Abdominal pain


Assessment/Plan: 


?Due to colitis vs alcoholic hepatitis


 IV antribxs


Monitor labs


Cont IVF


GI/surgical consutls





Code(s): R10.9 - UNSPECIFIED ABDOMINAL PAIN   





(2) Anemia


Assessment/Plan: 


Due to chronic dz


Follow labs


Code(s): D64.9 - ANEMIA, UNSPECIFIED   





(3) Hyperbilirubinemia


Code(s): E80.6 - OTHER DISORDERS OF BILIRUBIN METABOLISM   





(4) S/P gastric bypass


Code(s): Z98.84 - BARIATRIC SURGERY STATUS   





(5) Alcoholic liver disorder


Code(s): K70.9 - ALCOHOLIC LIVER DISEASE, UNSPECIFIED

## 2019-06-26 NOTE — PDOC
Documentation entered by Shahnaz Gomez SCRIBE, acting as scribe for Jessica Acosta MD.








Jessica Acosta MD:  This documentation has been prepared by the Patricia regan Adrianna, SCRIBE, under my direction and personally reviewed by me in its 

entirety.  I confirm that the documentation accurately reflects all work, 

treatment, procedures, and medical decision making performed by me.  





History of Present Illness





- General


Chief Complaint: Jaundice


Stated Complaint: BODY ACHES / BLOODY STOOL


History Source: Patient


Exam Limitations: No Limitations





- History of Present Illness


Initial Comments: 


The patient is a 39 year old female, with a significant PMH of liver 

inflammation and kidney infection, gastric bypass, appendicitis, prior 

admission for EtOH intoxication, acute anemia, and gastroesophageal reflux, who 

presents to the ED for evaluation of abdominal pain for 3 days. Patient notes 

that her pain began in the right upper quadrant , and had progressively 

traveled diffusely throughout her abdomen, bilateral flanks, and right side of 

the low back. She notes the pain is constant in nature, which she rates as a 10/

10 and is exacerbated with positional changes. She endorses 2 episodes of NBNB 

nausea and vomit, with decreased PO intake. Patient additionally notes multiple 

episodes of bloody, loose stool, and states she sees everything as orange. 

She reports associated chest pain and shortness of breath. 





Denies fever, chills, rash, recent travel, or consumption of outside food.  





Allergies: NKA, NKDA 


Surgical History: 2 C-sections, appendectomy, gastric bypass 


Social History: prior admission for EtOH intoxication, former smoker 


Family history: Colon cancer on both mother and fathers side 


PCP: Dr. Anjel Shaffer 





06/26/19 14:00








Past History





- Past Medical History


Allergies/Adverse Reactions: 


 Allergies











Allergy/AdvReac Type Severity Reaction Status Date / Time


 


No Known Allergies Allergy   Verified 06/26/19 11:36











Home Medications: 


Ambulatory Orders





NK [No Known Home Medication]  06/26/19 








COPD: No





- Immunization History


Immunization Up to Date: Yes





- Suicide/Smoking/Psychosocial Hx


Smoking History: Never smoked


Information on smoking cessation initiated: No


Hx Alcohol Use: No


Drug/Substance Use Hx: No





**Review of Systems





- Review of Systems


Comments:: 


GENERAL/CONSTITUTIONAL: No fever or chills. No weakness.


HEAD, EYES, EARS, NOSE AND THROAT: +Seeing everything in orange. No ear pain 

or discharge. No sore throat.


CARDIOVASCULAR: +Chest pain. +Shortness of breath.  


RESPIRATORY: No cough, wheezing, or hemoptysis.


GASTROINTESTINAL: +Nausea. +2 episodes of NBNB vomit. +Multiple episodes of 

loose, bloody stool. +Decreased PO intake. No constipation.


GENITOURINARY: No dysuria, frequency, or change in urination.


ABDOMINAL: +RUQ pain. +Diffuse abdominal pain. 


MUSCULOSKELETAL: +Bilateral flank pain. +Right-sided low back pain.  No joint 

or muscle swelling. No neck pain.


SKIN: No rash


NEUROLOGIC: No headache, vertigo, loss of consciousness, or change in strength/

sensation.


ENDOCRINE: No increased thirst. No abnormal weight change.


HEMATOLOGIC/LYMPHATIC: No anemia, easy bleeding, or history of blood clots.


ALLERGIC/IMMUNOLOGIC: No hives or skin allergy.





06/26/19 14:01








*Physical Exam





- Vital Signs


 Last Vital Signs











Temp Pulse Resp BP Pulse Ox


 


 99.8 F H  121 H  19   91/56 L  100 


 


 06/26/19 11:36  06/26/19 11:36  06/26/19 11:36  06/26/19 11:36  06/26/19 11:36














- Physical Exam


Comments: 


GENERAL: +Jaundice. Awake, alert, and oriented.


HEAD: Normal with no signs of trauma.


EYES: +Scleral icterus. PERRLA, EOMI.


ENT: +Dry mucous membranes. Ears normal, nares patent, oropharynx clear without 

exudates.


NECK: Normal range of motion, supple without lymphadenopathy, JVD, or masses.


LUNGS: Breath sounds equal, clear to auscultation bilaterally. No wheezes, and 

no crackles.


HEART:Regular rate and rhythm, normal S1 and S2 without murmur, rub or gallop.


ABDOMEN: +Diffuse abdominal tenderness to palpation with voluntary guarding. 

Soft, normoactive bowel sounds. No rebound. No masses palpable. No fluid wave 

palpable. 


RECTAL: +Greenish, non-bloody stool. No external hemorrhoids. 


EXTREMITIES: Normal range of motion, no edema. No clubbing or cyanosis. No 

erythema, or tenderness.


NEUROLOGICAL: Cranial nerves II through XII grossly intact. Normal speech. No 

focal neurological deficits.


MUSCULOSKELETAL: Back non-tender to palpation, no CVA tenderness


SKIN: +Jaundice. +Erythematous rash on cheeks. Warm, Dry, normal turgor, no 

lesions noted.





06/26/19 14:18








ED Treatment Course





- LABORATORY


CBC & Chemistry Diagram: 


 06/26/19 12:34





 06/26/19 12:36





- ADDITIONAL ORDERS


Additional order review: 


 Laboratory  Results











  06/26/19 06/26/19 06/26/19





  12:45 12:36 12:34


 


PT with INR   


 


INR   


 


Sodium   141 


 


Potassium   3.3 L 


 


Chloride   106 


 


Carbon Dioxide   26 


 


Anion Gap   10 


 


BUN   4.3 L 


 


Creatinine   1.2 


 


Est GFR (CKD-EPI)AfAm   65.93 


 


Est GFR (CKD-EPI)NonAf   56.88 


 


Random Glucose   50 L 


 


Calcium   8.4 L 


 


Total Bilirubin   9.5 H 


 


AST   111 H 


 


ALT   47 


 


Alkaline Phosphatase   152 H 


 


Total Protein   6.6 


 


Albumin   2.4 L 


 


Lipase   60 L 


 


Urine Color  Dk yellow  


 


Urine Appearance  Cloudy  


 


Urine pH  5.5  


 


Ur Specific Gravity  1.019  


 


Urine Protein  1+ H  


 


Urine Glucose (UA)  Negative  


 


Urine Ketones  Trace H  


 


Urine Blood  3+ H  


 


Urine Nitrite  Positive H  


 


Urine Bilirubin  3+ H  


 


Urine Urobilinogen  2.0 H  


 


Ur Leukocyte Esterase  2+ H  


 


Urine WBC (Auto)  48  


 


Urine RBC (Auto)  36.6  


 


Urine Casts (Auto)  68  


 


U Pathogenic Cast Auto  None seen  


 


U Epithel Cells (Auto)  2.0  


 


Urine Bacteria (Auto)  6464.4  


 


Urine Yeast (Auto)  None seen  


 


Urine HCG, Qual  Negative  


 


Blood Type    O NEGATIVE


 


Antibody Screen    Negative














  06/26/19





  12:34


 


PT with INR  22.40 H


 


INR  1.89 H


 


Sodium 


 


Potassium 


 


Chloride 


 


Carbon Dioxide 


 


Anion Gap 


 


BUN 


 


Creatinine 


 


Est GFR (CKD-EPI)AfAm 


 


Est GFR (CKD-EPI)NonAf 


 


Random Glucose 


 


Calcium 


 


Total Bilirubin 


 


AST 


 


ALT 


 


Alkaline Phosphatase 


 


Total Protein 


 


Albumin 


 


Lipase 


 


Urine Color 


 


Urine Appearance 


 


Urine pH 


 


Ur Specific Gravity 


 


Urine Protein 


 


Urine Glucose (UA) 


 


Urine Ketones 


 


Urine Blood 


 


Urine Nitrite 


 


Urine Bilirubin 


 


Urine Urobilinogen 


 


Ur Leukocyte Esterase 


 


Urine WBC (Auto) 


 


Urine RBC (Auto) 


 


Urine Casts (Auto) 


 


U Pathogenic Cast Auto 


 


U Epithel Cells (Auto) 


 


Urine Bacteria (Auto) 


 


Urine Yeast (Auto) 


 


Urine HCG, Qual 


 


Blood Type 


 


Antibody Screen 








 











  06/26/19





  12:34


 


RBC  3.13 L


 


MCV  106.8 H


 


MCHC  34.0


 


RDW  14.9


 


MPV  9.4


 


Neutrophils %  89.3 H


 


Lymphocytes %  5.3 L


 


Monocytes %  4.9


 


Eosinophils %  0.2


 


Basophils %  0.3














- RADIOLOGY


Radiology Studies Ordered: 














 Category Date Time Status


 


 ABDOMEN & PELVIS CT WITH CONTR [CT] Stat CT Scan  06/26/19 13:32 Ordered


 


 ABDOMEN US -LIMITED [US] Stat Ultrasound  06/26/19 13:32 Ordered











Radiograph Interpretation: 


EXAM#: TYPE/EXAM: RESULT: [id number] US/ABDOMEN US -LIMITED HISTORY PROVIDED: 

Right upper quadrant pain and jaundice. 


IMPRESSION: 1. Thick-walled gallbladder with no evidence of cholelithiasis. If 

acute cholecystitis is suspected , a follow-up HIDA scan is recommended. 2. 

Hepatomegaly with diffuse fatty infiltration of the liver. Please see above 

discussion. 


Reported By: Satinder Gonzales MD 06/26/19 15:19 





06/26/19 16:00








- Medications


Given in the ED: 


ED Medications














Discontinued Medications














Generic Name Dose Route Start Last Admin





  Trade Name Freq  PRN Reason Stop Dose Admin


 


Morphine Sulfate  4 mg  06/26/19 13:33  06/26/19 13:43





  Morphine Injection -  IVPUSH  06/26/19 13:34  4 mg





  ONCE ONE   Administration





     





     





     





     














- Consult/PCP


Case discussed with consulting physician: Shasta Lemons (18:51pm- paged Dr. Lemons)


Consult Reason/Comments: 19:00pm- spoke with Dr. Lemons





Medical Decision Making





- Medical Decision Making





06/26/19 14:16


EKG - NSR rate of 113 bpm, axis nml, intervals nml, no st elevation or 

depression, t waves flattened


06/26/19 14:53


 Laboratory Tests











  06/26/19 06/26/19 06/26/19





  12:34 12:34 12:36


 


WBC  10.6 H  


 


Hgb  11.4  


 


Hct  33.4  


 


Plt Count  242  


 


INR   1.89 H 


 


Sodium    141


 


Potassium    3.3 L


 


Chloride    106


 


Carbon Dioxide    26


 


BUN    4.3 L


 


Creatinine    1.2


 


Random Glucose    50 L


 


Lactic Acid   


 


AST    111 H


 


ALT    47


 


Lipase    60 L


 


Urine Nitrite   


 


Ur Leukocyte Esterase   


 


Urine WBC (Auto)   


 


Urine RBC (Auto)   


 


Urine Bacteria (Auto)   


 


Urine HCG, Qual   














  06/26/19 06/26/19





  12:45 13:40


 


WBC  


 


Hgb  


 


Hct  


 


Plt Count  


 


INR  


 


Sodium  


 


Potassium  


 


Chloride  


 


Carbon Dioxide  


 


BUN  


 


Creatinine  


 


Random Glucose  


 


Lactic Acid   2.9 H*


 


AST  


 


ALT  


 


Lipase  


 


Urine Nitrite  Positive H 


 


Ur Leukocyte Esterase  2+ H 


 


Urine WBC (Auto)  48 


 


Urine RBC (Auto)  36.6 


 


Urine Bacteria (Auto)  6464.4 


 


Urine HCG, Qual  Negative 











Evidence of UTI  


Slight elevation with AST


Awaiting CT abd pelvis


Gentle hydration


Morphine for pain


Given LFTs are wnl, will give Tylenol for fever





06/26/19 16:31


CT still pending because IR procedure





06/26/19 18:27


Pt still has not had her CT scan at this point


06/26/19 18:52


Received call from Dr Tong


Pt has diffuse colitis


No GB pathology


Diffuse fatty infiltration


Will add Flagyl


Call initially placed to Dr Jensen


It is actually Dr Shaffer patient


Will call Dr Lemons





*DC/Admit/Observation/Transfer


Diagnosis at time of Disposition: 


 Biliary colic, Cholecystitis, Colitis








- Discharge Dispostion


Condition at time of disposition: Stable


Decision to Admit order: Yes





- Referrals


Referrals: 


Anjel Shaffer MD [Primary Care Provider] - 





- Patient Instructions





- Post Discharge Activity

## 2019-06-27 LAB
ALBUMIN SERPL-MCNC: 1.8 G/DL (ref 3.4–5)
ALBUMIN SERPL-MCNC: 1.8 G/DL (ref 3.4–5)
ALP SERPL-CCNC: 108 U/L (ref 45–117)
ALP SERPL-CCNC: 114 U/L (ref 45–117)
ALT SERPL-CCNC: 35 U/L (ref 13–61)
ALT SERPL-CCNC: 37 U/L (ref 13–61)
ANION GAP SERPL CALC-SCNC: 7 MMOL/L (ref 8–16)
ANION GAP SERPL CALC-SCNC: 8 MMOL/L (ref 8–16)
AST SERPL-CCNC: 87 U/L (ref 15–37)
AST SERPL-CCNC: 91 U/L (ref 15–37)
BASOPHILS # BLD: 0.2 % (ref 0–2)
BILIRUB SERPL-MCNC: 6.8 MG/DL (ref 0.2–1)
BILIRUB SERPL-MCNC: 7.4 MG/DL (ref 0.2–1)
BUN SERPL-MCNC: 4.4 MG/DL (ref 7–18)
BUN SERPL-MCNC: 4.9 MG/DL (ref 7–18)
CALCIUM SERPL-MCNC: 7.2 MG/DL (ref 8.5–10.1)
CALCIUM SERPL-MCNC: 7.3 MG/DL (ref 8.5–10.1)
CHLORIDE SERPL-SCNC: 106 MMOL/L (ref 98–107)
CHLORIDE SERPL-SCNC: 109 MMOL/L (ref 98–107)
CO2 SERPL-SCNC: 26 MMOL/L (ref 21–32)
CO2 SERPL-SCNC: 27 MMOL/L (ref 21–32)
CREAT SERPL-MCNC: 1 MG/DL (ref 0.55–1.3)
CREAT SERPL-MCNC: 1.3 MG/DL (ref 0.55–1.3)
DEPRECATED RDW RBC AUTO: 15.5 % (ref 11.6–15.6)
EOSINOPHIL # BLD: 0.4 % (ref 0–4.5)
GLUCOSE SERPL-MCNC: 110 MG/DL (ref 74–106)
GLUCOSE SERPL-MCNC: 62 MG/DL (ref 74–106)
HCT VFR BLD CALC: 26.3 % (ref 32.4–45.2)
HGB BLD-MCNC: 8.8 GM/DL (ref 10.7–15.3)
LYMPHOCYTES # BLD: 14.4 % (ref 8–40)
MCH RBC QN AUTO: 35.7 PG (ref 25.7–33.7)
MCHC RBC AUTO-ENTMCNC: 33.4 G/DL (ref 32–36)
MCV RBC: 106.9 FL (ref 80–96)
MONOCYTES # BLD AUTO: 8 % (ref 3.8–10.2)
NEUTROPHILS # BLD: 77 % (ref 42.8–82.8)
PLATELET # BLD AUTO: 173 K/MM3 (ref 134–434)
PMV BLD: 9.4 FL (ref 7.5–11.1)
POTASSIUM SERPLBLD-SCNC: 2.9 MMOL/L (ref 3.5–5.1)
POTASSIUM SERPLBLD-SCNC: 3.1 MMOL/L (ref 3.5–5.1)
PROT SERPL-MCNC: 5.2 G/DL (ref 6.4–8.2)
PROT SERPL-MCNC: 5.3 G/DL (ref 6.4–8.2)
RBC # BLD AUTO: 2.46 M/MM3 (ref 3.6–5.2)
SODIUM SERPL-SCNC: 140 MMOL/L (ref 136–145)
SODIUM SERPL-SCNC: 142 MMOL/L (ref 136–145)
WBC # BLD AUTO: 8.9 K/MM3 (ref 4–10)

## 2019-06-27 RX ADMIN — PIPERACILLIN SODIUM,TAZOBACTAM SODIUM SCH MLS/HR: 3; .375 INJECTION, POWDER, FOR SOLUTION INTRAVENOUS at 18:09

## 2019-06-27 RX ADMIN — POTASSIUM CHLORIDE, DEXTROSE MONOHYDRATE AND SODIUM CHLORIDE SCH MLS/HR: 150; 5; 450 INJECTION, SOLUTION INTRAVENOUS at 22:29

## 2019-06-27 RX ADMIN — POTASSIUM CHLORIDE SCH MLS/HR: 7.46 INJECTION, SOLUTION INTRAVENOUS at 23:54

## 2019-06-27 RX ADMIN — POTASSIUM CHLORIDE, DEXTROSE MONOHYDRATE AND SODIUM CHLORIDE SCH MLS/HR: 150; 5; 450 INJECTION, SOLUTION INTRAVENOUS at 16:58

## 2019-06-27 RX ADMIN — HEPARIN SODIUM SCH: 5000 INJECTION, SOLUTION INTRAVENOUS; SUBCUTANEOUS at 21:53

## 2019-06-27 NOTE — PN
Progress Note (short form)





- Note


Progress Note: 





patient seen this afternoon, still with abdominal pain despite morphine





abd: diffuse tenderness, no masses, no rebound





A> abdominal pain r/o acute cholecystitis


     Alcohol liver disease-- elevated T. bili, normal CBD


R>  continue antibiotics





     await surgical consultation


     





Problem List





- Problems


(1) Biliary colic


Code(s): K80.50 - CALCULUS OF BILE DUCT W/O CHOLANGITIS OR CHOLECYST W/O OBST   





(2) Cholecystitis


Code(s): K81.9 - CHOLECYSTITIS, UNSPECIFIED   





(3) Colitis


Code(s): K52.9 - NONINFECTIVE GASTROENTERITIS AND COLITIS, UNSPECIFIED

## 2019-06-27 NOTE — PN
Progress Note, Physician


History of Present Illness: 





Pt still having bloody diarrhea





- Current Medication List


Current Medications: 


Active Medications





Heparin Sodium (Porcine) (Heparin -)  5,000 unit SQ TID JOSE


   Last Admin: 06/27/19 21:53 Dose:  Not Given


Metronidazole (Flagyl 500mg Premixed Ivpb -)  500 mg in 100 mls @ 100 mls/hr 

IVPB Q8H-IV JOSE


   Last Admin: 06/27/19 16:59 Dose:  100 mls/hr


Piperacillin Sod/Tazobactam (Sod 3.375 gm/ Dextrose)  50 mls @ 100 mls/hr IVPB 

Q8H-IV JOSE; Protocol


   Last Admin: 06/27/19 18:09 Dose:  100 mls/hr


Potassium Chloride/Dextrose/Sod Cl (D5-1/2ns+20 Meq Kcl -)  20 meq in 1,000 mls 

@ 100 mls/hr IV ASDIR JOSE


   Last Admin: 06/27/19 16:58 Dose:  100 mls/hr


Morphine Sulfate (Morphine Sulfate)  4 mg IVPUSH Q4H PRN


   PRN Reason: PAIN LEVEL 6-10


   Last Admin: 06/27/19 18:32 Dose:  4 mg


Ondansetron HCl (Zofran Injection)  4 mg IVPUSH Q6H PRN


   PRN Reason: NAUSEA AND/OR VOMITING











- Objective


Vital Signs: 


 Vital Signs











Temperature  98.2 F   06/27/19 18:56


 


Pulse Rate  95 H  06/27/19 18:56


 


Respiratory Rate  20   06/27/19 18:56


 


Blood Pressure  98/60   06/27/19 18:56


 


O2 Sat by Pulse Oximetry (%)  98   06/27/19 06:51











Eyes: Yes: Sclera Icterus


Neck: Yes: WNL, Supple


Cardiovascular: Yes: WNL, Regular Rate and Rhythm


Respiratory: Yes: WNL, Regular, CTA Bilaterally, Other


Gastrointestinal: Yes: Normal Bowel Sounds, Other (Generalized tenderness (-) 

guarding/rebound)


Labs: 


 CBC, BMP





 06/27/19 05:00 





 06/27/19 19:40 





 INR, PTT











INR  1.89  (0.83-1.09)  H  06/26/19  12:34    














Problem List





- Problems


(1) Abdominal pain


Assessment/Plan: 


?Due to colitis vs hepatitis


Cont IV antribxs


GI/Surgery consults noted


Bili is slightly decreasing


Cont IVF 


Replace K+ and monitor


NPO





Code(s): R10.9 - UNSPECIFIED ABDOMINAL PAIN   





(2) Colitis


Assessment/Plan: 


Cont IV antibxs


Check stool studies


Code(s): K52.9 - NONINFECTIVE GASTROENTERITIS AND COLITIS, UNSPECIFIED   





(3) Hyperbilirubinemia


Code(s): E80.6 - OTHER DISORDERS OF BILIRUBIN METABOLISM   





(4) S/P gastric bypass


Code(s): Z98.84 - BARIATRIC SURGERY STATUS   





(5) Anemia


Assessment/Plan: 


?Dilutional


Monitor H/H





Code(s): D64.9 - ANEMIA, UNSPECIFIED

## 2019-06-27 NOTE — CON.GI
Consult


Consult Specialty:: GI


Referred by:: Dr Lemons


Reason for Consultation:: Colitis, Jaundice, elevated bili





- History of Present Illness


History of Present Illness: 





Patient is a 38 y/o female that presented with complaints of abdominal pain 

accompanied with [place]/vomiting and bloody diarrhea.  Patient states that on 

Sunday she developed epigastric pain accompanied with nausea and vomiting.  

Later in the day she developed bloody diarrhea with night awakening and chills.

  Denies recent travel or antibiotic use.  CT scan shows acute colitis with 

probable involvement of terminal ileum, small amount of RUQ mesenteric edema, 

small to moderate amount of lower pelvic free fluid, mild to moderate 

gallbladder overdistention, marked diffuse hepatic steatosis, splenomegaly.  

Labs in ER show elevated Bili 7.4 and Stool OB positive.





- History Source


History Provided By: Patient


Limitations to Obtaining History: No Limitations





- Past Medical History


Gastrointestinal: Yes: GERD





- Past Surgical History


Past Surgical History: Yes: Bariatric Surgery





- Alcohol/Substance Use


Hx Alcohol Use: No





- Smoking History


Smoking history: Never smoked





- Social History


ADL: Independent


History of Recent Travel: No





Home Medications





- Allergies


Allergies/Adverse Reactions: 


 Allergies











Allergy/AdvReac Type Severity Reaction Status Date / Time


 


No Known Allergies Allergy   Verified 06/26/19 11:36














- Home Medications


Home Medications: 


Ambulatory Orders





NK [No Known Home Medication]  06/26/19 











Family Disease History





- Family Disease History


Family Disease History: CA: Grandparent (both grandparents)





Review of Systems





- Review of Systems


Constitutional: reports: Weakness


Eyes: reports: No Symptoms


HENT: reports: No Symptoms


Neck: reports: No Symptoms


Cardiovascular: reports: No Symptoms


Respiratory: reports: No Symptoms


Gastrointestinal: reports: Abdominal Pain, Diarrhea, Dysphagia, Nausea, Rectal 

Bleeding


Genitourinary: reports: No Symptoms


Breasts: reports: No Symptoms Reported


Musculoskeletal: reports: No Symptoms


Integumentary: reports: No Symptoms


Neurological: reports: No Symptoms


Endocrine: reports: No Symptoms


Hematology/Lymphatic: reports: No Symptoms


Psychiatric: reports: No Symptoms





Physical Exam-GI


Vital Signs: 


 Vital Signs











Temperature  98.9 F   06/27/19 06:51


 


Pulse Rate  95 H  06/27/19 06:51


 


Respiratory Rate  18   06/27/19 06:51


 


Blood Pressure  101/61   06/27/19 06:51


 


O2 Sat by Pulse Oximetry (%)  98   06/27/19 06:51











Constitutional: Yes: No Distress, Calm


Eyes: Yes: Other (jaundice sclera)


HENT: Yes: Atraumatic


Cardiovascular: Yes: Regular Rate and Rhythm


Respiratory: Yes: Regular, CTA Bilaterally


Gastrointestinal Inspection: Yes: WNL.  No: Ascites, Distention, Hernia, Scars, 

Other


...Auscultate: Yes: Normoactive Bowel Sounds.  No: Hyperactive Bowel Sounds, 

Hypoactive Bowel Sounds, No Bowel Sounds, Other


...Palpate: Yes: Soft, Tenderness (diffuse).  No: Firm/Rigid, Guarding, 

Hepatomegaly, Mass, Pulsatile Mass, Splenomegaly, Tenderness, Epigastium, 

Tenderness, Rebound, Other


...Percussion: Yes: Tympanitic.  No: Dullness, Fluid Wave, Other


Neurological: Yes: Alert, Oriented


Psychiatric: Yes: Alert, Oriented


Labs: 


 CBC, BMP





 06/27/19 05:00 





 06/27/19 05:00 





 INR, PTT











INR  1.89  (0.83-1.09)  H  06/26/19  12:34    








Active Medications











Generic Name Dose Route Start Last Admin





  Trade Name Freq  PRN Reason Stop Dose Admin


 


Heparin Sodium (Porcine)  5,000 unit  06/27/19 10:00  





  Heparin -  SQ   





  BID JOSE   





     





     





     





     


 


Dextrose/Sodium Chloride  1,000 mls @ 75 mls/hr  06/27/19 02:30  06/27/19 03:21





  D5-1/2ns -  IV   75 mls/hr





  ASDIR JOSE   Administration





     





     





     





     


 


Metronidazole  500 mg in 100 mls @ 100 mls/hr  06/27/19 10:00  





  Flagyl 500mg Premixed Ivpb -  IVPB   





  Q8H-IV JOSE   





     





     





     





     


 


Piperacillin Sod/Tazobactam  50 mls @ 100 mls/hr  06/27/19 10:00  





  Sod 3.375 gm/ Dextrose  IVPB  06/27/19 18:29  





  Q8H-IV JOSE   





     





     





  Protocol   





     


 


Piperacillin Sod/Tazobactam  50 mls @ 100 mls/hr  06/28/19 02:00  





  Sod 3.375 gm/ Dextrose  IVPB   





  Q8H-IV JOSE   





     





     





  Protocol   





     


 


Morphine Sulfate  4 mg  06/27/19 02:25  06/27/19 03:20





  Morphine Sulfate  IVPUSH   4 mg





  Q6H PRN   Administration





  PAIN LEVEL 6-10   





     





     





     


 


Ondansetron HCl  4 mg  06/27/19 02:26  





  Zofran Injection  IVPUSH   





  Q6H PRN   





  NAUSEA AND/OR VOMITING   





     





     





     














Imaging





- Results


Cat Scan: Report Reviewed


Ultrasound: Report Reviewed





Problem List





- Problems


(1) Biliary colic


Code(s): K80.50 - CALCULUS OF BILE DUCT W/O CHOLANGITIS OR CHOLECYST W/O OBST   





(2) Cholecystitis


Code(s): K81.9 - CHOLECYSTITIS, UNSPECIFIED   





(3) Colitis


Code(s): K52.9 - NONINFECTIVE GASTROENTERITIS AND COLITIS, UNSPECIFIED

## 2019-06-27 NOTE — CONSULT
Consult


Consult Specialty:: General Surgery


Referred by:: PAVEL Lemons


Reason for Consultation:: colitis, jaundice, abd pain, bloody diarrhea





- History of Present Illness


Chief Complaint: abdominal pain, bloody diarrhea, N/V, "seeing orange," yellow 

eyes/skin


History of Present Illness: 





40yo F with h/o morbid obesity s/p lap gastric bypass  (Dr. Friedman, [facility] per old [employer] records), lost 213 lbs; GERD, endoscopic dilations for 

stricture after bypass; h/o heavy alcohol use and pancreatitis in past, 

currently drinks "on weekends," last on Saturday, vodka;  s/p  and 

tubal ligation in 2019; h/o appendectomy, tonsillectomy, also previous c/s; 

admitted to medicine through ER last night with diffuse abdominal pain 

beginning over weekend, associated with bloody diarrhea that started Saturday (

menses started on  after that), nausea and vomiting that occurs 

intermittently, yellow color to her eyes and skin for about the last week, some 

dizziness/weakness in the last few days, and an episode few days ago of visual 

loss lasting about 5 minutes during which she could see only "small balls 

moving around"/floaters, and since then has been seeing the color orange when 

she looks at anything. She also notes intermittent headaches (does not treat), 

chest pain (comes and goes on its own), SOB (feeling like she can't catch her 

breath or breathe deeply), on ROS. Diarrheal blood was bright red with wiping. 

Now is dark red, but still having bloody diarrhea in hospital. Fecal occult 

blood was positive in ER. She was noted to be jaundiced with abdominal pain and 

tenderness.





In the ER, labs were significant for wbc 10.6, bili 9.5 with mildly elevated 

AST and alk phos, BUN/Cr 4.3/1.2, K 3.3, Hb 11, + UA, INR 1.89, lactate 2.9. US 

showed no gallstones or ductal dilation. CT showed pancolitis, including 

terminal ileum, less prominent after splenic flexure but including descending 

and sigmoid with thickened bowel walls and some mesenteric edema without 

obstruction or diverticulitis. Chest CTA showed no PE and mild atelectasis. GI 

and Surgery were asked to assess.





She is seen and examined in bed. She c/o pain, and that the medicine is not 

lasting well. AM labs showed lactate to 3.2, BUN/Cr up slightly, Hb down to 8.8

, wbc down to normal, bili down to 7.4, and urine culture is growing GN 

organism. She has had fluids and Zosyn/Flagyl. She is NPO. She reports having 

been at [facility] for 4 days in April or May, during which she had 

upper endoscopy, but has never had a colonoscopy. She avoids NSAIDs because of 

the gastric bypass. She indicates that her menses are not particularly heavy, 

and she currently has her period. 





- History Source


History Provided By: Patient, Medical Record


Limitations to Obtaining History: No Limitations





- Past Medical History


Gastrointestinal: Yes: GERD, Other (h/o morbid obesity - lost 213 lbs after lap 

gastric bypass )


...LMP: 19 (started )


...Pregnant: No


...: 8


...Para: 9


Heme/Onc: Yes: Anemia


Psych: Yes: Other (heavy weekend EtOH drinker)





- Past Surgical History


Past Surgical History: Yes: Appendectomy, Bariatric Surgery (laparoscopic 

gastric bypass [facility] 10/13),  (x2, last ), Tonsillectomy, Tubal 

Ligation (done with last  ), Upper Endoscopy (last couple months 

ago at [facility] as inpatient, has had dilations).  No: Colonoscopy





- Alcohol/Substance Use


Hx Alcohol Use: Yes (drinks on weekends, last on Sat - shared 2 lg bottles 

vodka with 4 others)


History of Substance Use: reports: None





- Smoking History


Smoking history: Former smoker


Have you smoked in the past 12 months: No


If you are a former smoker, when did you quit?: none in about a year, was a 

social smoker only





- Social History


ADL: Independent


History of Recent Travel: No





Home Medications





- Allergies


Allergies/Adverse Reactions: 


 Allergies











Allergy/AdvReac Type Severity Reaction Status Date / Time


 


No Known Allergies Allergy   Verified 19 10:12














- Home Medications


Home Medications: 


Ambulatory Orders





Mv-Mn/Iron/FA/Herbal/Digestive [Prenatal One Tablet] 1 tab PO DAILY 19 








Home Medications (free text): pt avoids NSAIDs because of gastric bypass





Family Disease History





- Family Disease History


Family Disease History: Diabetes: Father (HTN, prostate CA dx 6m ago, he is 61)

, Mother (HTN, HLD), CA: Grandparent (both grandparents), Other: Father, Mother


Other Family History: mother's side with breast and colon CA; father's side 

with lupus





Review of Systems





- Review of Systems


Constitutional: reports: Chills, Weakness.  denies: Fever


Eyes: reports: Floaters, Recent Change in Vision, Other (few days ago - had 

episode when everything "went black" though eyes were open, for ~5 minutes, 

except "little balls moving around," and since then she has been seeing orange 

when looking at things)


HENT: denies: Difficult Swallowing, Throat Pain


Neck: denies: Swollen Glands, Tenderness


Cardiovascular: reports: Chest Pain.  denies: Palpitations


Respiratory: reports: SOB.  denies: Cough


Gastrointestinal: reports: Abdominal Pain (with hpi), Diarrhea (bloody since 

Saturday), Nausea, Rectal Bleeding, Vomiting.  denies: Constipation, Vomiting 

Blood


Genitourinary: denies: Burning, Dysuria


Musculoskeletal: denies: Back Pain, Joint Pain


Integumentary: reports: Change in Color (yellow to eyes and skin for about a 

week).  denies: Rash


Neurological: reports: Dizziness (last few days), Headache


Psychiatric: denies: Anxiety, Depression





Physical Exam


Vital Signs: 


 Vital Signs











Temperature  97.5 F L  19 13:21


 


Pulse Rate  93 H  19 13:21


 


Respiratory Rate  20   19 13:21


 


Blood Pressure  121/60   19 13:21


 


O2 Sat by Pulse Oximetry (%)  98   19 06:51











Constitutional: Yes: Well Nourished, Calm, Mild Distress (in pain)


Eyes: Yes: EOM Intact, Sclera Icterus


HENT: Yes: Atraumatic, Normocephalic


Neck: Yes: Supple, Trachea Midline


Cardiovascular: Yes: Tachycardia.  No: Pulse Irregular


Respiratory: Yes: Regular, CTA Bilaterally, Other (cannot take very deep breath 

?secondary to pain, feels like she can't catch her breath well).  No: On Nasal 

O2


Gastrointestinal: Yes: Normal Bowel Sounds, Soft, Tenderness (diffuse), 

Tenderness, Epigastrium, Other (well-healed Pfannenstiel, short RLQ, and 

laparoscopic scars).  No: Distention, Tenderness, Rebound


...Rectal Exam: Yes: Deferred


Renal/: Yes: CVA Tenderness - Left, CVA Tenderness - Right (right more than 

left), Menses Present (per pt - not excessively heavy per pt)


Musculoskeletal: No: Joint Stiffness, Joint Swelling


Extremities: No: Cool, Cyanosis


Edema: No


Peripheral Pulses WNL: Yes


Integumentary: Yes: Jaundice (mild - more visible in sclera, subtle in skin), 

Tattoos, Other (some redness to cheeks/face).  No: Rash


Neurological: Yes: Alert, Oriented


Psychiatric: Yes: Alert, Oriented


Labs: 


 CBC, BMP





 19 05:00 





 19 05:00 





 CMP











Sodium  140 mmol/L (136-145)   19  05:00    


 


Potassium  2.9 mmol/L (3.5-5.1)  L*  19  05:00    


 


Chloride  106 mmol/L ()   19  05:00    


 


Carbon Dioxide  26 mmol/L (21-32)   19  05:00    


 


Anion Gap  8 MMOL/L (8-16)   19  05:00    


 


BUN  4.9 mg/dL (7-18)  L  19  05:00    


 


Creatinine  1.3 mg/dL (0.55-1.3)   19  05:00    


 


Est GFR (CKD-EPI)AfAm  59.85   19  05:00    


 


Est GFR (CKD-EPI)NonAf  51.64   19  05:00    


 


Random Glucose  110 mg/dL ()  H  19  05:00    


 


Lactic Acid  3.2 mmol/L (0.4-2.0)  H*  19  06:00    


 


Calcium  7.2 mg/dL (8.5-10.1)  L  19  05:00    


 


Total Bilirubin  7.4 mg/dL (0.2-1)  H D 19  05:00    


 


AST  87 U/L (15-37)  H  19  05:00    


 


ALT  37 U/L (13-61)   19  05:00    


 


Alkaline Phosphatase  114 U/L ()   19  05:00    


 


Total Protein  5.3 g/dl (6.4-8.2)  L  19  05:00    


 


Albumin  1.8 g/dl (3.4-5.0)  L  19  05:00    


 


Lipase  60 U/L ()  L  19  12:36    








 INR, PTT











INR  1.89  (0.83-1.09)  H  19  12:34    








 Urine Test Results











Urine Color  Dk yellow   19  12:45    


 


Urine Appearance  Cloudy   19  12:45    


 


Urine pH  5.5  (5.0-8.0)   19  12:45    


 


Ur Specific Gravity  1.019  (1.010-1.035)   19  12:45    


 


Urine Protein  1+  (NEGATIVE)  H  19  12:45    


 


Urine Glucose (UA)  Negative  (NEGATIVE)   19  12:45    


 


Urine Ketones  Trace  (NEGATIVE)  H  19  12:45    


 


Urine Blood  3+  (NEGATIVE)  H  19  12:45    


 


Urine Nitrite  Positive  (NEGATIVE)  H  19  12:45    


 


Urine Bilirubin  3+  (NEGATIVE)  H  19  12:45    


 


Ur Leukocyte Esterase  2+  (NEGATIVE)  H  19  12:45    








 Microbiology











 19 12:45 Urine Culture - Preliminary





 Urine - Urine Clean Catch    Non Lactose Fermenting Gnb








bili down from 9.5 in ER, alk phos was 152


BUN/Cr was 4.3/1.2 - up a little


H/H dropped from 


wbc was 10.6


K down from 3.3


INR was normal in 2019





Imaging





- Results


Cat Scan: Report Reviewed (chest CTA also done - no PE, some basal discoid 

atelectasis), Image Reviewed (images reviewed - enlarged liver and spleen, 

gallbladder without stones, wall thickening throughout colon, more prominent 

from TI to splenic flexure, s/p gastric bypass, no free air, no obstruction)


Ultrasound: Report Reviewed (no gallstones, cbd 0.51 cm, mildly distended 

gallbladder with mild wall thickening, no pericholecystic fluid)





Problem List





- Problems


(1) Generalized abdominal pain


Code(s): R10.84 - GENERALIZED ABDOMINAL PAIN   





(2) Rectal bleeding


Code(s): K62.5 - HEMORRHAGE OF ANUS AND RECTUM   





(3) Colitis


Code(s): K52.9 - NONINFECTIVE GASTROENTERITIS AND COLITIS, UNSPECIFIED   





(4) Bloody diarrhea


Code(s): R19.7 - DIARRHEA, UNSPECIFIED   





(5) Hyperbilirubinemia


Code(s): E80.6 - OTHER DISORDERS OF BILIRUBIN METABOLISM   





(6) Alcoholic liver disorder


Code(s): K70.9 - ALCOHOLIC LIVER DISEASE, UNSPECIFIED   





(7) Microcytic hypochromic anemia


Code(s): D50.9 - IRON DEFICIENCY ANEMIA, UNSPECIFIED   





(8) S/P gastric bypass


Code(s): Z98.84 - BARIATRIC SURGERY STATUS   





Assessment/Plan





admitted to medicine


NPO/IVF


increase IV hydration - will give bolus now and add K+ to fluids


trend labs - add hep panel, tumor markers to tonight's labs


check stool studies, FOB was positive


will order MRCP with contrast - need to r/o occult malignancy given high 

bilirubin and anemia


alcohol likely contributing to liver abnormalities, but unclear how it would be 

related to colitis and bloody diarrhea


also, anemia changed from microcytic to macrocytic


INR elevated





?ischemic colitis related to dehydration?


pancolitis from IBD? infectious?


appreciate GI input - will need colonoscopy at some point


recommend obtaining records from [facility] inpatient stay few months ago and 

upper endoscopy done then


s/p gastric bypass at [facility] 2013


significant weight loss


h/o endoscopic dilations





no gallstones - not cholecystitis or choledocholithiasis


pt does have h/o pancreatitis in past - could have distal cbd stricture/

sclerosis?


will f/u MRI imaging





pain control prn - increase frequency of meds


repeat labs tonight and in am


also with UTI, f/u culture


antibiotics per ID





discussed with Dr. Lemons


no acute general surgical issues at this time


will follow up with you

## 2019-06-27 NOTE — CON.ID
Consult


Consult Specialty:: infectious diseases


Referred by:: 


Reason for Consultation:: bloody dirrhoea,uti,abd pain,colitis





- History of Present Illness


Chief Complaint: abd pain


History of Present Illness: 





38yo F with h/o morbid obesity s/p lap gastric bypass  ; GERD, endoscopic 

dilations for stricture after bypass; h/o heavy alcohol use and pancreatitis in 

past, currently drinks "on weekends," last on Saturday,   s/p  and 

tubal ligation in 2019; h/o appendectomy, tonsillectomy, also previous c/s; 

admitted to medicine through ER last night with diffuse abdominal pain 

beginning over weekend, associated with bloody diarrhea that started Saturday (

menses started on  after that), nausea and vomiting that occurs 

intermittently, , Diarrheal blood was bright red with wiping. Now is dark red, 

but still having bloody diarrhea in hospital. Fecal occult blood was positive 

in ER. She was noted to be jaundiced with abdominal pain and tenderness.


patient currently feels veryweak


also on work up patient was found to ahve uti and colitis on the ct scan








- History Source


History Provided By: Patient


Limitations to Obtaining History: No Limitations





- Past Medical History


Gastrointestinal: Yes: GERD


...LMP: 19


...Pregnant: No





- Past Surgical History


Past Surgical History: Yes: Bariatric Surgery





- Alcohol/Substance Use


Hx Alcohol Use: No





- Smoking History


Smoking history: Never smoked


Have you smoked in the past 12 months: No





- Social History


ADL: Independent


History of Recent Travel: No





Home Medications





- Allergies


Allergies/Adverse Reactions: 


 Allergies











Allergy/AdvReac Type Severity Reaction Status Date / Time


 


No Known Allergies Allergy   Verified 19 10:12














- Home Medications


Home Medications: 


Ambulatory Orders





Mv-Mn/Iron/FA/Herbal/Digestive [Prenatal One Tablet] 1 tab PO DAILY 19 











Family Disease History





- Family Disease History


Family Disease History: CA: Grandparent (both grandparents)





Review of Systems





- Review of Systems


Constitutional: reports: Weakness


Eyes: reports: No Symptoms


HENT: reports: No Symptoms


Neck: reports: No Symptoms


Cardiovascular: reports: No Symptoms


Respiratory: reports: SOB, SOB on Exertion


Gastrointestinal: reports: Abdominal Pain, Nausea, Rectal Bleeding


Genitourinary: reports: Dysuria


Musculoskeletal: reports: No Symptoms


Integumentary: reports: No Symptoms


Neurological: reports: No Symptoms


Endocrine: reports: No Symptoms


Hematology/Lymphatic: reports: No Symptoms


Psychiatric: reports: No Symptoms





Physical Exam


Vital Signs: 


 Vital Signs











Temperature  98.9 F   19 06:51


 


Pulse Rate  95 H  19 06:51


 


Respiratory Rate  18   19 06:51


 


Blood Pressure  101/61   19 06:51


 


O2 Sat by Pulse Oximetry (%)  98   19 06:51











Constitutional: Yes: Well Nourished, Mild Distress


Eyes: Yes: Conjunctiva Clear


HENT: Yes: Atraumatic, Normocephalic


Neck: Yes: Supple, Trachea Midline


Cardiovascular: Yes: Regular Rate and Rhythm


Respiratory: Yes: Regular, CTA Bilaterally


Gastrointestinal: Yes: Normal Bowel Sounds, Soft, Tenderness


...Rectal Exam: Yes: Guaiac Positive


Renal/: No: CVA Tenderness - Left, CVA Tenderness - Right


Musculoskeletal: Yes: WNL


Extremities: Yes: WNL


Neurological: Yes: Alert, Oriented


Psychiatric: Yes: Alert, Oriented


Labs: 


 CBC, BMP





 19 05:00 





 19 05:00 











Imaging





- Results


Chest X-ray: Report Reviewed, Image Reviewed


Cat Scan: Report Reviewed, Image Reviewed





Assessment/Plan





Problem List





- Problems


(1) Generalized abdominal pain


Code(s): R10.84 - GENERALIZED ABDOMINAL PAIN   





(2) Rectal bleeding


Code(s): K62.5 - HEMORRHAGE OF ANUS AND RECTUM   





(3) Colitis


Code(s): K52.9 - NONINFECTIVE GASTROENTERITIS AND COLITIS, UNSPECIFIED   





(4) Bloody diarrhea


Code(s): R19.7 - DIARRHEA, UNSPECIFIED   





(5) Hyperbilirubinemia


Code(s): E80.6 - OTHER DISORDERS OF BILIRUBIN METABOLISM   





(6) Alcoholic liver disorder


Code(s): K70.9 - ALCOHOLIC LIVER DISEASE, UNSPECIFIED   





(7) Microcytic hypochromic anemia


Code(s): D50.9 - IRON DEFICIENCY ANEMIA, UNSPECIFIED   





(8) S/P gastric bypass


Code(s): Z98.84 - BARIATRIC SURGERY STATUS   








patients colitis is quite disffuse


r/o infectious vs ischemic


also uti


patients bilirubin high,cause   


lfts not bad though





plan


will start on abx


hydration


surgery


gi 


monitor for blled


follow labs

## 2019-06-28 LAB
ALBUMIN SERPL-MCNC: 1.7 G/DL (ref 3.4–5)
ALP SERPL-CCNC: 102 U/L (ref 45–117)
ALT SERPL-CCNC: 35 U/L (ref 13–61)
ANION GAP SERPL CALC-SCNC: 6 MMOL/L (ref 8–16)
APTT BLD: 46.6 SECONDS (ref 25.2–36.5)
AST SERPL-CCNC: 92 U/L (ref 15–37)
BASOPHILS # BLD: 0.8 % (ref 0–2)
BILIRUB CONJ SERPL-MCNC: 5.1 MG/DL (ref 0–0.2)
BILIRUB SERPL-MCNC: 6.7 MG/DL (ref 0.2–1)
BUN SERPL-MCNC: 3.7 MG/DL (ref 7–18)
CALCIUM SERPL-MCNC: 7.2 MG/DL (ref 8.5–10.1)
CHLORIDE SERPL-SCNC: 110 MMOL/L (ref 98–107)
CO2 SERPL-SCNC: 26 MMOL/L (ref 21–32)
CREAT SERPL-MCNC: 0.8 MG/DL (ref 0.55–1.3)
DEPRECATED RDW RBC AUTO: 15.1 % (ref 11.6–15.6)
EOSINOPHIL # BLD: 0.6 % (ref 0–4.5)
GLUCOSE SERPL-MCNC: 64 MG/DL (ref 74–106)
HCT VFR BLD CALC: 24.2 % (ref 32.4–45.2)
HGB BLD-MCNC: 8 GM/DL (ref 10.7–15.3)
INR BLD: 2.19 (ref 0.83–1.09)
LYMPHOCYTES # BLD: 14.6 % (ref 8–40)
MCH RBC QN AUTO: 35.3 PG (ref 25.7–33.7)
MCHC RBC AUTO-ENTMCNC: 33.1 G/DL (ref 32–36)
MCV RBC: 106.5 FL (ref 80–96)
MONOCYTES # BLD AUTO: 7.7 % (ref 3.8–10.2)
NEUTROPHILS # BLD: 76.3 % (ref 42.8–82.8)
PLATELET # BLD AUTO: 148 K/MM3 (ref 134–434)
PMV BLD: 9.3 FL (ref 7.5–11.1)
POTASSIUM SERPLBLD-SCNC: 3.4 MMOL/L (ref 3.5–5.1)
PROT SERPL-MCNC: 5 G/DL (ref 6.4–8.2)
PT PNL PPP: 26 SEC (ref 9.7–13)
RBC # BLD AUTO: 2.27 M/MM3 (ref 3.6–5.2)
SODIUM SERPL-SCNC: 141 MMOL/L (ref 136–145)
WBC # BLD AUTO: 6.4 K/MM3 (ref 4–10)

## 2019-06-28 RX ADMIN — POTASSIUM CHLORIDE, DEXTROSE MONOHYDRATE AND SODIUM CHLORIDE SCH MLS/HR: 150; 5; 450 INJECTION, SOLUTION INTRAVENOUS at 20:33

## 2019-06-28 RX ADMIN — PIPERACILLIN SODIUM,TAZOBACTAM SODIUM SCH MLS/HR: 3; .375 INJECTION, POWDER, FOR SOLUTION INTRAVENOUS at 09:58

## 2019-06-28 RX ADMIN — POTASSIUM CHLORIDE SCH MLS/HR: 7.46 INJECTION, SOLUTION INTRAVENOUS at 01:28

## 2019-06-28 RX ADMIN — HEPARIN SODIUM SCH: 5000 INJECTION, SOLUTION INTRAVENOUS; SUBCUTANEOUS at 22:13

## 2019-06-28 RX ADMIN — POTASSIUM CHLORIDE SCH MLS/HR: 7.46 INJECTION, SOLUTION INTRAVENOUS at 20:32

## 2019-06-28 RX ADMIN — POTASSIUM CHLORIDE SCH MLS/HR: 7.46 INJECTION, SOLUTION INTRAVENOUS at 17:08

## 2019-06-28 RX ADMIN — POTASSIUM CHLORIDE SCH MLS/HR: 7.46 INJECTION, SOLUTION INTRAVENOUS at 13:38

## 2019-06-28 RX ADMIN — PIPERACILLIN SODIUM,TAZOBACTAM SODIUM SCH MLS/HR: 3; .375 INJECTION, POWDER, FOR SOLUTION INTRAVENOUS at 02:28

## 2019-06-28 RX ADMIN — PIPERACILLIN SODIUM,TAZOBACTAM SODIUM SCH MLS/HR: 3; .375 INJECTION, POWDER, FOR SOLUTION INTRAVENOUS at 17:14

## 2019-06-28 RX ADMIN — PANTOPRAZOLE SODIUM SCH MG: 40 INJECTION, POWDER, FOR SOLUTION INTRAVENOUS at 22:14

## 2019-06-28 RX ADMIN — HEPARIN SODIUM SCH UNIT: 5000 INJECTION, SOLUTION INTRAVENOUS; SUBCUTANEOUS at 13:38

## 2019-06-28 RX ADMIN — HEPARIN SODIUM SCH: 5000 INJECTION, SOLUTION INTRAVENOUS; SUBCUTANEOUS at 05:27

## 2019-06-28 NOTE — PN
Progress Note, Physician


History of Present Illness: 





No new complaints





- Current Medication List


Current Medications: 


Active Medications





Heparin Sodium (Porcine) (Heparin -)  5,000 unit SQ TID JOSE


   Last Admin: 06/28/19 13:38 Dose:  5,000 unit


Metronidazole (Flagyl 500mg Premixed Ivpb -)  500 mg in 100 mls @ 100 mls/hr 

IVPB Q8H-IV JOSE


   Last Admin: 06/28/19 17:09 Dose:  100 mls/hr


Piperacillin Sod/Tazobactam (Sod 3.375 gm/ Dextrose)  50 mls @ 100 mls/hr IVPB 

Q8H-IV JOSE; Protocol


   Last Admin: 06/28/19 17:14 Dose:  100 mls/hr


Potassium Chloride/Dextrose/Sod Cl (D5-1/2ns+20 Meq Kcl -)  20 meq in 1,000 mls 

@ 100 mls/hr IV ASDIR JOSE


   Last Admin: 06/28/19 20:33 Dose:  100 mls/hr


Morphine Sulfate (Morphine Sulfate)  4 mg IVPUSH Q3H PRN


   PRN Reason: PAIN LEVEL 6-10


   Last Admin: 06/28/19 20:27 Dose:  4 mg


Ondansetron HCl (Zofran Injection)  4 mg IVPUSH Q6H PRN


   PRN Reason: NAUSEA AND/OR VOMITING


Pantoprazole Sodium (Protonix Iv)  40 mg IVPUSH BID JOSE











- Objective


Vital Signs: 


 Vital Signs











Temperature  97.4 F L  06/28/19 18:25


 


Pulse Rate  92 H  06/28/19 18:25


 


Respiratory Rate  18   06/28/19 18:25


 


Blood Pressure  116/67   06/28/19 18:25


 


O2 Sat by Pulse Oximetry (%)  99   06/28/19 09:00











Neck: Yes: WNL, Supple


Cardiovascular: Yes: WNL, Regular Rate and Rhythm


Respiratory: Yes: WNL, Regular, CTA Bilaterally


Gastrointestinal: Yes: Normal Bowel Sounds, Soft, Other (Tenderness on palpation

)


Labs: 


 CBC, BMP





 06/28/19 06:20 





 06/28/19 06:20 





 INR, PTT











INR  2.19  (0.83-1.09)  H  06/28/19  06:20    














Problem List





- Problems


(1) Abdominal pain


Code(s): R10.9 - UNSPECIFIED ABDOMINAL PAIN   





(2) Colitis


Code(s): K52.9 - NONINFECTIVE GASTROENTERITIS AND COLITIS, UNSPECIFIED   





(3) Hyperbilirubinemia


Code(s): E80.6 - OTHER DISORDERS OF BILIRUBIN METABOLISM   





(4) S/P gastric bypass


Code(s): Z98.84 - BARIATRIC SURGERY STATUS   





(5) Anemia


Code(s): D64.9 - ANEMIA, UNSPECIFIED

## 2019-06-28 NOTE — PN
Progress Note, Physician


History of Present Illness: 





GI FOLLOW UP NOTE


Patient examined and case discussed with Dr Alicia





Patient continue to complain of diffuse abdominal pain.  States having 3 

episodes of bloody diarrhea and rectal bleeding yesterday.  MRCP done and 

official results pending.  AM labs pending including tumor markers and 

hepatitis panel.  





- Current Medication List


Current Medications: 


Active Medications





Heparin Sodium (Porcine) (Heparin -)  5,000 unit SQ TID JOSE


   Last Admin: 06/28/19 05:27 Dose:  Not Given


Metronidazole (Flagyl 500mg Premixed Ivpb -)  500 mg in 100 mls @ 100 mls/hr 

IVPB Q8H-IV JOSE


   Last Admin: 06/28/19 02:55 Dose:  100 mls/hr


Piperacillin Sod/Tazobactam (Sod 3.375 gm/ Dextrose)  50 mls @ 100 mls/hr IVPB 

Q8H-IV JOSE; Protocol


   Last Admin: 06/28/19 02:28 Dose:  100 mls/hr


Potassium Chloride/Dextrose/Sod Cl (D5-1/2ns+20 Meq Kcl -)  20 meq in 1,000 mls 

@ 100 mls/hr IV ASDIR JOSE


   Last Admin: 06/27/19 22:29 Dose:  100 mls/hr


Morphine Sulfate (Morphine Sulfate)  4 mg IVPUSH Q4H PRN


   PRN Reason: PAIN LEVEL 6-10


   Last Admin: 06/28/19 06:07 Dose:  4 mg


Ondansetron HCl (Zofran Injection)  4 mg IVPUSH Q6H PRN


   PRN Reason: NAUSEA AND/OR VOMITING











- Objective


Vital Signs: 


 Vital Signs











Temperature  98.2 F   06/28/19 05:02


 


Pulse Rate  92 H  06/28/19 05:02


 


Respiratory Rate  20   06/28/19 05:02


 


Blood Pressure  109/69   06/28/19 05:02


 


O2 Sat by Pulse Oximetry (%)  99   06/27/19 21:00











Constitutional: Yes: No Distress, Anxious


Eyes: Yes: Conjunctiva Clear


HENT: Yes: Atraumatic


Cardiovascular: Yes: Regular Rate and Rhythm


Respiratory: Yes: Regular, CTA Bilaterally


Gastrointestinal: Yes: Normal Bowel Sounds, Soft, Tenderness (diffuse)


Neurological: Yes: Alert, Oriented


Psychiatric: Yes: Alert, Oriented


Labs: 


 CBC, BMP





 06/27/19 05:00 





 06/27/19 19:40 





 INR, PTT











INR  1.89  (0.83-1.09)  H  06/26/19  12:34    








 Microbiology





06/26/19 12:45   Urine - Urine Clean Catch   Urine Culture - Preliminary


                            Non Lactose Fermenting Gnb











Problem List





- Problems


(1) Abdominal pain


Assessment/Plan: 


-NPO 


-IV hydration


-Zosyn and Flagyl


-MRCP results pending


-Surgical recommendations appreciated


-tumor markers and Hepatitis panel pending


Code(s): R10.9 - UNSPECIFIED ABDOMINAL PAIN   





(2) Anemia


Assessment/Plan: 


-monitor Hg daily


-yesterday Hg 8.8


-transfuse for Hg <8.0


-Stool OB positive


Code(s): D64.9 - ANEMIA, UNSPECIFIED   





(3) Bloody diarrhea


Assessment/Plan: 


-Stool OB positive


-monitor Hg daily


-transfuse for Hg <8.0


Code(s): R19.7 - DIARRHEA, UNSPECIFIED   





(4) Hyperbilirubinemia


Assessment/Plan: 


-Bili 6.8


-pending Hepatitis panel 


Code(s): E80.6 - OTHER DISORDERS OF BILIRUBIN METABOLISM

## 2019-06-28 NOTE — PN
Progress Note, Physician


History of Present Illness: 





starting to feel hungry


still c/o of abd pain


bili on repeat had slightly decreased


patient got mrcp done





- Current Medication List


Current Medications: 


Active Medications





Heparin Sodium (Porcine) (Heparin -)  5,000 unit SQ TID JOSE


   Last Admin: 06/28/19 05:27 Dose:  Not Given


Metronidazole (Flagyl 500mg Premixed Ivpb -)  500 mg in 100 mls @ 100 mls/hr 

IVPB Q8H-IV JOSE


   Last Admin: 06/28/19 02:55 Dose:  100 mls/hr


Piperacillin Sod/Tazobactam (Sod 3.375 gm/ Dextrose)  50 mls @ 100 mls/hr IVPB 

Q8H-IV JOSE; Protocol


   Last Admin: 06/28/19 02:28 Dose:  100 mls/hr


Potassium Chloride/Dextrose/Sod Cl (D5-1/2ns+20 Meq Kcl -)  20 meq in 1,000 mls 

@ 100 mls/hr IV ASDIR JOSE


   Last Admin: 06/27/19 22:29 Dose:  100 mls/hr


Morphine Sulfate (Morphine Sulfate)  4 mg IVPUSH Q4H PRN


   PRN Reason: PAIN LEVEL 6-10


   Last Admin: 06/28/19 06:07 Dose:  4 mg


Ondansetron HCl (Zofran Injection)  4 mg IVPUSH Q6H PRN


   PRN Reason: NAUSEA AND/OR VOMITING











- Objective


Vital Signs: 


 Vital Signs











Temperature  98.2 F   06/28/19 05:02


 


Pulse Rate  92 H  06/28/19 05:02


 


Respiratory Rate  20   06/28/19 05:02


 


Blood Pressure  109/69   06/28/19 05:02


 


O2 Sat by Pulse Oximetry (%)  99   06/27/19 21:00











Constitutional: Yes: Calm, Mild Distress


Cardiovascular: Yes: Regular Rate and Rhythm


Respiratory: Yes: Regular, CTA Bilaterally


Gastrointestinal: Yes: Normal Bowel Sounds, Soft


Musculoskeletal: Yes: WNL


Extremities: Yes: WNL


Neurological: Yes: Alert, Oriented


Psychiatric: Yes: Alert, Oriented


Labs: 


 CBC, BMP





 06/28/19 06:20 





 INR, PTT











INR  2.19  (0.83-1.09)  H  06/28/19  06:20    














Assessment/Plan





Problem List





- Problems


(1) Generalized abdominal pain


Code(s): R10.84 - GENERALIZED ABDOMINAL PAIN   





(2) Rectal bleeding


Code(s): K62.5 - HEMORRHAGE OF ANUS AND RECTUM   





(3) Colitis


Code(s): K52.9 - NONINFECTIVE GASTROENTERITIS AND COLITIS, UNSPECIFIED   





(4) Bloody diarrhea


Code(s): R19.7 - DIARRHEA, UNSPECIFIED   





(5) Hyperbilirubinemia


Code(s): E80.6 - OTHER DISORDERS OF BILIRUBIN METABOLISM   





(6) Alcoholic liver disorder


Code(s): K70.9 - ALCOHOLIC LIVER DISEASE, UNSPECIFIED   





(7) Microcytic hypochromic anemia


Code(s): D50.9 - IRON DEFICIENCY ANEMIA, UNSPECIFIED   





(8) S/P gastric bypass


Code(s): Z98.84 - BARIATRIC SURGERY STATUS   








patients colitis is quite disffuse


r/o infectious vs ischemic


also uti


patients bilirubin high,cause   


lfts not bad though





plan


continue abx


await todays labs


hydration


rest as per the team


await for mrcp results


await for identification of the bacteria

## 2019-06-28 NOTE — PN
Progress Note (short form)





- Note


Progress Note: 





Patient seen and examined 


39 year old presents with diffuse abdominal pain, nausea, emesis, bloody 

diarrhea, hematest positive stool. 


CT compatible with colitis ,hepatosplenomegaly with portal hypertension and 

splenorenal varices.


Abnormal LFT's, with elevation of bilirubin and elevation of INR. 


On antibiotics, NPO. 


E. coli UTI. 


Has had gastric bypass 9 years earlier, and has had transfusion therapy in past 

but denies iron or B-12 therapy. 





Anemia  likely multifactorial


 


Hematest positive stool- component of blood loss


Acute and chronic disease (colitis, UTI) 


H/o week-end binge drinking                                                    

                                                                               

                                                                               

                                                                               

                                                                               

                                                                               

                                                                               

                                                            ? additional 

component of increased sequestrtion  with hypersplenism, portal hypertension 


s/p gastric bypass with possible B-12 deficiency and /or Fe++ deficiency  





Elevation of INR - NPO, Liver disease, and antibiotic therapy








Plan 


Vitamin K - low dose to help correct INR


Check B-12  Fe++, and folate levels 


After levels obtained , begin treatment with B-12, and  Venofer.

## 2019-06-29 LAB
ALBUMIN SERPL-MCNC: 1.8 G/DL (ref 3.4–5)
ALP SERPL-CCNC: 102 U/L (ref 45–117)
ALT SERPL-CCNC: 38 U/L (ref 13–61)
ANION GAP SERPL CALC-SCNC: 6 MMOL/L (ref 8–16)
APTT BLD: 45.8 SECONDS (ref 25.2–36.5)
AST SERPL-CCNC: 105 U/L (ref 15–37)
BASOPHILS # BLD: 0.6 % (ref 0–2)
BILIRUB DIRECT SERPL-MCNC: 136 U/L (ref 84–246)
BILIRUB SERPL-MCNC: 7 MG/DL (ref 0.2–1)
BUN SERPL-MCNC: 2.4 MG/DL (ref 7–18)
CALCIUM SERPL-MCNC: 7.4 MG/DL (ref 8.5–10.1)
CEA SERPL-MCNC: 5.5 NG/ML (ref 0–4.7)
CHLORIDE SERPL-SCNC: 109 MMOL/L (ref 98–107)
CO2 SERPL-SCNC: 27 MMOL/L (ref 21–32)
CREAT SERPL-MCNC: 0.7 MG/DL (ref 0.55–1.3)
DEPRECATED RDW RBC AUTO: 14.9 % (ref 11.6–15.6)
EOSINOPHIL # BLD: 0.6 % (ref 0–4.5)
GLUCOSE SERPL-MCNC: 78 MG/DL (ref 74–106)
HCT VFR BLD CALC: 25.5 % (ref 32.4–45.2)
HGB BLD-MCNC: 8.5 GM/DL (ref 10.7–15.3)
INR BLD: 2.2 (ref 0.83–1.09)
LYMPHOCYTES # BLD: 15 % (ref 8–40)
MAGNESIUM SERPL-MCNC: 1.9 MG/DL (ref 1.8–2.4)
MCH RBC QN AUTO: 35.9 PG (ref 25.7–33.7)
MCHC RBC AUTO-ENTMCNC: 33.4 G/DL (ref 32–36)
MCV RBC: 107.6 FL (ref 80–96)
MONOCYTES # BLD AUTO: 7.1 % (ref 3.8–10.2)
NEUTROPHILS # BLD: 76.7 % (ref 42.8–82.8)
PHOSPHATE SERPL-MCNC: 2 MG/DL (ref 2.5–4.9)
PLATELET # BLD AUTO: 150 K/MM3 (ref 134–434)
PMV BLD: 9 FL (ref 7.5–11.1)
POTASSIUM SERPLBLD-SCNC: 3.6 MMOL/L (ref 3.5–5.1)
PROT SERPL-MCNC: 5.3 G/DL (ref 6.4–8.2)
PT PNL PPP: 26.2 SEC (ref 9.7–13)
RBC # BLD AUTO: 2.37 M/MM3 (ref 3.6–5.2)
RETICS # AUTO: 2.91 % (ref 0.5–1.5)
SODIUM SERPL-SCNC: 142 MMOL/L (ref 136–145)
WBC # BLD AUTO: 5.9 K/MM3 (ref 4–10)

## 2019-06-29 RX ADMIN — PIPERACILLIN SODIUM,TAZOBACTAM SODIUM SCH MLS/HR: 3; .375 INJECTION, POWDER, FOR SOLUTION INTRAVENOUS at 10:29

## 2019-06-29 RX ADMIN — POTASSIUM CHLORIDE, DEXTROSE MONOHYDRATE AND SODIUM CHLORIDE SCH: 150; 5; 450 INJECTION, SOLUTION INTRAVENOUS at 17:18

## 2019-06-29 RX ADMIN — FOLIC ACID SCH MG: 1 TABLET ORAL at 17:21

## 2019-06-29 RX ADMIN — PIPERACILLIN SODIUM,TAZOBACTAM SODIUM SCH MLS/HR: 3; .375 INJECTION, POWDER, FOR SOLUTION INTRAVENOUS at 17:17

## 2019-06-29 RX ADMIN — PANTOPRAZOLE SODIUM SCH MG: 40 INJECTION, POWDER, FOR SOLUTION INTRAVENOUS at 21:08

## 2019-06-29 RX ADMIN — HEPARIN SODIUM SCH: 5000 INJECTION, SOLUTION INTRAVENOUS; SUBCUTANEOUS at 15:35

## 2019-06-29 RX ADMIN — HEPARIN SODIUM SCH UNIT: 5000 INJECTION, SOLUTION INTRAVENOUS; SUBCUTANEOUS at 21:09

## 2019-06-29 RX ADMIN — PANTOPRAZOLE SODIUM SCH MG: 40 INJECTION, POWDER, FOR SOLUTION INTRAVENOUS at 10:32

## 2019-06-29 RX ADMIN — HEPARIN SODIUM SCH: 5000 INJECTION, SOLUTION INTRAVENOUS; SUBCUTANEOUS at 05:41

## 2019-06-29 RX ADMIN — PIPERACILLIN SODIUM,TAZOBACTAM SODIUM SCH MLS/HR: 3; .375 INJECTION, POWDER, FOR SOLUTION INTRAVENOUS at 02:22

## 2019-06-29 NOTE — PN
Progress Note, Physician


History of Present Illness: 


Patient with pancolitis, possibly low-flow state vs infectious or IBD? Also 

with hyperbilirubinemia, likely secondary to alcoholic liver disease. MRCP done 

- hepatomegaly and splenomegaly with probable splenorenal shunting LUQ and 

early portal hypertension. No gallstones, no ductal dilation or stones. 

Pancreas unremarkable with no ductal dilation. She is NPO on fluids with Zosyn/

Flagyl for the colitis and UTI (E. coli). She is seen and examined in bed. She 

reports pain is about the same. Last BM was watery with few small brown balls, 

no black, no blood. INR and PTT still elevated. Bili 7 today. Pt c/o headache, 

low energy, did walk a little bit. Would like something to help sleep. 





- Current Medication List


Current Medications: 


Active Medications





Heparin Sodium (Porcine) (Heparin -)  5,000 unit SQ TID [facility]


   Last Admin: 06/29/19 05:41 Dose:  Not Given


Metronidazole (Flagyl 500mg Premixed Ivpb -)  500 mg in 100 mls @ 100 mls/hr 

IVPB Q8H-IV JOSE


   Last Admin: 06/29/19 10:31 Dose:  100 mls/hr


Piperacillin Sod/Tazobactam (Sod 3.375 gm/ Dextrose)  50 mls @ 100 mls/hr IVPB 

Q8H-IV JOSE; Protocol


   Last Admin: 06/29/19 10:29 Dose:  100 mls/hr


Potassium Chloride/Dextrose/Sod Cl (D5-1/2ns+20 Meq Kcl -)  20 meq in 1,000 mls 

@ 100 mls/hr IV ASDIR JOSE


   Last Admin: 06/28/19 20:33 Dose:  100 mls/hr


Morphine Sulfate (Morphine Sulfate)  4 mg IVPUSH Q3H PRN


   PRN Reason: PAIN LEVEL 6-10


   Last Admin: 06/29/19 12:05 Dose:  4 mg


Ondansetron HCl (Zofran Injection)  4 mg IVPUSH Q6H PRN


   PRN Reason: NAUSEA AND/OR VOMITING


Pantoprazole Sodium (Protonix Iv)  40 mg IVPUSH BID JOSE


   Last Admin: 06/29/19 10:32 Dose:  40 mg











- Objective


Vital Signs: 


 Vital Signs











Temperature  98.1 F   06/29/19 06:14


 


Pulse Rate  83   06/29/19 06:14


 


Respiratory Rate  18   06/29/19 06:14


 


Blood Pressure  96/45 L  06/29/19 06:14


 


O2 Sat by Pulse Oximetry (%)  100   06/28/19 21:00











Constitutional: Yes: Well Nourished, No Distress, Calm


Eyes: Yes: EOM Intact, Sclera Icterus


HENT: Yes: Atraumatic, Normocephalic


Gastrointestinal: Yes: Soft, Distention (mild), Tenderness (diffuse, slightly 

less in lower aspects), Tenderness, Epigastrium.  No: Tenderness, Rebound


Extremities: No: Cool, Cyanosis


Integumentary: Yes: Jaundice (mild), Tattoos.  No: Rash


Neurological: Yes: Alert, Oriented


Labs: 


 CBC, BMP





 06/29/19 05:59 





 06/29/19 05:59 





 INR, PTT











INR  2.20  (0.83-1.09)  H  06/29/19  05:59    





PTT 45.8


 CMP











Sodium  142 mmol/L (136-145)   06/29/19  05:59    


 


Potassium  3.6 mmol/L (3.5-5.1)   06/29/19  05:59    


 


Chloride  109 mmol/L ()  H  06/29/19  05:59    


 


Carbon Dioxide  27 mmol/L (21-32)   06/29/19  05:59    


 


Anion Gap  6 MMOL/L (8-16)  L  06/29/19  05:59    


 


BUN  2.4 mg/dL (7-18)  L*  06/29/19  05:59    


 


Creatinine  0.7 mg/dL (0.55-1.3)   06/29/19  05:59    


 


Est GFR (CKD-EPI)AfAm  126.49   06/29/19  05:59    


 


Est GFR (CKD-EPI)NonAf  109.14   06/29/19  05:59    


 


Random Glucose  78 mg/dL ()   06/29/19  05:59    


 


Lactic Acid  1.7 mmol/L (0.4-2.0)   06/27/19  19:40    


 


Calcium  7.4 mg/dL (8.5-10.1)  L  06/29/19  05:59    


 


Phosphorus  2.0 mg/dL (2.5-4.9)  L  06/29/19  05:59    


 


Magnesium  1.9 mg/dL (1.8-2.4)   06/29/19  05:59    


 


Ferritin  226.4 ng/ml (8-388)   06/29/19  05:59    


 


Total Bilirubin  7.0 mg/dL (0.2-1)  H  06/29/19  05:59    


 


Direct Bilirubin  5.1 mg/dL (0.0-0.2)  H  06/28/19  06:20    


 


AST  105 U/L (15-37)  H  06/29/19  05:59    


 


ALT  38 U/L (13-61)   06/29/19  05:59    


 


Alkaline Phosphatase  102 U/L ()   06/29/19  05:59    


 


LD Total  136 U/L ()   06/29/19  05:59    


 


Total Protein  5.3 g/dl (6.4-8.2)  L  06/29/19  05:59    


 


Albumin  1.8 g/dl (3.4-5.0)  L  06/29/19  05:59    


 


Lipase  60 U/L ()  L  06/26/19  12:36    


 


Tumor Marker AFP  1.8 ng/ml (0.0-8.3)   06/27/19  19:40    


 


Carcinoembryonic Ag  5.5 ng/mL (0.0-4.7)  H  06/27/19  19:40    


 


CA 19-9 Antigen  54 U/mL (0-35)  H  06/27/19  19:40    


 


Vitamin B12  3787 pg/ml (193-986)  H  06/29/19  05:59    


 


Serum Folate  3 ng/mL (3.1-17.5)  L  06/29/19  05:59    








CEA, Ca19-9 mildly elevated - could be secondary to acute inflammation


K, Phos little low


AST still up a bit


BUN/Cr normal


Hb stable





Problem List





- Problems


(1) Generalized abdominal pain


Code(s): R10.84 - GENERALIZED ABDOMINAL PAIN   





(2) Rectal bleeding


Code(s): K62.5 - HEMORRHAGE OF ANUS AND RECTUM   





(3) Colitis


Code(s): K52.9 - NONINFECTIVE GASTROENTERITIS AND COLITIS, UNSPECIFIED   





(4) Bloody diarrhea


Code(s): R19.7 - DIARRHEA, UNSPECIFIED   





(5) Hyperbilirubinemia


Code(s): E80.6 - OTHER DISORDERS OF BILIRUBIN METABOLISM   





(6) Alcoholic liver disorder


Code(s): K70.9 - ALCOHOLIC LIVER DISEASE, UNSPECIFIED   





(7) Microcytic hypochromic anemia


Code(s): D50.9 - IRON DEFICIENCY ANEMIA, UNSPECIFIED   





(8) S/P gastric bypass


Code(s): Z98.84 - BARIATRIC SURGERY STATUS   





Assessment/Plan





admitted to medicine


continue NPO/IVF until pain and tenderness resolve


maintain good hydration - rechecking VS/BP now - 110/90





bloody diarrhea resolving, tenderness slightly less but still diffusely present


replete lytes


trend labs


INR and PTT elevated, hematology consult noted





pain control prn


encourage OOB, ambulation as able


also with UTI, cx with E. coli


antibiotics per ID, discussed with Dr. Wiley on floor


stool studies pending - yeast in one culture





no gallstones - not cholecystitis or choledocholithiasis


pt does have h/o pancreatitis in past 


MRI without evidence of pancreatic or biliary pathology


?ischemic colitis related to low-flow state?


pancolitis from IBD? infectious?


GI input noted - will need colonoscopy at some point after recovery





recommend obtaining records from [facility] inpatient stay few months ago and 

upper endoscopy done then


s/p gastric bypass at [facility] 2013


significant weight loss


h/o endoscopic dilations





no acute general surgical issues


no indication for operative intervention


will follow peripherally - please call with any questions

## 2019-06-29 NOTE — PN.GI
GI Progress Note


Subjective: 





Covering for Dr. Alicia who resumes care 7/1:


patient states "feeling the same"


States that diarrhea stopped last night


Describes her alcohol consumption as from "fri-sunday every week". Describes 

the consumption during thise days as upwards of 10+ drinks








- Objective


Vital Signs: 


 Vital Signs











Temperature  97.8 F   06/29/19 13:56


 


Pulse Rate  90   06/29/19 13:56


 


Respiratory Rate  20   06/29/19 13:56


 


Blood Pressure  131/77   06/29/19 13:56


 


O2 Sat by Pulse Oximetry (%)  100   06/28/19 21:00











Constitutional: Calm


Eyes: Yes: Sclera Icterus


Cardiovascular: Yes: Regular Rate and Rhythm.  No: Murmur


Gastrointestinal Inspection: No: Distention


...Auscultate: Yes: Normoactive Bowel Sounds


...Palpate: Yes: Hepatomegaly, Soft, Tenderness (diffuse TTP)


Neurological: Yes: Alert, Oriented.  No: Asterixis


Labs: 


 CBC, BMP





 06/29/19 05:59 





 06/29/19 05:59 





 INR, PTT











INR  2.20  (0.83-1.09)  H  06/29/19  05:59    








 Laboratory Tests











  06/27/19 06/28/19





  19:40 01:30


 


Hepatitis A Ab Total  Pending 


 


Hep Bs Antigen  Pending 


 


Hep Bs Antibody  Pending 


 


Hep B Core Total Ab  Pending 


 


Hep B Core IgM Ab  Pending 


 


Hepatitis Be Antibody  Pending 


 


Hepatitis Be Antigen  Pending 


 


Hep C Ab Diagnostic  <0.1 


 


O & P Permanent Slide   Pending








 Laboratory Tests











  06/26/19 06/28/19 06/29/19





  12:34 06:20 05:59


 


INR  1.89 H  2.19 H  2.20 H








Microbiology





06/28/19 01:30   Stool   Escherichia coli 0157 Culture - Final


                              NO GROWTH OF VIBRIO SPECIES OBTAINED


                              NO GROWTH OF E COLI 0157 OBTAINED


06/28/19 01:30   Stool   Salmonella/Shigella Culture - Preliminary


06/28/19 01:30   Stool   Yersinia Culture - Preliminary


                              Yeast Like Organism


                              NO ENTERIC PATHOGENS, 24 HOURS, ON PRIMARY PLATES





 Laboratory Tests











  06/29/19





  05:59


 


Serum Folate  3 L














Problem List





- Problems


(1) Jaundice


Assessment/Plan: 


Suspect acute (suspect alcoholic hepatitis) on chronic liver disease.Likely 

both secondary to alcohol abuse. Alternate etiologies of liver disease still 

need to be excluded. 


I have ordered repeat CT scan of the abdomen and pelvis with PO contrast to 

reassess abnormal appearing bowel.  While leukocytosis has improved, she 

continues to complain of severe, diffuse abdominal pain. 


Hepatitis serologies are pending


Monitor mental status for development of hepatic encephalopathy, daily LFTs / 

coags








Code(s): R17 - UNSPECIFIED JAUNDICE

## 2019-06-29 NOTE — CONS
DATE OF CONSULTATION:  06/28/2019

 

HISTORY OF PRESENT ILLNESS:  The patient is a 39-year-old female presented with

severe abdominal pain associated with nausea, vomiting, and bloody diarrhea.  The

patient has been evaluated in the hospital and the question of colitis has been

raised.  Question of mesenteric ischemia likewise has been erased.  Patient has had

abnormalities of liver function tests including elevation of bilirubin.  Patient is

being evaluated for a macrocytic anemia.

 

SOCIAL HISTORY:  The patient is not  but has 9 children.  She is not working. 

She is a nonsmoker.  She is a weekend drinker and will drink several bottles with her

friends on the weekends.  She will not quantitate it for me.  She denies illicit

drugs.  The patient denies industrial exposures or intoxicants.  

 

FAMILY HISTORY:  Includes a father with prostate cancer as well as hypertension. 

Mother with hypertension.  

 

MEDICATION:  Medicines on admission included multivitamins.  No recent travel.  

 

ALLERGIES:  No known allergies.  

 

REVIEW OF SYSTEMS:  Patient has bifrontal headaches, blurry vision, no epistaxis,

some dysphagia with early satiety, short of breath with difficulty taking deep

inspirations.  No chest pain.  Patient never had a mammogram.  Severe abdominal pain,

nausea, vomiting, blood diarrhea with Hematest positive stools.  Patient with

frequent urinary tract infections.  Weakness of the lower extremities and a feeling

of numbness and sleepiness like when your arms fall asleep; that is the way the

patient describes it to her legs.  

 

PAST MEDICAL HISTORY:  The patient denies hypertension, cholesterolemia, diabetes,

hepatitis, gallbladder problems, stroke, MI, thyroid disease, gout, TB. 

 

SURGICAL HISTORY:  The patient has 2 C-sections, has a history of appendectomy, has a

history of gastric bypass surgery 9 years earlier.  The patient has never taken B12

or iron therapy.  Patient has required transfusions in the past.  

 

CURRENT PHYSICAL EXAMINATION:  

VITAL SIGNS:  Blood pressure 123/88, pulse 96, temperature 98.1, respiratory rate 18.

 

HEENT:  The patient is icteric.  The pupils are small, they are reactive.  The

oropharynx is without mucositis or pharyngitis.  There is a small posterior left neck

node, otherwise no cervical, supraclavicular, or axillary nodes.  

LUNGS:  Poor inspiratory effort.  

CARDIAC:  RSR.

BREAST:  No dominant masses.

ABDOMEN:  Tender, and patient would not allow any significant examination of her

abdomen.  

EXTREMITIES:  No significant edema.  

SKIN:  Multiple tattoos.  

 

LABORATORY:  Currently 141 sodium, potassium 3.4, chloride 110, CO2 of 26, BUN 3.7,

creatinine 0.8.  Initial lactate 3.2, calcium 7.2, bilirubin 7.4 to 6.7, direct 5.1,

AST 92, ALT 35, alkaline phosphatase 102, albumin 1.7, protein 5.0.  Tumor markers

pending.  INR 2.19, PTT 46.  Chemistries:  As aforementioned.  CBC initially WBC

10.6, currently 6.4.  Initial hematocrit 33, currently 24.  , MCH 36,

platelets 242 now down to 148.  76 polycytes, currently 14  lymphocytes.  Urine with

bilirubin, leukocyte esterase, 1+ proteinuria.  Stool for occult blood positive. 

Serologies pending.  E. coli urinary tract infection.  CT reviewed MR CT reviewed

with hepatosplenomegaly with severe fatty infiltration versus hepatocellular disease

with portal hypertension as manifested by splenorenal varices, third spacing

suggested by subcutaneous and mesenteric edema and trace perihepatic and perisplenic

fluid, distended gallbladder no stones with wall thickening, pericholecystic fluid

seen, edema of the right colon.  Chest CT scan, no evidence of pulmonary embolism,

left basilar discoid atelectasis.  Abdominal CT, splenomegaly status post gastric

surgery, diffuse hepatic steatosis, mild to moderate gallbladder overdistention,

acute colitis with thickening involving the colon from the level of the cecum through

the splenic flexure, less involvement of the descending and sigmoid diverticula,

small amount of mesenteric edema in the right upper quadrant and lower pelvic areas. 

 

 

CURRENT MEDICINE:  Zofran, Flagyl, piperacillin, heparin, morphine, Protonix, IVs. 

 

IMPRESSION:  A 39-year-old female who presented with abdominal pain, Hemoccult

positive stools, picture compatible with colitis, picture compatible with possible

mesenteric ischemia.  

 

With hepatosplenomegaly and signs of portal hypertension with splenorenal varices. 

Abnormalities of liver function tests and elevation of bilirubin, elevation of PTT.  

 

Anemia likely multifactorial, current chronic disease with colitis.  The patient has

a history of gastric bypass and has never had B12 or iron therapy.  Will obtain

levels and based upon the results empiric therapy with both B12 and IV iron therapy. 

 

 

In view of elevation of INR, patient not eating, abnormalities of liver function,

will give vitamin K as well.  

 

 

DANIELLE TRUONG M.D.

 

[id number]

DD: 06/28/2019 18:02

DT: 06/29/2019 00:58

Job #:  [id number]

## 2019-06-29 NOTE — PN
Progress Note, Physician


History of Present Illness: 


Denies bleeding. Continues to have abdominal pain.








- Current Medication List


Current Medications: 


Active Medications





Folic Acid (Folic Acid -)  1 mg PO DAILY [facility]


   Last Admin: 06/29/19 17:21 Dose:  1 mg


Heparin Sodium (Porcine) (Heparin -)  5,000 unit SQ TID [facility]


   Last Admin: 06/29/19 15:35 Dose:  Not Given


Metronidazole (Flagyl 500mg Premixed Ivpb -)  500 mg in 100 mls @ 100 mls/hr 

IVPB Q8H-IV [facility]


   Last Admin: 06/29/19 17:16 Dose:  100 mls/hr


Piperacillin Sod/Tazobactam (Sod 3.375 gm/ Dextrose)  50 mls @ 100 mls/hr IVPB 

Q8H-IV [facility]; Protocol


   Last Admin: 06/29/19 17:17 Dose:  100 mls/hr


Potassium Chloride/Dextrose/Sod Cl (D5-1/2ns+20 Meq Kcl -)  20 meq in 1,000 mls 

@ 100 mls/hr IV ASDIR [facility]


   Last Admin: 06/29/19 17:18 Dose:  Not Given


Potassium Phosphate 15 mm/ (Dextrose)  255 mls @ 62.5 mls/hr IVPB ONCE ONE


   Stop: 06/29/19 20:49


Morphine Sulfate (Morphine Sulfate)  4 mg IVPUSH Q3H PRN


   PRN Reason: PAIN LEVEL 6-10


   Last Admin: 06/29/19 12:05 Dose:  4 mg


Ondansetron HCl (Zofran Injection)  4 mg IVPUSH Q6H PRN


   PRN Reason: NAUSEA AND/OR VOMITING


Pantoprazole Sodium (Protonix Iv)  40 mg IVPUSH BID [facility]


   Last Admin: 06/29/19 10:32 Dose:  40 mg











- Objective


Vital Signs: 


 Vital Signs











Temperature  97.8 F   06/29/19 13:56


 


Pulse Rate  90   06/29/19 13:56


 


Respiratory Rate  20   06/29/19 13:56


 


Blood Pressure  131/77   06/29/19 13:56


 


O2 Sat by Pulse Oximetry (%)  100   06/29/19 09:00











Constitutional: Yes: No Distress


Eyes: Yes: Sclera Icterus


Cardiovascular: Yes: Regular Rate and Rhythm


Respiratory: Yes: Regular, CTA Bilaterally


Gastrointestinal: Yes: Other (declines abd exam due to pain)


Edema: No


Labs: 


 CBC, BMP





 06/29/19 05:59 





 06/29/19 05:59 





 INR, PTT











INR  2.20  (0.83-1.09)  H  06/29/19  05:59    














Assessment/Plan


39F with hx gastric bypass 9 years ago and heavy etoh use presented with 

diffuse abdominal pain, vomiting, and bloody diarrhea. Found to have colitis, 

HSM with portal hypertension and splenorenal varices, direct bilirubinemia.  

Hematology consulted for macrocytic anemia. Rest of CBC normal. Also with 

prolonged PT/PTT. W/u so far with high b12, normal ferritin (other iron studies 

pending) low folate, LDH normal. 





Anemia likely 2/2 acute blood loss. Also with folate deficiency. Awaiting rest 

of iron studies


Please start folic acid 1 mg daily. Vit K 5 mg IV for possible component of vit 

K deficiency though coagulopathy is probably primarily of liver disease.

## 2019-06-29 NOTE — PN
Progress Note, Physician


History of Present Illness: 





improving


abd pain better








- Current Medication List


Current Medications: 


Active Medications





Heparin Sodium (Porcine) (Heparin -)  5,000 unit SQ TID [facility]


   Last Admin: 06/29/19 05:41 Dose:  Not Given


Metronidazole (Flagyl 500mg Premixed Ivpb -)  500 mg in 100 mls @ 100 mls/hr 

IVPB Q8H-IV [facility]


   Last Admin: 06/29/19 10:31 Dose:  100 mls/hr


Piperacillin Sod/Tazobactam (Sod 3.375 gm/ Dextrose)  50 mls @ 100 mls/hr IVPB 

Q8H-IV [facility]; Protocol


   Last Admin: 06/29/19 10:29 Dose:  100 mls/hr


Potassium Chloride/Dextrose/Sod Cl (D5-1/2ns+20 Meq Kcl -)  20 meq in 1,000 mls 

@ 100 mls/hr IV ASDIR [facility]


   Last Admin: 06/28/19 20:33 Dose:  100 mls/hr


Morphine Sulfate (Morphine Sulfate)  4 mg IVPUSH Q3H PRN


   PRN Reason: PAIN LEVEL 6-10


   Last Admin: 06/29/19 12:05 Dose:  4 mg


Ondansetron HCl (Zofran Injection)  4 mg IVPUSH Q6H PRN


   PRN Reason: NAUSEA AND/OR VOMITING


Pantoprazole Sodium (Protonix Iv)  40 mg IVPUSH BID [facility]


   Last Admin: 06/29/19 10:32 Dose:  40 mg











- Objective


Vital Signs: 


 Vital Signs











Temperature  98.1 F   06/29/19 06:14


 


Pulse Rate  83   06/29/19 06:14


 


Respiratory Rate  18   06/29/19 06:14


 


Blood Pressure  96/45 L  06/29/19 06:14


 


O2 Sat by Pulse Oximetry (%)  100   06/28/19 21:00











Constitutional: Yes: Calm, Mild Distress


Cardiovascular: Yes: S1, S2


Respiratory: Yes: Regular, CTA Bilaterally


Musculoskeletal: Yes: WNL


Extremities: Yes: WNL


Neurological: Yes: Alert, Oriented


Psychiatric: Yes: Alert, Oriented


Labs: 


 CBC, BMP





 06/29/19 05:59 





 06/29/19 05:59 





 INR, PTT











INR  2.20  (0.83-1.09)  H  06/29/19  05:59    














Assessment/Plan





Problem List





- Problems


(1) Generalized abdominal pain


Code(s): R10.84 - GENERALIZED ABDOMINAL PAIN   





(2) Rectal bleeding


Code(s): K62.5 - HEMORRHAGE OF ANUS AND RECTUM   





(3) Colitis


Code(s): K52.9 - NONINFECTIVE GASTROENTERITIS AND COLITIS, UNSPECIFIED   





(4) Bloody diarrhea


Code(s): R19.7 - DIARRHEA, UNSPECIFIED   





(5) Hyperbilirubinemia


Code(s): E80.6 - OTHER DISORDERS OF BILIRUBIN METABOLISM   





(6) Alcoholic liver disorder


Code(s): K70.9 - ALCOHOLIC LIVER DISEASE, UNSPECIFIED   





(7) Microcytic hypochromic anemia


Code(s): D50.9 - IRON DEFICIENCY ANEMIA, UNSPECIFIED   





(8) S/P gastric bypass


Code(s): Z98.84 - BARIATRIC SURGERY STATUS   








patients colitis is quite disffuse


r/o infectious vs ischemic


also uti


patients bilirubin high,cause   


lfts not bad though





plan


continue current mgmt


continue monitoring fn


abd pain


rest as per the team

## 2019-06-29 NOTE — PN
Progress Note, Physician


History of Present Illness: 





Pt still complains of pain but denies any further bloody diarrhea





- Current Medication List


Current Medications: 


Active Medications





Folic Acid (Folic Acid -)  1 mg PO DAILY [facility]


   Last Admin: 06/29/19 17:21 Dose:  1 mg


Heparin Sodium (Porcine) (Heparin -)  5,000 unit SQ TID [facility]


   Last Admin: 06/29/19 21:09 Dose:  5,000 unit


Metronidazole (Flagyl 500mg Premixed Ivpb -)  500 mg in 100 mls @ 100 mls/hr 

IVPB Q8H-IV [facility]


   Last Admin: 06/29/19 17:16 Dose:  100 mls/hr


Piperacillin Sod/Tazobactam (Sod 3.375 gm/ Dextrose)  50 mls @ 100 mls/hr IVPB 

Q8H-IV [facility]; Protocol


   Last Admin: 06/29/19 17:17 Dose:  100 mls/hr


Potassium Chloride/Dextrose/Sod Cl (D5-1/2ns+20 Meq Kcl -)  20 meq in 1,000 mls 

@ 100 mls/hr IV ASDIR [facility]


   Last Admin: 06/29/19 17:18 Dose:  Not Given


Morphine Sulfate (Morphine Sulfate)  4 mg IVPUSH Q3H PRN


   PRN Reason: PAIN LEVEL 6-10


   Last Admin: 06/29/19 21:08 Dose:  4 mg


Ondansetron HCl (Zofran Injection)  4 mg IVPUSH Q6H PRN


   PRN Reason: NAUSEA AND/OR VOMITING


Pantoprazole Sodium (Protonix Iv)  40 mg IVPUSH BID [facility]


   Last Admin: 06/29/19 21:08 Dose:  40 mg











- Objective


Vital Signs: 


 Vital Signs











Temperature  98.2 F   06/29/19 22:00


 


Pulse Rate  85   06/29/19 22:00


 


Respiratory Rate  17   06/29/19 22:00


 


Blood Pressure  99/70   06/29/19 22:00


 


O2 Sat by Pulse Oximetry (%)  100   06/29/19 09:00











Neck: Yes: WNL, Supple


Cardiovascular: Yes: WNL, Regular Rate and Rhythm


Respiratory: Yes: WNL, Regular, CTA Bilaterally


Gastrointestinal: Yes: Normal Bowel Sounds, Soft, Other (Generalized tenderness 

(-) guarding/rebound)


Labs: 


 CBC, BMP





 06/29/19 05:59 





 06/29/19 05:59 





 INR, PTT











INR  2.20  (0.83-1.09)  H  06/29/19  05:59    














Problem List





- Problems


(1) Abdominal pain


Code(s): R10.9 - UNSPECIFIED ABDOMINAL PAIN   





(2) Colitis


Code(s): K52.9 - NONINFECTIVE GASTROENTERITIS AND COLITIS, UNSPECIFIED   





(3) Hyperbilirubinemia


Code(s): E80.6 - OTHER DISORDERS OF BILIRUBIN METABOLISM   





(4) S/P gastric bypass


Code(s): Z98.84 - BARIATRIC SURGERY STATUS   





(5) Anemia


Code(s): D64.9 - ANEMIA, UNSPECIFIED

## 2019-06-30 LAB
SERUM IRON SATURATION: 64 % (ref 15–55)
TIBC SERPL-MCNC: 74 UG/DL (ref 250–450)

## 2019-06-30 RX ADMIN — PIPERACILLIN SODIUM,TAZOBACTAM SODIUM SCH MLS/HR: 3; .375 INJECTION, POWDER, FOR SOLUTION INTRAVENOUS at 17:26

## 2019-06-30 RX ADMIN — HEPARIN SODIUM SCH UNIT: 5000 INJECTION, SOLUTION INTRAVENOUS; SUBCUTANEOUS at 16:22

## 2019-06-30 RX ADMIN — HEPARIN SODIUM SCH UNIT: 5000 INJECTION, SOLUTION INTRAVENOUS; SUBCUTANEOUS at 21:02

## 2019-06-30 RX ADMIN — PIPERACILLIN SODIUM,TAZOBACTAM SODIUM SCH MLS/HR: 3; .375 INJECTION, POWDER, FOR SOLUTION INTRAVENOUS at 11:29

## 2019-06-30 RX ADMIN — POTASSIUM CHLORIDE, DEXTROSE MONOHYDRATE AND SODIUM CHLORIDE SCH MLS/HR: 150; 5; 450 INJECTION, SOLUTION INTRAVENOUS at 21:01

## 2019-06-30 RX ADMIN — PANTOPRAZOLE SODIUM SCH MG: 40 INJECTION, POWDER, FOR SOLUTION INTRAVENOUS at 11:29

## 2019-06-30 RX ADMIN — PANTOPRAZOLE SODIUM SCH MG: 40 INJECTION, POWDER, FOR SOLUTION INTRAVENOUS at 21:02

## 2019-06-30 RX ADMIN — HEPARIN SODIUM SCH UNIT: 5000 INJECTION, SOLUTION INTRAVENOUS; SUBCUTANEOUS at 06:07

## 2019-06-30 RX ADMIN — FOLIC ACID SCH MG: 1 TABLET ORAL at 11:30

## 2019-06-30 RX ADMIN — PIPERACILLIN SODIUM,TAZOBACTAM SODIUM SCH MLS/HR: 3; .375 INJECTION, POWDER, FOR SOLUTION INTRAVENOUS at 01:05

## 2019-06-30 NOTE — PN
Progress Note, Physician


History of Present Illness: 


Endorses continued abdominal pain. No bleeding. Brown loose BM today.








- Current Medication List


Current Medications: 


Active Medications





Folic Acid (Folic Acid -)  1 mg PO DAILY [facility]


   Last Admin: 06/30/19 11:30 Dose:  1 mg


Heparin Sodium (Porcine) (Heparin -)  5,000 unit SQ TID [facility]


   Last Admin: 06/30/19 16:22 Dose:  5,000 unit


Metronidazole (Flagyl 500mg Premixed Ivpb -)  500 mg in 100 mls @ 100 mls/hr 

IVPB Q8H-IV [facility]


   Last Admin: 06/30/19 17:27 Dose:  100 mls/hr


Piperacillin Sod/Tazobactam (Sod 3.375 gm/ Dextrose)  50 mls @ 100 mls/hr IVPB 

Q8H-IV [facility]; Protocol


   Last Admin: 06/30/19 17:26 Dose:  100 mls/hr


Potassium Chloride/Dextrose/Sod Cl (D5-1/2ns+20 Meq Kcl -)  20 meq in 1,000 mls 

@ 100 mls/hr IV ASDIR [facility]


   Last Admin: 06/29/19 17:18 Dose:  Not Given


Morphine Sulfate (Morphine Sulfate)  4 mg IVPUSH Q3H PRN


   PRN Reason: PAIN LEVEL 6-10


   Last Admin: 06/30/19 16:26 Dose:  4 mg


Ondansetron HCl (Zofran Injection)  4 mg IVPUSH Q6H PRN


   PRN Reason: NAUSEA AND/OR VOMITING


Pantoprazole Sodium (Protonix Iv)  40 mg IVPUSH BID [facility]


   Last Admin: 06/30/19 11:29 Dose:  40 mg











- Objective


Vital Signs: 


 Vital Signs











Temperature  98.9 F   06/30/19 17:28


 


Pulse Rate  87   06/30/19 17:28


 


Respiratory Rate  18   06/30/19 17:28


 


Blood Pressure  104/56 L  06/30/19 17:28


 


O2 Sat by Pulse Oximetry (%)  100   06/30/19 09:00











Constitutional: Yes: No Distress, Calm


Eyes: Yes: Sclera Icterus


Cardiovascular: Yes: Regular Rate and Rhythm


Respiratory: Yes: Regular, CTA Bilaterally


Gastrointestinal: Yes: Other (declines)


Peripheral Pulses WNL: No


Labs: 


 CBC, BMP





 06/29/19 05:59 





 06/29/19 05:59 





 INR, PTT











INR  2.20  (0.83-1.09)  H  06/29/19  05:59    














Assessment/Plan


39F with hx gastric bypass 9 years ago and heavy etoh use presented with 

diffuse abdominal pain, vomiting, and bloody diarrhea. Found to have colitis, 

HSM with portal hypertension and splenorenal varices, direct bilirubinemia.  

Hematology consulted for macrocytic anemia. Rest of CBC normal. Also with 

prolonged PT/PTT. W/u so far with high b12, normal ferritin (other iron studies 

pending) low folate, LDH normal. 





Anemia likely 2/2 acute blood loss. Also with folate deficiency. Started on 

folic acid


 s/p Vit K 5 mg on 6/28 with no improvement in coags so far, likely primarily 

liver disese

## 2019-06-30 NOTE — PN
Progress Note, Physician


History of Present Illness: 





Pt still complains of pain but denies any further bloody diarrhea





- Current Medication List


Current Medications: 


Active Medications





Folic Acid (Folic Acid -)  1 mg PO DAILY [facility]


   Last Admin: 06/30/19 11:30 Dose:  1 mg


Heparin Sodium (Porcine) (Heparin -)  5,000 unit SQ TID [facility]


   Last Admin: 06/30/19 21:02 Dose:  5,000 unit


Metronidazole (Flagyl 500mg Premixed Ivpb -)  500 mg in 100 mls @ 100 mls/hr 

IVPB Q8H-IV [facility]


   Last Admin: 06/30/19 17:27 Dose:  100 mls/hr


Piperacillin Sod/Tazobactam (Sod 3.375 gm/ Dextrose)  50 mls @ 100 mls/hr IVPB 

Q8H-IV [facility]; Protocol


   Last Admin: 06/30/19 17:26 Dose:  100 mls/hr


Potassium Chloride/Dextrose/Sod Cl (D5-1/2ns+20 Meq Kcl -)  20 meq in 1,000 mls 

@ 100 mls/hr IV ASDIR [facility]


   Last Admin: 06/30/19 21:01 Dose:  100 mls/hr


Morphine Sulfate (Morphine Sulfate)  4 mg IVPUSH Q3H PRN


   PRN Reason: PAIN LEVEL 6-10


   Last Admin: 06/30/19 20:42 Dose:  4 mg


Ondansetron HCl (Zofran Injection)  4 mg IVPUSH Q6H PRN


   PRN Reason: NAUSEA AND/OR VOMITING


Pantoprazole Sodium (Protonix Iv)  40 mg IVPUSH BID [facility]


   Last Admin: 06/30/19 21:02 Dose:  40 mg











- Objective


Vital Signs: 


 Vital Signs











Temperature  98.9 F   06/30/19 17:28


 


Pulse Rate  87   06/30/19 17:28


 


Respiratory Rate  18   06/30/19 17:28


 


Blood Pressure  104/56 L  06/30/19 17:28


 


O2 Sat by Pulse Oximetry (%)  100   06/30/19 09:00











Cardiovascular: Yes: WNL, Regular Rate and Rhythm


Respiratory: Yes: WNL, Regular, CTA Bilaterally


Gastrointestinal: Yes: Normal Bowel Sounds, Soft, Other (minimal generalized 

tenderness (-) guarding/rebound)


Extremities: Yes: WNL


Edema: No


Labs: 


 CBC, BMP





 06/29/19 05:59 





 06/29/19 05:59 





 INR, PTT











INR  2.20  (0.83-1.09)  H  06/29/19  05:59    














Problem List





- Problems


(1) Abdominal pain


Assessment/Plan: 


?Due to colitis vs alcoholic hepatitis


Repeat ct scan abd continues to show colitis/ascites/hepatomegaly 


Slight increase in Bili to 7


Cont clear liquid diet


Cont IV antribxs


Monitor labs


Cont IVF





Code(s): R10.9 - UNSPECIFIED ABDOMINAL PAIN   





(2) Colitis


Assessment/Plan: 


Cont IV antibxs


Stool studies remain negative


Code(s): K52.9 - NONINFECTIVE GASTROENTERITIS AND COLITIS, UNSPECIFIED   





(3) Hyperbilirubinemia


Code(s): E80.6 - OTHER DISORDERS OF BILIRUBIN METABOLISM   





(4) Anemia


Assessment/Plan: 


?Dilutional


Monitor H/H





Code(s): D64.9 - ANEMIA, UNSPECIFIED   





(5) S/P gastric bypass


Code(s): Z98.84 - BARIATRIC SURGERY STATUS

## 2019-07-01 LAB
ALBUMIN SERPL-MCNC: 1.7 G/DL (ref 3.4–5)
ALP SERPL-CCNC: 97 U/L (ref 45–117)
ALT SERPL-CCNC: 40 U/L (ref 13–61)
ANION GAP SERPL CALC-SCNC: 6 MMOL/L (ref 8–16)
ANISOCYTOSIS BLD QL: (no result)
AST SERPL-CCNC: 99 U/L (ref 15–37)
BASOPHILS # BLD: 0.8 % (ref 0–2)
BILIRUB SERPL-MCNC: 8.2 MG/DL (ref 0.2–1)
BUN SERPL-MCNC: 1.1 MG/DL (ref 7–18)
CALCIUM SERPL-MCNC: 7.5 MG/DL (ref 8.5–10.1)
CHLORIDE SERPL-SCNC: 106 MMOL/L (ref 98–107)
CO2 SERPL-SCNC: 27 MMOL/L (ref 21–32)
CREAT SERPL-MCNC: 0.8 MG/DL (ref 0.55–1.3)
DEPRECATED RDW RBC AUTO: 15.3 % (ref 11.6–15.6)
EOSINOPHIL # BLD: 0.4 % (ref 0–4.5)
GLUCOSE SERPL-MCNC: 85 MG/DL (ref 74–106)
HCT VFR BLD CALC: 24.5 % (ref 32.4–45.2)
HGB BLD-MCNC: 8.2 GM/DL (ref 10.7–15.3)
LYMPHOCYTES # BLD: 13.3 % (ref 8–40)
MACROCYTES BLD QL: (no result)
MCH RBC QN AUTO: 36.2 PG (ref 25.7–33.7)
MCHC RBC AUTO-ENTMCNC: 33.6 G/DL (ref 32–36)
MCV RBC: 107.8 FL (ref 80–96)
MONOCYTES # BLD AUTO: 8.6 % (ref 3.8–10.2)
NEUTROPHILS # BLD: 76.9 % (ref 42.8–82.8)
PLATELET # BLD AUTO: 164 K/MM3 (ref 134–434)
PLATELET BLD QL SMEAR: NORMAL
PMV BLD: 8.6 FL (ref 7.5–11.1)
POTASSIUM SERPLBLD-SCNC: 3.7 MMOL/L (ref 3.5–5.1)
PROT SERPL-MCNC: 5.4 G/DL (ref 6.4–8.2)
RBC # BLD AUTO: 2.28 M/MM3 (ref 3.6–5.2)
SODIUM SERPL-SCNC: 138 MMOL/L (ref 136–145)
WBC # BLD AUTO: 7 K/MM3 (ref 4–10)

## 2019-07-01 RX ADMIN — PIPERACILLIN SODIUM,TAZOBACTAM SODIUM SCH MLS/HR: 3; .375 INJECTION, POWDER, FOR SOLUTION INTRAVENOUS at 01:10

## 2019-07-01 RX ADMIN — PANTOPRAZOLE SODIUM SCH MG: 40 INJECTION, POWDER, FOR SOLUTION INTRAVENOUS at 21:27

## 2019-07-01 RX ADMIN — PIPERACILLIN SODIUM,TAZOBACTAM SODIUM SCH MLS/HR: 3; .375 INJECTION, POWDER, FOR SOLUTION INTRAVENOUS at 09:24

## 2019-07-01 RX ADMIN — PIPERACILLIN SODIUM,TAZOBACTAM SODIUM SCH MLS/HR: 3; .375 INJECTION, POWDER, FOR SOLUTION INTRAVENOUS at 18:28

## 2019-07-01 RX ADMIN — HEPARIN SODIUM SCH UNIT: 5000 INJECTION, SOLUTION INTRAVENOUS; SUBCUTANEOUS at 21:27

## 2019-07-01 RX ADMIN — PHYTONADIONE SCH MG: 10 INJECTION, EMULSION INTRAMUSCULAR; INTRAVENOUS; SUBCUTANEOUS at 17:42

## 2019-07-01 RX ADMIN — HEPARIN SODIUM SCH UNIT: 5000 INJECTION, SOLUTION INTRAVENOUS; SUBCUTANEOUS at 13:39

## 2019-07-01 RX ADMIN — FOLIC ACID SCH MG: 1 TABLET ORAL at 09:24

## 2019-07-01 RX ADMIN — HEPARIN SODIUM SCH UNIT: 5000 INJECTION, SOLUTION INTRAVENOUS; SUBCUTANEOUS at 05:15

## 2019-07-01 RX ADMIN — PANTOPRAZOLE SODIUM SCH MG: 40 INJECTION, POWDER, FOR SOLUTION INTRAVENOUS at 09:25

## 2019-07-01 NOTE — PN
Progress Note, Physician


History of Present Illness: 





abd pain main issues


no blood





- Current Medication List


Current Medications: 


Active Medications





Folic Acid (Folic Acid -)  1 mg PO DAILY [facility]


   Last Admin: 06/30/19 11:30 Dose:  1 mg


Heparin Sodium (Porcine) (Heparin -)  5,000 unit SQ TID [facility]


   Last Admin: 07/01/19 05:15 Dose:  5,000 unit


Metronidazole (Flagyl 500mg Premixed Ivpb -)  500 mg in 100 mls @ 100 mls/hr 

IVPB Q8H-IV [facility]


   Last Admin: 07/01/19 02:01 Dose:  100 mls/hr


Piperacillin Sod/Tazobactam (Sod 3.375 gm/ Dextrose)  50 mls @ 100 mls/hr IVPB 

Q8H-IV [facility]; Protocol


   Last Admin: 07/01/19 01:10 Dose:  100 mls/hr


Potassium Chloride/Dextrose/Sod Cl (D5-1/2ns+20 Meq Kcl -)  20 meq in 1,000 mls 

@ 100 mls/hr IV ASDIR [facility]


   Last Admin: 06/30/19 21:01 Dose:  100 mls/hr


Morphine Sulfate (Morphine Sulfate)  4 mg IVPUSH Q4H PRN


   PRN Reason: pain


   Last Admin: 07/01/19 05:15 Dose:  4 mg


Ondansetron HCl (Zofran Injection)  4 mg IVPUSH Q6H PRN


   PRN Reason: NAUSEA AND/OR VOMITING


Pantoprazole Sodium (Protonix Iv)  40 mg IVPUSH BID [facility]


   Last Admin: 06/30/19 21:02 Dose:  40 mg











- Objective


Vital Signs: 


 Vital Signs











Temperature  98.1 F   07/01/19 05:07


 


Pulse Rate  100 H  07/01/19 05:07


 


Respiratory Rate  18   07/01/19 05:07


 


Blood Pressure  107/65   07/01/19 05:07


 


O2 Sat by Pulse Oximetry (%)  100   06/30/19 21:00











Constitutional: Yes: Calm, Mild Distress


Cardiovascular: Yes: Regular Rate and Rhythm


Respiratory: Yes: Regular, CTA Bilaterally


Gastrointestinal: Yes: Normal Bowel Sounds, Soft, Tenderness


Musculoskeletal: Yes: WNL


Extremities: Yes: WNL


Neurological: Yes: Alert, Oriented


Psychiatric: Yes: Alert, Oriented


Labs: 


 INR, PTT











INR  2.20  (0.83-1.09)  H  06/29/19  05:59    














- ....Imaging


Cat Scan: Report Reviewed, Image Reviewed


MRI: Report Reviewed, Image Reviewed





Assessment/Plan





Problem List





- Problems


(1) Generalized abdominal pain


Code(s): R10.84 - GENERALIZED ABDOMINAL PAIN   





(2) Rectal bleeding


Code(s): K62.5 - HEMORRHAGE OF ANUS AND RECTUM   





(3) Colitis


Code(s): K52.9 - NONINFECTIVE GASTROENTERITIS AND COLITIS, UNSPECIFIED   





(4) Bloody diarrhea


Code(s): R19.7 - DIARRHEA, UNSPECIFIED   





(5) Hyperbilirubinemia


Code(s): E80.6 - OTHER DISORDERS OF BILIRUBIN METABOLISM   





(6) Alcoholic liver disorder


Code(s): K70.9 - ALCOHOLIC LIVER DISEASE, UNSPECIFIED   





(7) Microcytic hypochromic anemia


Code(s): D50.9 - IRON DEFICIENCY ANEMIA, UNSPECIFIED   





(8) S/P gastric bypass


Code(s): Z98.84 - BARIATRIC SURGERY STATUS   








patients colitis is quite disffuse


r/o infectious vs ischemic


also uti


patients bilirubin high,cause   


lfts not bad though





plan


continue abx


final plan awaited


mrcp and repeat ct scan result noted

## 2019-07-01 NOTE — PN
Teaching Attending Note


Name of Resident: Trish Peres











Case discussed with  -- 


coagulaopathy due to ongoing acute liver disease -- but since patient on broad 

spectrum antibiotics will give trial of vitamin K


Marginally elevated CEA/CA19.9 due to ongoing inflammation of the liver


MRCP findings noted -- fatty liver/splenorenal varices/ hepatocellular disease


will follow

## 2019-07-01 NOTE — PN
Physical Exam: 


SUBJECTIVE: Patient seen and examined. Still complains of abdominal pain. 


CT with abnormal transverse and right colon  compatible with colitis, enlarged 

liver with fatty infiltrates and or diffuse hepatocellular disease.














OBJECTIVE:





 Vital Signs











 Period  Temp  Pulse  Resp  BP Sys/Landon  Pulse Ox


 


 Last 24 Hr  97.9 F-98.9 F    18-18  104-117/56-78  











Constitutional: Yes: No Distress, Calm


Eyes: Yes: Other (jaundice sclera)


HENT: Yes: Atraumatic


Cardiovascular: Yes: Regular Rate and Rhythm


Respiratory: Yes: Regular, CTA Bilaterally


Gastrointestinal: Yes: Soft, Hypoactive Bowel Sounds, Tenderness (diffuse)


Neurological: Yes: Alert, Oriented


Psychiatric: Yes: Alert, Oriented














 Laboratory Results - last 24 hr











  07/01/19 07/01/19





  07:06 07:06


 


WBC  7.0 


 


RBC  2.28 L 


 


Hgb  8.2 L 


 


Hct  24.5 L 


 


MCV  107.8 H 


 


MCH  36.2 H 


 


MCHC  33.6 


 


RDW  15.3 


 


Plt Count  164 


 


MPV  8.6 


 


Absolute Neuts (auto)  5.4 


 


Neutrophils %  76.9 


 


Lymphocytes %  13.3 


 


Monocytes %  8.6 


 


Eosinophils %  0.4 


 


Basophils %  0.8 


 


Nucleated RBC %  0 


 


Hypochromia  0 


 


Platelet Estimate  Normal 


 


Polychromasia  1+ 


 


Poikilocytosis  0 


 


Anisocytosis  1+ 


 


Microcytosis  0 


 


Macrocytosis  1+ 


 


Sodium   138


 


Potassium   3.7


 


Chloride   106


 


Carbon Dioxide   27


 


Anion Gap   6 L


 


BUN   1.1 L*


 


Creatinine   0.8


 


Est GFR (CKD-EPI)AfAm   107.64


 


Est GFR (CKD-EPI)NonAf   92.87


 


Random Glucose   85


 


Calcium   7.5 L


 


Ferritin   256.4


 


Total Bilirubin   8.2 H


 


AST   99 H


 


ALT   40


 


Alkaline Phosphatase   97


 


Total Protein   5.4 L


 


Albumin   1.7 L








Active Medications











Generic Name Dose Route Start Last Admin





  Trade Name Freq  PRN Reason Stop Dose Admin


 


Folic Acid  1 mg  06/29/19 15:45  07/01/19 09:24





  Folic Acid -  PO   1 mg





  DAILY [facility]   Administration





     





     





     





     


 


Heparin Sodium (Porcine)  5,000 unit  06/27/19 22:00  07/01/19 13:39





  Heparin -  SQ   5,000 unit





  TID [facility]   Administration





     





     





     





     


 


Metronidazole  500 mg in 100 mls @ 100 mls/hr  06/27/19 10:00  07/01/19 11:14





  Flagyl 500mg Premixed Ivpb -  IVPB   100 mls/hr





  Q8H-IV [facility]   Administration





     





     





     





     


 


Piperacillin Sod/Tazobactam  50 mls @ 100 mls/hr  06/27/19 18:00  07/01/19 09:24





  Sod 3.375 gm/ Dextrose  IVPB   100 mls/hr





  Q8H-IV [facility]   Administration





     





     





  Protocol   





     


 


Morphine Sulfate  4 mg  06/30/19 22:35  07/01/19 13:39





  Morphine Sulfate  IVPUSH   4 mg





  Q4H PRN   Administration





  pain   





     





     





     


 


Ondansetron HCl  4 mg  06/27/19 02:26  





  Zofran Injection  IVPUSH   





  Q6H PRN   





  NAUSEA AND/OR VOMITING   





     





     





     


 


Pantoprazole Sodium  40 mg  06/28/19 22:00  07/01/19 09:25





  Protonix Iv  IVPUSH   40 mg





  BID [facility]   Administration





     





     





     





     


 


Phytonadione  5 mg  07/01/19 17:15  





  Aqua Mephyton Injection -  SQ  07/03/19 10:01  





  DAILY JOSE   





     





     





     





     











ASSESSMENT/PLAN:














Anemia 2/2 Acute blood loss


FOBT +


Folate Deficiency








-S/p Vit K on 6/28 with no improvement in coags likely from liver dz


-Give 3 more days of Vit K 5mg sq.


-Cont. Folic acid


-FU GI reccs









































Visit type





- Emergency Visit


Emergency Visit: Yes


ED Registration Date: 06/26/19


Care time: The patient presented to the Emergency Department on the above date 

and was hospitalized for further evaluation of their emergent condition.





- New Patient


This patient is new to me today: Yes


Date on this admission: 07/01/19





- Critical Care


Critical Care patient: No

## 2019-07-01 NOTE — PN
Progress Note, Physician


History of Present Illness: 





GI FOLLOW UP NOTE


Patient examined and case discussed with Dr Alicia





Patient continue to complain of diffuse abdominal pain.  She states diarrhea 

has resolved, denies rectal bleeding, blood in stool or melena.  CT scan shows 

abnormal transverse and right colon  compatible with colitis, enlarged liver 

with fatty infiltrates and or diffuse hepatocellular disease.  Noted with 

elevated tumor markers, CEA 5.5 and CA 19-9 54.  Patient states last endoscopy 

was 8-9 years ago at [facility], she reports no findings polyps or 

ulcers.  She says it was done after her Bariatric surgery due to post-op 

complications which she described as her "stomach closing up".  Bariatric 

surgery done by Dr Ruiz [id number] at [facility].  Family history of colon CA 

with her paternal grandmother, denies having colonoscopy performed before.  





- Current Medication List


Current Medications: 


Active Medications





Folic Acid (Folic Acid -)  1 mg PO DAILY [facility]


   Last Admin: 06/30/19 11:30 Dose:  1 mg


Heparin Sodium (Porcine) (Heparin -)  5,000 unit SQ TID [facility]


   Last Admin: 07/01/19 05:15 Dose:  5,000 unit


Metronidazole (Flagyl 500mg Premixed Ivpb -)  500 mg in 100 mls @ 100 mls/hr 

IVPB Q8H-IV [facility]


   Last Admin: 07/01/19 02:01 Dose:  100 mls/hr


Piperacillin Sod/Tazobactam (Sod 3.375 gm/ Dextrose)  50 mls @ 100 mls/hr IVPB 

Q8H-IV [facility]; Protocol


   Last Admin: 07/01/19 01:10 Dose:  100 mls/hr


Potassium Chloride/Dextrose/Sod Cl (D5-1/2ns+20 Meq Kcl -)  20 meq in 1,000 mls 

@ 100 mls/hr IV ASDIR JOSE


   Last Admin: 06/30/19 21:01 Dose:  100 mls/hr


Morphine Sulfate (Morphine Sulfate)  4 mg IVPUSH Q4H PRN


   PRN Reason: pain


   Last Admin: 07/01/19 05:15 Dose:  4 mg


Ondansetron HCl (Zofran Injection)  4 mg IVPUSH Q6H PRN


   PRN Reason: NAUSEA AND/OR VOMITING


Pantoprazole Sodium (Protonix Iv)  40 mg IVPUSH BID [facility]


   Last Admin: 06/30/19 21:02 Dose:  40 mg











- Objective


Vital Signs: 


 Vital Signs











Temperature  98.1 F   07/01/19 05:07


 


Pulse Rate  100 H  07/01/19 05:07


 


Respiratory Rate  18   07/01/19 05:07


 


Blood Pressure  107/65   07/01/19 05:07


 


O2 Sat by Pulse Oximetry (%)  100   06/30/19 21:00











Constitutional: Yes: No Distress, Calm


Eyes: Yes: Other (jaundice sclera)


HENT: Yes: Atraumatic


Cardiovascular: Yes: Regular Rate and Rhythm


Respiratory: Yes: Regular, CTA Bilaterally


Gastrointestinal: Yes: Soft, Hypoactive Bowel Sounds, Tenderness (diffuse)


Neurological: Yes: Alert, Oriented


Psychiatric: Yes: Alert, Oriented


Labs: 


 CBC, BMP





 07/01/19 07:06 





 07/01/19 07:06 





 INR, PTT











INR  2.20  (0.83-1.09)  H  06/29/19  05:59    














Problem List





- Problems


(1) Abdominal pain


Assessment/Plan: 


-2/2 Mesenteric ischemia 


-IV hydration


-Zosyn and Flagyl


-MRCP results show hepatospenomegaly with severe fatty infiltration vs 

hepatocellular disease with signs of portal hypertension suggested by early 

splenorenal varices, 3rd spacing suggests subcutaneous and mesenteric edema and 

trace perihepatic and perisplenic free fluid


-Surgical recommendations appreciated


-tumor markers elevated


-Colonoscopy to be done when abdominal pain resolves


-chronic liver work up as outpatient 


Code(s): R10.9 - UNSPECIFIED ABDOMINAL PAIN   





(2) Anemia


Assessment/Plan: 


-monitor Hg daily


-yesterday Hg 8.8


-transfuse for Hg <8.0


-Stool OB positive


Code(s): D64.9 - ANEMIA, UNSPECIFIED   





(3) Bloody diarrhea


Assessment/Plan: 


-Stool OB positive


-monitor Hg daily


-transfuse for Hg <8.0


Code(s): R19.7 - DIARRHEA, UNSPECIFIED   





(4) Hyperbilirubinemia


Assessment/Plan: 


-Bili 7.0


-Hepatitis panel reviewed 


Code(s): E80.6 - OTHER DISORDERS OF BILIRUBIN METABOLISM

## 2019-07-01 NOTE — PN
Progress Note, Physician


History of Present Illness: 





No new complaints


Pt has no appetite and not eating





- Current Medication List


Current Medications: 


Active Medications





Folic Acid (Folic Acid -)  1 mg PO DAILY [facility]


   Last Admin: 07/01/19 09:24 Dose:  1 mg


Heparin Sodium (Porcine) (Heparin -)  5,000 unit SQ TID [facility]


   Last Admin: 07/01/19 21:27 Dose:  5,000 unit


Metronidazole (Flagyl 500mg Premixed Ivpb -)  500 mg in 100 mls @ 100 mls/hr 

IVPB Q8H-IV [facility]


   Last Admin: 07/01/19 17:18 Dose:  100 mls/hr


Piperacillin Sod/Tazobactam (Sod 3.375 gm/ Dextrose)  50 mls @ 100 mls/hr IVPB 

Q[facility]H-IV [facility]; Protocol


   Last Admin: 07/01/19 18:28 Dose:  100 mls/hr


Morphine Sulfate (Morphine Sulfate)  4 mg IVPUSH Q4H PRN


   PRN Reason: pain


   Last Admin: 07/01/19 22:21 Dose:  4 mg


Ondansetron HCl (Zofran Injection)  4 mg IVPUSH Q6H PRN


   PRN Reason: NAUSEA AND/OR VOMITING


Pantoprazole Sodium (Protonix Iv)  40 mg IVPUSH BID [facility]


   Last Admin: 07/01/19 21:27 Dose:  40 mg


Phytonadione (Aqua Mephyton Injection -)  5 mg SQ DAILY [facility]


   Stop: 07/03/19 10:01


   Last Admin: 07/01/19 17:42 Dose:  5 mg











- Objective


Vital Signs: 


 Vital Signs











Temperature  81 F L  07/01/19 18:00


 


Pulse Rate  93 H  07/01/19 18:00


 


Respiratory Rate  18   07/01/19 18:00


 


Blood Pressure  111/62   07/01/19 18:00


 


O2 Sat by Pulse Oximetry (%)  99   07/01/19 09:00











Eyes: Yes: Sclera Icterus


Neck: Yes: WNL, Supple


Cardiovascular: Yes: WNL, Regular Rate and Rhythm


Respiratory: Yes: WNL, Regular, CTA Bilaterally


Gastrointestinal: Yes: Normal Bowel Sounds, Soft, Other (generalized tenderness 

(-) guarding/rebound)


Labs: 


 CBC, BMP





 07/01/19 07:06 





 07/01/19 07:06 





 INR, PTT











INR  2.20  (0.83-1.09)  H  06/29/19  05:59    














Problem List





- Problems


(1) Abdominal pain


Assessment/Plan: 


?Due to colitis vs alcoholic hepatitis


 IV antribxs


Monitor labs


Cont IVF


Cont IV antibxs


Bili has increased


MRI showed hepatomegaly/ascites/hepatocellular dz 





Code(s): R10.9 - UNSPECIFIED ABDOMINAL PAIN   





(2) Colitis


Assessment/Plan: 


Cont IV antibxs


Stool studies remain negative


Code(s): K52.9 - NONINFECTIVE GASTROENTERITIS AND COLITIS, UNSPECIFIED   





(3) Hyperbilirubinemia


Code(s): E80.6 - OTHER DISORDERS OF BILIRUBIN METABOLISM   





(4) S/P gastric bypass


Code(s): Z98.84 - BARIATRIC SURGERY STATUS   





(5) Anemia


Assessment/Plan: 


?Dilutional


Monitor H/H





Code(s): D64.9 - ANEMIA, UNSPECIFIED

## 2019-07-02 LAB
ALBUMIN SERPL-MCNC: 2 G/DL (ref 3.4–5)
ALP SERPL-CCNC: 107 U/L (ref 45–117)
ALT SERPL-CCNC: 47 U/L (ref 13–61)
ANION GAP SERPL CALC-SCNC: 9 MMOL/L (ref 8–16)
APTT BLD: 49.5 SECONDS (ref 25.2–36.5)
AST SERPL-CCNC: 120 U/L (ref 15–37)
BASOPHILS # BLD: 0.8 % (ref 0–2)
BILIRUB SERPL-MCNC: 9.9 MG/DL (ref 0.2–1)
BUN SERPL-MCNC: 1.6 MG/DL (ref 7–18)
CALCIUM SERPL-MCNC: 8 MG/DL (ref 8.5–10.1)
CHLORIDE SERPL-SCNC: 105 MMOL/L (ref 98–107)
CO2 SERPL-SCNC: 24 MMOL/L (ref 21–32)
CREAT SERPL-MCNC: 1 MG/DL (ref 0.55–1.3)
DEPRECATED RDW RBC AUTO: 15.7 % (ref 11.6–15.6)
EOSINOPHIL # BLD: 0.2 % (ref 0–4.5)
GLUCOSE SERPL-MCNC: 80 MG/DL (ref 74–106)
HCT VFR BLD CALC: 28.9 % (ref 32.4–45.2)
HGB BLD-MCNC: 9.5 GM/DL (ref 10.7–15.3)
INR BLD: 2.09 (ref 0.83–1.09)
LYMPHOCYTES # BLD: 10.6 % (ref 8–40)
MCH RBC QN AUTO: 35.9 PG (ref 25.7–33.7)
MCHC RBC AUTO-ENTMCNC: 32.9 G/DL (ref 32–36)
MCV RBC: 109.1 FL (ref 80–96)
MONOCYTES # BLD AUTO: 7.9 % (ref 3.8–10.2)
NEUTROPHILS # BLD: 80.5 % (ref 42.8–82.8)
PLATELET # BLD AUTO: 186 K/MM3 (ref 134–434)
PLATELET BLD QL SMEAR: NORMAL
PMV BLD: 8.6 FL (ref 7.5–11.1)
POTASSIUM SERPLBLD-SCNC: 3.6 MMOL/L (ref 3.5–5.1)
PROT SERPL-MCNC: 6.3 G/DL (ref 6.4–8.2)
PT PNL PPP: 24.9 SEC (ref 9.7–13)
RBC # BLD AUTO: 2.65 M/MM3 (ref 3.6–5.2)
SODIUM SERPL-SCNC: 138 MMOL/L (ref 136–145)
WBC # BLD AUTO: 8.9 K/MM3 (ref 4–10)

## 2019-07-02 RX ADMIN — PENTOXIFYLLINE SCH MG: 400 TABLET, FILM COATED, EXTENDED RELEASE ORAL at 13:43

## 2019-07-02 RX ADMIN — PIPERACILLIN SODIUM,TAZOBACTAM SODIUM SCH MLS/HR: 3; .375 INJECTION, POWDER, FOR SOLUTION INTRAVENOUS at 09:54

## 2019-07-02 RX ADMIN — HEPARIN SODIUM SCH UNIT: 5000 INJECTION, SOLUTION INTRAVENOUS; SUBCUTANEOUS at 21:40

## 2019-07-02 RX ADMIN — PANTOPRAZOLE SODIUM SCH MG: 40 TABLET, DELAYED RELEASE ORAL at 21:40

## 2019-07-02 RX ADMIN — HEPARIN SODIUM SCH UNIT: 5000 INJECTION, SOLUTION INTRAVENOUS; SUBCUTANEOUS at 13:43

## 2019-07-02 RX ADMIN — PANTOPRAZOLE SODIUM SCH MG: 40 TABLET, DELAYED RELEASE ORAL at 09:54

## 2019-07-02 RX ADMIN — PHYTONADIONE SCH MG: 10 INJECTION, EMULSION INTRAMUSCULAR; INTRAVENOUS; SUBCUTANEOUS at 09:54

## 2019-07-02 RX ADMIN — HEPARIN SODIUM SCH UNIT: 5000 INJECTION, SOLUTION INTRAVENOUS; SUBCUTANEOUS at 06:30

## 2019-07-02 RX ADMIN — PIPERACILLIN SODIUM,TAZOBACTAM SODIUM SCH MLS/HR: 3; .375 INJECTION, POWDER, FOR SOLUTION INTRAVENOUS at 02:30

## 2019-07-02 RX ADMIN — PENTOXIFYLLINE SCH MG: 400 TABLET, FILM COATED, EXTENDED RELEASE ORAL at 17:59

## 2019-07-02 RX ADMIN — PIPERACILLIN SODIUM,TAZOBACTAM SODIUM SCH MLS/HR: 3; .375 INJECTION, POWDER, FOR SOLUTION INTRAVENOUS at 17:32

## 2019-07-02 RX ADMIN — FOLIC ACID SCH MG: 1 TABLET ORAL at 09:54

## 2019-07-02 NOTE — PN
Progress Note, Physician





- Current Medication List


Current Medications: 


Active Medications





Folic Acid (Folic Acid -)  1 mg PO DAILY [facility]


   Last Admin: 07/02/19 09:54 Dose:  1 mg


Heparin Sodium (Porcine) (Heparin -)  5,000 unit SQ TID [facility]


   Last Admin: 07/02/19 21:40 Dose:  5,000 unit


Metronidazole (Flagyl 500mg Premixed Ivpb -)  500 mg in 100 mls @ 100 mls/hr 

IVPB Q8H-IV [facility]


   Last Admin: 07/02/19 17:59 Dose:  100 mls/hr


Piperacillin Sod/Tazobactam (Sod 3.375 gm/ Dextrose)  50 mls @ 100 mls/hr IVPB 

Q8H-IV [facility]; Protocol


   Last Admin: 07/02/19 17:32 Dose:  100 mls/hr


Morphine Sulfate (Morphine Sulfate)  4 mg IVPUSH Q4H PRN


   PRN Reason: pain


   Last Admin: 07/02/19 20:20 Dose:  4 mg


Ondansetron HCl (Zofran -)  4 mg PO Q8H PRN


   PRN Reason: NAUSEA AND/OR VOMITING


   Last Admin: 07/02/19 17:41 Dose:  4 mg


Pantoprazole Sodium (Protonix -)  40 mg PO BID [facility]


   Last Admin: 07/02/19 21:40 Dose:  40 mg


Pentoxifylline (Trental -)  400 mg PO TIDCM [facility]


   Last Admin: 07/02/19 17:59 Dose:  400 mg


Phytonadione (Aqua Mephyton Injection -)  5 mg SQ DAILY [facility]


   Stop: 07/03/19 10:01


   Last Admin: 07/02/19 09:54 Dose:  5 mg


Spironolactone (Aldactone -)  50 mg PO DAILY [facility]


   Last Admin: 07/02/19 09:54 Dose:  50 mg











- Objective


Vital Signs: 


 Vital Signs











Temperature  98.0 F   07/02/19 21:43


 


Pulse Rate  91 H  07/02/19 21:43


 


Respiratory Rate  20   07/02/19 21:43


 


Blood Pressure  127/78   07/02/19 21:43


 


O2 Sat by Pulse Oximetry (%)  99   07/02/19 21:00











Labs: 


 CBC, BMP





 07/02/19 08:10 





 07/02/19 08:10 





 INR, PTT











INR  2.09  (0.83-1.09)  H  07/02/19  08:10    


 


Fibrinogen  175.0 mg/dL (238-498)  L  07/02/19  08:10    














Problem List





- Problems


(1) Abdominal pain


Code(s): R10.9 - UNSPECIFIED ABDOMINAL PAIN   





(2) Colitis


Code(s): K52.9 - NONINFECTIVE GASTROENTERITIS AND COLITIS, UNSPECIFIED   





(3) Hyperbilirubinemia


Code(s): E80.6 - OTHER DISORDERS OF BILIRUBIN METABOLISM   





(4) S/P gastric bypass


Code(s): Z98.84 - BARIATRIC SURGERY STATUS   





(5) Anemia


Code(s): D64.9 - ANEMIA, UNSPECIFIED

## 2019-07-02 NOTE — CONSULT
Consult


Consult Specialty:: Nephrology


Reason for Consultation:: low bun





- History of Present Illness


Chief Complaint: abd pain


History of Present Illness: 





Pt is a 39 year old female with pmhx of gastric bypass and liver disease who 

presents to the ER with abdominal pain. She was found to have colitis. She was 

also found to have elevated bili. I was called to evaluate her for low bun. She 

has not been eating and has been on fluids. She complains of lower ext edema. 

SHe denies dysuria or hematuria. She denies shortness of breath. She is awake 

and alert. She complains of abd pain. 





- History Source


History Provided By: Patient, Medical Record





- Past Medical History


Gastrointestinal: Yes: GERD


...LMP: 19


...Pregnant: No


Psych: Yes: Other (Etoh abuse)





- Past Surgical History


Past Surgical History: Yes: Appendectomy, 


Additional Surgical History: gastric bypass





- Alcohol/Substance Use


Hx Alcohol Use: No


History of Substance Use: reports: None





- Smoking History


Smoking history: Never smoked


Have you smoked in the past 12 months: No


If you are a former smoker, when did you quit?: none in about a year, was a 

social smoker only





- Social History


ADL: Independent


History of Recent Travel: No





Home Medications





- Allergies


Allergies/Adverse Reactions: 


 Allergies











Allergy/AdvReac Type Severity Reaction Status Date / Time


 


No Known Allergies Allergy   Verified 19 10:12














- Home Medications


Home Medications: 


Ambulatory Orders





Mv-Mn/Iron/FA/Herbal/Digestive [Prenatal One Tablet] 1 tab PO DAILY 19 











Family Disease History





- Family Disease History


Family Disease History: Diabetes: Father (HTN, prostate CA dx 6m ago, he is 61)

, Mother (HTN, HLD), CA: Grandparent (both grandparents), Other: Father, Mother


Other Family History: mother's side with breast and colon CA; father's side 

with lupus





Review of Systems





- Review of Systems


Constitutional: reports: Malaise


Eyes: reports: No Symptoms


HENT: reports: No Symptoms


Neck: reports: No Symptoms


Cardiovascular: reports: No Symptoms


Respiratory: reports: No Symptoms


Gastrointestinal: reports: Abdominal Pain


Genitourinary: reports: No Symptoms


Musculoskeletal: reports: No Symptoms


Integumentary: reports: No Symptoms


Neurological: reports: No Symptoms


Endocrine: reports: No Symptoms


Hematology/Lymphatic: reports: No Symptoms


Psychiatric: reports: No Symptoms





Physical Exam


Vital Signs: 


 Vital Signs











Temperature  97.7 F   19 14:48


 


Pulse Rate  86   19 14:48


 


Respiratory Rate  20   19 14:48


 


Blood Pressure  96/48 L  19 14:48


 


O2 Sat by Pulse Oximetry (%)  100   19 09:00











Constitutional: Yes: Calm


Eyes: Yes: Conjunctiva Clear


HENT: Yes: Atraumatic


Neck: Yes: Supple


Cardiovascular: Yes: S1, S2


Respiratory: Yes: CTA Bilaterally


Gastrointestinal: Yes: Normal Bowel Sounds, Soft


Renal/: Yes: WNL


Musculoskeletal: Yes: WNL


Edema: Yes


Edema: LLE: 1+, RLE: 1+


Neurological: Yes: Oriented


Psychiatric: Yes: Oriented


Labs: 


 CBC, BMP





 19 08:10 





 19 08:10 











Imaging





- Results


Cat Scan: Report Reviewed





Problem List





- Problems


(1) Malnutrition


Code(s): E46 - UNSPECIFIED PROTEIN-CALORIE MALNUTRITION   





(2) Abdominal pain


Code(s): R10.9 - UNSPECIFIED ABDOMINAL PAIN   





(3) Anemia


Code(s): D64.9 - ANEMIA, UNSPECIFIED   





(4) Colitis


Code(s): K52.9 - NONINFECTIVE GASTROENTERITIS AND COLITIS, UNSPECIFIED   





Assessment/Plan





 Current Medications











Generic Name Dose Route Start Last Admin





  Trade Name Freq  PRN Reason Stop Dose Admin


 


Folic Acid  1 mg  19 15:45  19 09:54





  Folic Acid -  PO   1 mg





  DAILY JOSE   Administration





     





     





     





     


 


Heparin Sodium (Porcine)  5,000 unit  19 22:00  19 13:43





  Heparin -  SQ   5,000 unit





  TID JOSE   Administration





     





     





     





     


 


Metronidazole  500 mg in 100 mls @ 100 mls/hr  19 10:00  19 10:39





  Flagyl 500mg Premixed Ivpb -  IVPB   100 mls/hr





  Q8H-IV [facility]   Administration





     





     





     





     


 


Piperacillin Sod/Tazobactam  50 mls @ 100 mls/hr  19 18:00  19 09:54





  Sod 3.375 gm/ Dextrose  IVPB   100 mls/hr





  Q8H-IV [facility]   Administration





     





     





  Protocol   





     


 


Morphine Sulfate  4 mg  19 22:35  19 11:19





  Morphine Sulfate  IVPUSH   4 mg





  Q4H PRN   Administration





  pain   





     





     





     


 


Ondansetron HCl  4 mg  19 08:17  





  Zofran -  PO   





  Q8H PRN   





  NAUSEA AND/OR VOMITING   





     





     





     


 


Pantoprazole Sodium  40 mg  19 10:00  19 09:54





  Protonix -  PO   40 mg





  BID JO[facility]   Administration





     





     





     





     


 


Pentoxifylline  400 mg  19 12:15  19 13:43





  Trental -  PO   400 mg





  TIDCM JOSE   Administration





     





     





     





     


 


Phytonadione  5 mg  19 17:15  19 09:54





  Aqua Mephyton Injection -  SQ  19 10:01  5 mg





  DAILY JOSE   Administration





     





     





     





     


 


Spironolactone  50 mg  19 10:00  19 09:54





  Aldactone -  PO   50 mg





  DAILY JOSE   Administration





     





     





     





     








Impression


1. low bun/malnutrition


2. colitis


3. hx of gastric bypass


4. abd pain


5. elevated bili


6. anemia


7. etoh abuse





Plan


- pt started on clear, encourage po intake


- cont aldactonne


- cont to monitor bun


- check ua


- GI follow up


- d/c all fluids

## 2019-07-02 NOTE — PN
Progress Note, Physician


History of Present Illness: 





GI FOLLOW UP NOTE


Patient examined and case discussed with Dr Alicia





Patient continue to complain of diffuse abdominal pain and now RUQ pain is 

radiating to back.  She states haing one episode of loose stool, denies rectal 

bleeding, blood in stool or melena.  Patient had endoscopy performed at [facility] on 5/2016 which showed LA grade A mild reflux esophagitis at [facility] 

[place].  Labs are showing elevated INR and elevated total bili 8.2.  Patient 

states that now she is developing lower extremity edema also.





- Current Medication List


Current Medications: 


Active Medications





Folic Acid (Folic Acid -)  1 mg PO DAILY [facility]


   Last Admin: 07/01/19 09:24 Dose:  1 mg


Heparin Sodium (Porcine) (Heparin -)  5,000 unit SQ TID [facility]


   Last Admin: 07/02/19 06:30 Dose:  5,000 unit


Metronidazole (Flagyl 500mg Premixed Ivpb -)  500 mg in 100 mls @ 100 mls/hr 

IVPB Q8H-IV [facility]


   Last Admin: 07/02/19 03:00 Dose:  100 mls/hr


Piperacillin Sod/Tazobactam (Sod 3.375 gm/ Dextrose)  50 mls @ 100 mls/hr IVPB 

Q8H-IV [facility]; Protocol


   Last Admin: 07/02/19 02:30 Dose:  100 mls/hr


Morphine Sulfate (Morphine Sulfate)  4 mg IVPUSH Q4H PRN


   PRN Reason: pain


   Last Admin: 07/02/19 06:29 Dose:  4 mg


Ondansetron HCl (Zofran -)  4 mg PO Q8H PRN


   PRN Reason: NAUSEA AND/OR VOMITING


Pantoprazole Sodium (Protonix -)  40 mg PO BID [facility]


Phytonadione (Aqua Mephyton Injection -)  5 mg SQ DAILY [facility]


   Stop: 07/03/19 10:01


   Last Admin: 07/01/19 17:42 Dose:  5 mg


Spironolactone (Aldactone -)  50 mg PO DAILY [facility]











- Objective


Vital Signs: 


 Vital Signs











Temperature  98 F   07/02/19 05:49


 


Pulse Rate  80   07/02/19 05:49


 


Respiratory Rate  18   07/02/19 05:49


 


Blood Pressure  99/64   07/02/19 05:49


 


O2 Sat by Pulse Oximetry (%)  99   07/01/19 21:00











Constitutional: Yes: No Distress, Calm


Eyes: Yes: Other (jaundice sclera)


HENT: Yes: Atraumatic


Cardiovascular: Yes: Regular Rate and Rhythm


Respiratory: Yes: Regular, CTA Bilaterally


Gastrointestinal: Yes: Normal Bowel Sounds, Soft, Tenderness (diffuse but noted 

to be more tender in upper quadrants)


Edema: Yes (B/L lower extremity)


Neurological: Yes: Alert, Oriented


Psychiatric: Yes: Alert, Oriented


Labs: 


 CBC, BMP





 07/01/19 07:06 





 07/01/19 07:06 





 INR, PTT











INR  2.20  (0.83-1.09)  H  06/29/19  05:59    














Problem List





- Problems


(1) Abdominal pain


Assessment/Plan: 


-2/2 Mesenteric ischemia 


-IV hydration


-Zosyn and Flagyl


-MRCP results show hepatospenomegaly with severe fatty infiltration vs 

hepatocellular disease with signs of portal hypertension suggested by early 

splenorenal varices, 3rd spacing suggests subcutaneous and mesenteric edema and 

trace perihepatic and perisplenic free fluid


-Surgical recommendations appreciated


-tumor markers elevated


-Colonoscopy to be done when abdominal pain resolves


-chronic liver work up as outpatient


-repeat Abdomen and Pelvic CT scan without contrast 


Code(s): R10.9 - UNSPECIFIED ABDOMINAL PAIN   





(2) Anemia


Assessment/Plan: 


-monitor Hg daily


-yesterday Hg 8.8


-transfuse for Hg <8.0


-Stool OB positive


Code(s): D64.9 - ANEMIA, UNSPECIFIED   





(3) Bloody diarrhea


Assessment/Plan: 


-Stool OB positive


-monitor Hg daily


-transfuse for Hg <8.0


Code(s): R19.7 - DIARRHEA, UNSPECIFIED   





(4) Hyperbilirubinemia


Assessment/Plan: 


-Bili 8.2


-Hepatitis panel reviewed 


Code(s): E80.6 - OTHER DISORDERS OF BILIRUBIN METABOLISM   





(5) Alcoholic liver disorder


Assessment/Plan: 


-start on Aldactone 50mg daily


-daily weights


-ammonia level


-AFP 1.8


-RICKY and Anti-smooth muscle result pending


Code(s): K70.9 - ALCOHOLIC LIVER DISEASE, UNSPECIFIED

## 2019-07-02 NOTE — PN
Progress Note, Physician


History of Present Illness: 





still with abd pain


loose bm





- Current Medication List


Current Medications: 


Active Medications





Folic Acid (Folic Acid -)  1 mg PO DAILY [facility]


   Last Admin: 07/02/19 09:54 Dose:  1 mg


Heparin Sodium (Porcine) (Heparin -)  5,000 unit SQ TID [facility]


   Last Admin: 07/02/19 06:30 Dose:  5,000 unit


Metronidazole (Flagyl 500mg Premixed Ivpb -)  500 mg in 100 mls @ 100 mls/hr 

IVPB Q8H-IV [facility]


   Last Admin: 07/02/19 10:39 Dose:  100 mls/hr


Piperacillin Sod/Tazobactam (Sod 3.375 gm/ Dextrose)  50 mls @ 100 mls/hr IVPB 

Q8H-IV [facility]; Protocol


   Last Admin: 07/02/19 09:54 Dose:  100 mls/hr


Morphine Sulfate (Morphine Sulfate)  4 mg IVPUSH Q4H PRN


   PRN Reason: pain


   Last Admin: 07/02/19 11:19 Dose:  4 mg


Ondansetron HCl (Zofran -)  4 mg PO Q8H PRN


   PRN Reason: NAUSEA AND/OR VOMITING


Pantoprazole Sodium (Protonix -)  40 mg PO BID [facility]


   Last Admin: 07/02/19 09:54 Dose:  40 mg


Phytonadione (Aqua Mephyton Injection -)  5 mg SQ DAILY [facility]


   Stop: 07/03/19 10:01


   Last Admin: 07/02/19 09:54 Dose:  5 mg


Spironolactone (Aldactone -)  50 mg PO DAILY [facility]


   Last Admin: 07/02/19 09:54 Dose:  50 mg











- Objective


Vital Signs: 


 Vital Signs











Temperature  97.7 F   07/02/19 10:00


 


Pulse Rate  100 H  07/02/19 10:00


 


Respiratory Rate  20   07/02/19 10:00


 


Blood Pressure  114/81   07/02/19 10:00


 


O2 Sat by Pulse Oximetry (%)  100   07/02/19 09:00











Constitutional: Yes: Calm, Mild Distress


Cardiovascular: Yes: Regular Rate and Rhythm


Respiratory: Yes: Regular, CTA Bilaterally


Gastrointestinal: Yes: Normal Bowel Sounds, Soft


Musculoskeletal: Yes: WNL


Extremities: Yes: WNL


Neurological: Yes: Alert, Oriented


Psychiatric: Yes: Alert, Oriented


Labs: 


 CBC, BMP





 07/02/19 08:10 





 07/02/19 08:10 





 INR, PTT











INR  2.09  (0.83-1.09)  H  07/02/19  08:10    


 


Fibrinogen  175.0 mg/dL (238-498)  L  07/02/19  08:10    














Assessment/Plan





Problem List





- Problems


(1) Generalized abdominal pain


Code(s): R10.84 - GENERALIZED ABDOMINAL PAIN   





(2) Rectal bleeding


Code(s): K62.5 - HEMORRHAGE OF ANUS AND RECTUM   





(3) Colitis


Code(s): K52.9 - NONINFECTIVE GASTROENTERITIS AND COLITIS, UNSPECIFIED   





(4) Bloody diarrhea


Code(s): R19.7 - DIARRHEA, UNSPECIFIED   





(5) Hyperbilirubinemia


Code(s): E80.6 - OTHER DISORDERS OF BILIRUBIN METABOLISM   





(6) Alcoholic liver disorder


Code(s): K70.9 - ALCOHOLIC LIVER DISEASE, UNSPECIFIED   





(7) Microcytic hypochromic anemia


Code(s): D50.9 - IRON DEFICIENCY ANEMIA, UNSPECIFIED   





(8) S/P gastric bypass


Code(s): Z98.84 - BARIATRIC SURGERY STATUS   








patients colitis is quite disffuse


r/o infectious vs ischemic


also uti


patients bilirubin high,cause   


lfts not bad though





plan


continue abx


final plan awaited


mrcp and repeat ct scan result noted


rest as per the team

## 2019-07-03 VITALS — HEART RATE: 75 BPM | SYSTOLIC BLOOD PRESSURE: 106 MMHG | DIASTOLIC BLOOD PRESSURE: 59 MMHG

## 2019-07-03 VITALS — TEMPERATURE: 97.6 F

## 2019-07-03 LAB
ALBUMIN SERPL-MCNC: 1.6 G/DL (ref 3.4–5)
ALP SERPL-CCNC: 85 U/L (ref 45–117)
ALT SERPL-CCNC: 41 U/L (ref 13–61)
ANION GAP SERPL CALC-SCNC: 6 MMOL/L (ref 8–16)
AST SERPL-CCNC: 99 U/L (ref 15–37)
BASOPHILS # BLD: 0.4 % (ref 0–2)
BILIRUB SERPL-MCNC: 7.9 MG/DL (ref 0.2–1)
BUN SERPL-MCNC: 1.8 MG/DL (ref 7–18)
CALCIUM SERPL-MCNC: 7.8 MG/DL (ref 8.5–10.1)
CHLORIDE SERPL-SCNC: 107 MMOL/L (ref 98–107)
CO2 SERPL-SCNC: 27 MMOL/L (ref 21–32)
CREAT SERPL-MCNC: 0.8 MG/DL (ref 0.55–1.3)
DEPRECATED RDW RBC AUTO: 16.3 % (ref 11.6–15.6)
EOSINOPHIL # BLD: 0.2 % (ref 0–4.5)
GLUCOSE SERPL-MCNC: 76 MG/DL (ref 74–106)
HCT VFR BLD CALC: 25.1 % (ref 32.4–45.2)
HGB BLD-MCNC: 8.4 GM/DL (ref 10.7–15.3)
LYMPHOCYTES # BLD: 14 % (ref 8–40)
MCH RBC QN AUTO: 35.9 PG (ref 25.7–33.7)
MCHC RBC AUTO-ENTMCNC: 33.3 G/DL (ref 32–36)
MCV RBC: 108 FL (ref 80–96)
MONOCYTES # BLD AUTO: 8 % (ref 3.8–10.2)
NEUTROPHILS # BLD: 77.4 % (ref 42.8–82.8)
PLATELET # BLD AUTO: 169 K/MM3 (ref 134–434)
PMV BLD: 8.5 FL (ref 7.5–11.1)
POTASSIUM SERPLBLD-SCNC: 3.8 MMOL/L (ref 3.5–5.1)
PROT SERPL-MCNC: 5.4 G/DL (ref 6.4–8.2)
RBC # BLD AUTO: 2.33 M/MM3 (ref 3.6–5.2)
SODIUM SERPL-SCNC: 140 MMOL/L (ref 136–145)
WBC # BLD AUTO: 8.3 K/MM3 (ref 4–10)

## 2019-07-03 RX ADMIN — PENTOXIFYLLINE SCH MG: 400 TABLET, FILM COATED, EXTENDED RELEASE ORAL at 08:39

## 2019-07-03 RX ADMIN — PIPERACILLIN SODIUM,TAZOBACTAM SODIUM SCH MLS/HR: 3; .375 INJECTION, POWDER, FOR SOLUTION INTRAVENOUS at 18:07

## 2019-07-03 RX ADMIN — MORPHINE SULFATE PRN MG: 2 INJECTION, SOLUTION INTRAMUSCULAR; INTRAVENOUS at 06:21

## 2019-07-03 RX ADMIN — MORPHINE SULFATE PRN MG: 2 INJECTION, SOLUTION INTRAMUSCULAR; INTRAVENOUS at 00:58

## 2019-07-03 RX ADMIN — PIPERACILLIN SODIUM,TAZOBACTAM SODIUM SCH MLS/HR: 3; .375 INJECTION, POWDER, FOR SOLUTION INTRAVENOUS at 00:58

## 2019-07-03 RX ADMIN — PANTOPRAZOLE SODIUM SCH MG: 40 TABLET, DELAYED RELEASE ORAL at 09:48

## 2019-07-03 RX ADMIN — PIPERACILLIN SODIUM,TAZOBACTAM SODIUM SCH MLS/HR: 3; .375 INJECTION, POWDER, FOR SOLUTION INTRAVENOUS at 09:48

## 2019-07-03 RX ADMIN — HEPARIN SODIUM SCH: 5000 INJECTION, SOLUTION INTRAVENOUS; SUBCUTANEOUS at 15:13

## 2019-07-03 RX ADMIN — PHYTONADIONE SCH MG: 10 INJECTION, EMULSION INTRAMUSCULAR; INTRAVENOUS; SUBCUTANEOUS at 09:48

## 2019-07-03 RX ADMIN — PENTOXIFYLLINE SCH MG: 400 TABLET, FILM COATED, EXTENDED RELEASE ORAL at 11:40

## 2019-07-03 RX ADMIN — MORPHINE SULFATE PRN MG: 2 INJECTION, SOLUTION INTRAMUSCULAR; INTRAVENOUS at 16:49

## 2019-07-03 RX ADMIN — HEPARIN SODIUM SCH UNIT: 5000 INJECTION, SOLUTION INTRAVENOUS; SUBCUTANEOUS at 06:21

## 2019-07-03 RX ADMIN — FOLIC ACID SCH MG: 1 TABLET ORAL at 09:48

## 2019-07-03 RX ADMIN — PENTOXIFYLLINE SCH MG: 400 TABLET, FILM COATED, EXTENDED RELEASE ORAL at 16:50

## 2019-07-03 NOTE — PN
Progress Note, Physician


History of Present Illness: 





Pt seen and examined at bedside. She is awake and appears comfortable. She says 

that she is starting to eat more. 





- Current Medication List


Current Medications: 


Active Medications





Folic Acid (Folic Acid -)  1 mg PO DAILY [facility]


   Last Admin: 07/03/19 09:48 Dose:  1 mg


Heparin Sodium (Porcine) (Heparin -)  5,000 unit SQ TID [facility]


   Last Admin: 07/03/19 06:21 Dose:  5,000 unit


Metronidazole (Flagyl 500mg Premixed Ivpb -)  500 mg in 100 mls @ 100 mls/hr 

IVPB Q8H-IV [facility]


   Last Admin: 07/03/19 10:24 Dose:  100 mls/hr


Piperacillin Sod/Tazobactam (Sod 3.375 gm/ Dextrose)  50 mls @ 100 mls/hr IVPB 

Q8H-IV [facility]; Protocol


   Last Admin: 07/03/19 09:48 Dose:  100 mls/hr


Morphine Sulfate (Morphine Sulfate)  2 mg IVPUSH Q6H PRN


   PRN Reason: PAIN SCALE 1-5


   Last Admin: 07/03/19 06:21 Dose:  2 mg


Ondansetron HCl (Zofran -)  4 mg PO Q8H PRN


   PRN Reason: NAUSEA AND/OR VOMITING


   Last Admin: 07/02/19 17:41 Dose:  4 mg


Pantoprazole Sodium (Protonix -)  40 mg PO BID [facility]


   Last Admin: 07/03/19 09:48 Dose:  40 mg


Pentoxifylline (Trental -)  400 mg PO TIDCM [facility]


   Last Admin: 07/03/19 11:40 Dose:  400 mg











- Objective


Vital Signs: 


 Vital Signs











Temperature  97.7 F   07/03/19 10:00


 


Pulse Rate  101 H  07/03/19 10:00


 


Respiratory Rate  20   07/03/19 10:00


 


Blood Pressure  114/69   07/03/19 10:00


 


O2 Sat by Pulse Oximetry (%)  100   07/03/19 09:00











Constitutional: Yes: Calm


Eyes: Yes: Conjunctiva Clear


HENT: Yes: Atraumatic


Neck: Yes: Supple


Cardiovascular: Yes: S1, S2


Respiratory: Yes: CTA Bilaterally


Gastrointestinal: Yes: Soft


Genitourinary: Yes: WNL


Musculoskeletal: Yes: WNL


Edema: Yes


Edema: LLE: Trace, RLE: Trace


Integumentary: Yes: Tattoos


Neurological: Yes: Oriented


Psychiatric: Yes: Oriented


Labs: 


 CBC, BMP





 07/03/19 06:20 





 07/03/19 06:20 





 INR, PTT











INR  2.09  (0.83-1.09)  H  07/02/19  08:10    


 


Fibrinogen  175.0 mg/dL (238-498)  L  07/02/19  08:10    














Problem List





- Problems


(1) Malnutrition


Code(s): E46 - UNSPECIFIED PROTEIN-CALORIE MALNUTRITION   





(2) Abdominal pain


Code(s): R10.9 - UNSPECIFIED ABDOMINAL PAIN   





(3) Anemia


Code(s): D64.9 - ANEMIA, UNSPECIFIED   





(4) Colitis


Code(s): K52.9 - NONINFECTIVE GASTROENTERITIS AND COLITIS, UNSPECIFIED   





Assessment/Plan


 Current Medications











Generic Name Dose Route Start Last Admin





  Trade Name Freq  PRN Reason Stop Dose Admin


 


Folic Acid  1 mg  06/29/19 15:45  07/03/19 09:48





  Folic Acid -  PO   1 mg





  DAILY JOSE   Administration





     





     





     





     


 


Heparin Sodium (Porcine)  5,000 unit  06/27/19 22:00  07/03/19 06:21





  Heparin -  SQ   5,000 unit





  TID JOSE   Administration





     





     





     





     


 


Metronidazole  500 mg in 100 mls @ 100 mls/hr  06/27/19 10:00  07/03/19 10:24





  Flagyl 500mg Premixed Ivpb -  IVPB   100 mls/hr





  Q8H-IV JOSE   Administration





     





     





     





     


 


Piperacillin Sod/Tazobactam  50 mls @ 100 mls/hr  06/27/19 18:00  07/03/19 09:48





  Sod 3.375 gm/ Dextrose  IVPB   100 mls/hr





  Q8H-IV JOSE   Administration





     





     





  Protocol   





     


 


Morphine Sulfate  2 mg  07/02/19 22:44  07/03/19 06:21





  Morphine Sulfate  IVPUSH   2 mg





  Q6H PRN   Administration





  PAIN SCALE 1-5   





     





     





     


 


Ondansetron HCl  4 mg  07/02/19 08:17  07/02/19 17:41





  Zofran -  PO   4 mg





  Q8H PRN   Administration





  NAUSEA AND/OR VOMITING   





     





     





     


 


Pantoprazole Sodium  40 mg  07/02/19 10:00  07/03/19 09:48





  Protonix -  PO   40 mg





  BID JOSE   Administration





     





     





     





     


 


Pentoxifylline  400 mg  07/02/19 12:15  07/03/19 11:40





  Trental -  PO   400 mg





  TIDCM JOSE   Administration





     





     





     





     








 Laboratory Tests











  07/03/19





  06:20


 


BUN  1.8 L*











Impression


1. low bun/malnutrition


2. colitis


3. hx of gastric bypass


4. abd pain


5. elevated bili


6. anemia


7. etoh abuse





Plan


- bun is starting to improve


- cont to monitor


- encourage PO intake


- GI follow up for elevated bili


- recommend that she stops drinking alcohol


- follow up repeat ua

## 2019-07-03 NOTE — PN
Progress Note, Physician


History of Present Illness: 





GI FOLLOW UP NOTE


Patient examined and case discussed with Dr Alicia





Patient continue to complain of diffuse abdominal pain.  Complain of nausea and 

had 2 episodes of non-bloody diarrhea yesterday.  Labs from today show total 

bili 7.9, no leukocytosis and afebrile.  Denies rectal bleeding or melena.  

Repeat CT results pending.





- Current Medication List


Current Medications: 


Active Medications





Folic Acid (Folic Acid -)  1 mg PO DAILY [facility]


   Last Admin: 07/02/19 09:54 Dose:  1 mg


Heparin Sodium (Porcine) (Heparin -)  5,000 unit SQ TID [facility]


   Last Admin: 07/03/19 06:21 Dose:  5,000 unit


Metronidazole (Flagyl 500mg Premixed Ivpb -)  500 mg in 100 mls @ 100 mls/hr 

IVPB Q8H-IV [facility]


   Last Admin: 07/03/19 01:47 Dose:  100 mls/hr


Piperacillin Sod/Tazobactam (Sod 3.375 gm/ Dextrose)  50 mls @ 100 mls/hr IVPB 

Q8H-IV [facility]; Protocol


   Last Admin: 07/03/19 00:58 Dose:  100 mls/hr


Morphine Sulfate (Morphine Sulfate)  2 mg IVPUSH Q6H PRN


   PRN Reason: PAIN SCALE 1-5


   Last Admin: 07/03/19 06:21 Dose:  2 mg


Ondansetron HCl (Zofran -)  4 mg PO Q8H PRN


   PRN Reason: NAUSEA AND/OR VOMITING


   Last Admin: 07/02/19 17:41 Dose:  4 mg


Pantoprazole Sodium (Protonix -)  40 mg PO BID [facility]


   Last Admin: 07/02/19 21:40 Dose:  40 mg


Pentoxifylline (Trental -)  400 mg PO TIDCM [facility]


   Last Admin: 07/02/19 17:59 Dose:  400 mg


Phytonadione (Aqua Mephyton Injection -)  5 mg SQ DAILY [facility]


   Stop: 07/03/19 10:01


   Last Admin: 07/02/19 09:54 Dose:  5 mg


Spironolactone (Aldactone -)  50 mg PO DAILY [facility]


   Last Admin: 07/02/19 09:54 Dose:  50 mg











- Objective


Vital Signs: 


 Vital Signs











Temperature  97.6 F   07/03/19 05:22


 


Pulse Rate  70   07/03/19 05:22


 


Respiratory Rate  20   07/03/19 05:22


 


Blood Pressure  125/44 L  07/03/19 05:22


 


O2 Sat by Pulse Oximetry (%)  99   07/02/19 21:00











Constitutional: Yes: No Distress, Calm


HENT: Yes: Atraumatic


Cardiovascular: Yes: Regular Rate and Rhythm


Respiratory: Yes: Regular, CTA Bilaterally


Gastrointestinal: Yes: Normal Bowel Sounds, Soft, Hyperactive Bowel Sounds, 

Tenderness (diffuse)


Musculoskeletal: Yes: WNL


Extremities: Yes: WNL


Edema: Yes


Edema: LLE: 1+, RLE: Trace


Neurological: Yes: Alert, Oriented


Psychiatric: Yes: Alert, Oriented


Labs: 


 CBC, BMP





 07/03/19 06:20 





 INR, PTT











INR  2.09  (0.83-1.09)  H  07/02/19  08:10    


 


Fibrinogen  175.0 mg/dL (238-498)  L  07/02/19  08:10    














Problem List





- Problems


(1) Abdominal pain


Assessment/Plan: 


-2/2 Mesenteric ischemia 


-IV hydration


-Zosyn and Flagyl


-MRCP results show hepatospenomegaly with severe fatty infiltration vs 

hepatocellular disease with signs of portal hypertension suggested by early 

splenorenal varices, 3rd spacing suggests subcutaneous and mesenteric edema and 

trace perihepatic and perisplenic free fluid


-Surgical recommendations appreciated--will reconsult since abdominal pain has 

not subsided


-tumor markers elevated


-chronic liver work up as outpatient


-repeat Abdomen and Pelvic CT scan without contrast 


-started on Trental


Code(s): R10.9 - UNSPECIFIED ABDOMINAL PAIN   





(2) Anemia


Assessment/Plan: 


-monitor Hg daily


-yesterday Hg 8.4


-transfuse for Hg <8.0


-Stool OB positive


Code(s): D64.9 - ANEMIA, UNSPECIFIED   





(3) Bloody diarrhea


Assessment/Plan: 


-Stool OB positive


-monitor Hg daily


-transfuse for Hg <8.0


-resolved


Code(s): R19.7 - DIARRHEA, UNSPECIFIED   





(4) Hyperbilirubinemia


Assessment/Plan: 


-Bili 7.9


-Hepatitis panel reviewed 


Code(s): E80.6 - OTHER DISORDERS OF BILIRUBIN METABOLISM   





(5) Alcoholic liver disorder


Assessment/Plan: 





-daily weights


-ammonia level


-AFP 1.8


-RICKY and Anti-smooth muscle result pending


Code(s): K70.9 - ALCOHOLIC LIVER DISEASE, UNSPECIFIED

## 2019-07-03 NOTE — PN
Progress Note, Physician


History of Present Illness: 





stable


no new issues





- Current Medication List


Current Medications: 


Active Medications





Folic Acid (Folic Acid -)  1 mg PO DAILY [facility]


   Last Admin: 07/02/19 09:54 Dose:  1 mg


Heparin Sodium (Porcine) (Heparin -)  5,000 unit SQ TID [facility]


   Last Admin: 07/03/19 06:21 Dose:  5,000 unit


Metronidazole (Flagyl 500mg Premixed Ivpb -)  500 mg in 100 mls @ 100 mls/hr 

IVPB Q8H-IV [facility]


   Last Admin: 07/03/19 01:47 Dose:  100 mls/hr


Piperacillin Sod/Tazobactam (Sod 3.375 gm/ Dextrose)  50 mls @ 100 mls/hr IVPB 

Q8H-IV [facility]; Protocol


   Last Admin: 07/03/19 00:58 Dose:  100 mls/hr


Morphine Sulfate (Morphine Sulfate)  2 mg IVPUSH Q6H PRN


   PRN Reason: PAIN SCALE 1-5


   Last Admin: 07/03/19 06:21 Dose:  2 mg


Ondansetron HCl (Zofran -)  4 mg PO Q8H PRN


   PRN Reason: NAUSEA AND/OR VOMITING


   Last Admin: 07/02/19 17:41 Dose:  4 mg


Pantoprazole Sodium (Protonix -)  40 mg PO BID [facility]


   Last Admin: 07/02/19 21:40 Dose:  40 mg


Pentoxifylline (Trental -)  400 mg PO TIDCM [facility]


   Last Admin: 07/02/19 17:59 Dose:  400 mg


Phytonadione (Aqua Mephyton Injection -)  5 mg SQ DAILY [facility]


   Stop: 07/03/19 10:01


   Last Admin: 07/02/19 09:54 Dose:  5 mg


Spironolactone (Aldactone -)  50 mg PO DAILY [facility]


   Last Admin: 07/02/19 09:54 Dose:  50 mg











- Objective


Vital Signs: 


 Vital Signs











Temperature  97.6 F   07/03/19 05:22


 


Pulse Rate  70   07/03/19 05:22


 


Respiratory Rate  20   07/03/19 05:22


 


Blood Pressure  125/44 L  07/03/19 05:22


 


O2 Sat by Pulse Oximetry (%)  99   07/02/19 21:00











Constitutional: Yes: Calm, Mild Distress


HENT: Yes: Atraumatic


Cardiovascular: Yes: Regular Rate and Rhythm


Respiratory: Yes: Regular, CTA Bilaterally


Gastrointestinal: Yes: Normal Bowel Sounds, Soft


Musculoskeletal: Yes: WNL


Extremities: Yes: WNL


Neurological: Yes: Alert, Oriented


Psychiatric: Yes: Alert, Oriented


Labs: 


 CBC, BMP





 07/03/19 06:20 





 07/03/19 06:20 





 INR, PTT











INR  2.09  (0.83-1.09)  H  07/02/19  08:10    


 


Fibrinogen  175.0 mg/dL (238-498)  L  07/02/19  08:10    














Assessment/Plan





Problem List





- Problems


(1) Generalized abdominal pain


Code(s): R10.84 - GENERALIZED ABDOMINAL PAIN   





(2) Rectal bleeding


Code(s): K62.5 - HEMORRHAGE OF ANUS AND RECTUM   





(3) Colitis


Code(s): K52.9 - NONINFECTIVE GASTROENTERITIS AND COLITIS, UNSPECIFIED   





(4) Bloody diarrhea


Code(s): R19.7 - DIARRHEA, UNSPECIFIED   





(5) Hyperbilirubinemia


Code(s): E80.6 - OTHER DISORDERS OF BILIRUBIN METABOLISM   





(6) Alcoholic liver disorder


Code(s): K70.9 - ALCOHOLIC LIVER DISEASE, UNSPECIFIED   





(7) Microcytic hypochromic anemia


Code(s): D50.9 - IRON DEFICIENCY ANEMIA, UNSPECIFIED   





(8) S/P gastric bypass


Code(s): Z98.84 - BARIATRIC SURGERY STATUS   








patients colitis is quite disffuse


r/o infectious vs ischemic


also uti


patients bilirubin high,cause   


lfts not bad though





plan


continue abx


final plan awaited


will have to decide further plan

## 2019-08-27 NOTE — EKG
Test Reason : 

Blood Pressure : ***/*** mmHG

Vent. Rate : 113 BPM     Atrial Rate : 113 BPM

   P-R Int : 124 ms          QRS Dur : 072 ms

    QT Int : 292 ms       P-R-T Axes : 035 004 033 degrees

   QTc Int : 400 ms

 

SINUS TACHYCARDIA

CANNOT RULE OUT ANTERIOR INFARCT , AGE UNDETERMINED

ABNORMAL ECG

NO PREVIOUS ECGS AVAILABLE

Confirmed by Ronan Mcdaniel MD ([id number]) on 8/27/2019 9:38:07 AM

 

Referred By:             Confirmed By:Ronan Mcdaniel MD

## 2020-03-06 ENCOUNTER — HOSPITAL ENCOUNTER (INPATIENT)
Dept: HOSPITAL 74 - JER | Age: 40
LOS: 5 days | Discharge: HOME | DRG: 433 | End: 2020-03-11
Attending: INTERNAL MEDICINE | Admitting: INTERNAL MEDICINE
Payer: COMMERCIAL

## 2020-03-06 VITALS — BODY MASS INDEX: 23.8 KG/M2

## 2020-03-06 DIAGNOSIS — K59.00: ICD-10-CM

## 2020-03-06 DIAGNOSIS — R10.9: ICD-10-CM

## 2020-03-06 DIAGNOSIS — D64.9: ICD-10-CM

## 2020-03-06 DIAGNOSIS — Z98.84: ICD-10-CM

## 2020-03-06 DIAGNOSIS — K76.89: ICD-10-CM

## 2020-03-06 DIAGNOSIS — R16.1: ICD-10-CM

## 2020-03-06 DIAGNOSIS — R17: ICD-10-CM

## 2020-03-06 DIAGNOSIS — K70.31: Primary | ICD-10-CM

## 2020-03-06 DIAGNOSIS — K56.41: ICD-10-CM

## 2020-03-06 LAB
ALBUMIN SERPL-MCNC: 2.5 G/DL (ref 3.4–5)
ALP SERPL-CCNC: 177 U/L (ref 45–117)
ALT SERPL-CCNC: 40 U/L (ref 13–61)
ANION GAP SERPL CALC-SCNC: 8 MMOL/L (ref 8–16)
ANISOCYTOSIS BLD QL: (no result)
APTT BLD: 26.7 SECONDS (ref 25.2–36.5)
AST SERPL-CCNC: 124 U/L (ref 15–37)
BASOPHILS # BLD: 0.6 % (ref 0–2)
BILIRUB SERPL-MCNC: 4.5 MG/DL (ref 0.2–1)
BUN SERPL-MCNC: 7.2 MG/DL (ref 7–18)
CALCIUM SERPL-MCNC: 8.4 MG/DL (ref 8.5–10.1)
CHLORIDE SERPL-SCNC: 102 MMOL/L (ref 98–107)
CO2 SERPL-SCNC: 27 MMOL/L (ref 21–32)
CREAT SERPL-MCNC: 1 MG/DL (ref 0.55–1.3)
DEPRECATED RDW RBC AUTO: 23.5 % (ref 11.6–15.6)
EOSINOPHIL # BLD: 0.5 % (ref 0–4.5)
GLUCOSE SERPL-MCNC: 93 MG/DL (ref 74–106)
HCT VFR BLD CALC: 33.7 % (ref 32.4–45.2)
HGB BLD-MCNC: 11.4 GM/DL (ref 10.7–15.3)
INR BLD: 1.44 (ref 0.83–1.09)
LIPASE SERPL-CCNC: 95 U/L (ref 73–393)
LYMPHOCYTES # BLD: 30.4 % (ref 8–40)
MACROCYTES BLD QL: (no result)
MACROPHAGES NFR PLR MANUAL: 50 %
MAGNESIUM SERPL-MCNC: 1.9 MG/DL (ref 1.8–2.4)
MCH RBC QN AUTO: 29 PG (ref 25.7–33.7)
MCHC RBC AUTO-ENTMCNC: 33.7 G/DL (ref 32–36)
MCV RBC: 86.1 FL (ref 80–96)
MESOTHL CELL NFR PLR MANUAL: 3 %
MONOCYTES # BLD AUTO: 6 % (ref 3.8–10.2)
MONOCYTES NFR PLR MANUAL: 6 %
NEUTROPHILS # BLD: 62.5 % (ref 42.8–82.8)
NUC CELL # FLD: 181 /MM3
OVALOCYTES BLD QL SMEAR: (no result)
PLATELET # BLD AUTO: 160 K/MM3 (ref 134–434)
PLATELET BLD QL SMEAR: NORMAL
PMV BLD: 9.2 FL (ref 7.5–11.1)
POTASSIUM SERPLBLD-SCNC: 4.1 MMOL/L (ref 3.5–5.1)
PROT SERPL-MCNC: 8.7 G/DL (ref 6.4–8.2)
PT PNL PPP: 17.1 SEC (ref 9.7–13)
RBC # BLD AUTO: 3.92 M/MM3 (ref 3.6–5.2)
SODIUM SERPL-SCNC: 136 MMOL/L (ref 136–145)
TARGETS BLD QL SMEAR: (no result)
WBC # BLD AUTO: 6 K/MM3 (ref 4–10)

## 2020-03-06 NOTE — PDOC
History of Present Illness





- General


Chief Complaint: Pain


Stated Complaint: ABD. PAIN/ FEVER


Time Seen by Provider: 20 10:47


History Source: Patient


Exam Limitations: No Limitations





- History of Present Illness


Initial Comments: 





20 21:23


39F PMH pancreatitis, cirrhosis, ?renal infection presenting with 3 days of 

constant pulling-like whole abdominal pain that is worsening. Endorses f/c, 

nausea, diarrhea, ADONAY. Endorsing chest tightness. Denies etoh, dysuria. Surg: 

gastric sleeve - no NSAIDs. 











Past History





- Past Medical History


Allergies/Adverse Reactions: 


                                    Allergies











Allergy/AdvReac Type Severity Reaction Status Date / Time


 


No Known Allergies Allergy   Verified 19 10:12











Home Medications: 


Ambulatory Orders





Mv-Mn/Iron/FA/Herbal/Digestive [Prenatal One Tablet] 1 tab PO DAILY 19 








Anemia: Yes


Asthma: No


Cancer: No


Cardiac Disorders: Yes


CVA: No


COPD: No


CHF: No


Dementia: No


Diabetes: No


GI Disorders: Yes


 Disorders: No


HTN: No


Hypercholesterolemia: No


Liver Disease: No


Seizures: No


Thyroid Disease: No





- Surgical History


Abdominal Surgery: Yes


Appendectomy: Yes


Cardiac Surgery: No


Cholecystectomy: No


Lung Surgery: No


Neurologic Surgery: No





- Reproductive History


 (#): 7


Para: 8


Spontaneous : 0





- Immunization History


Immunization Up to Date: Yes





- Psycho Social/Smoking Cessation Hx


Smoking Status: No


Smoking History: Never smoked


Have you smoked in the past 12 months: No


Number of Cigarettes Smoked Daily: 2


If you are a former smoker, when did you quit?: none in about a year, was a so

cial smoker only


Information on smoking cessation initiated: No


'Breaking Loose' booklet given: 17


Hx Alcohol Use: No


Drug/Substance Use Hx: No


Hx Substance Use Treatment: No





**Review of Systems





- Review of Systems


Able to Perform ROS?: Yes


Comments:: 





20 21:23





CONSTITUTIONAL: endorses F / C


HEENT: Denies headache


RESP: Denies SOB


CARD: endorses chest tightness


GI: endorses abd pain; nausea, nbnb diarrhea. Denies bloody stool


: Denies dysuria, hematuria


SKIN: Denies rashes


NEURO: Denies numbness, tingling, weakness


MSK: Denies back pain








Is the patient limited English proficient: No





*Physical Exam





- Vital Signs


                                Last Vital Signs











Temp Pulse Resp BP Pulse Ox


 


 97.8 F   66   16   113/69   100 


 


 20 10:22  20 10:22  20 10:22  20 10:22  20 10:22














- Physical Exam





20 21:23


GEN: tearful, uncomfortable. AAOx3.


HEENT: NC/AT. left subconjunctival hemorrhage**. No facial asymmetry. Normal 

voice. Supple neck w/ FROM.


CV: S1/S2, RRR, no m/r/g


LUNG: CTAB, no wheezes, crackles, rales, rhonchi. 


GI: diffuse TTP w/o guarding, rebound. +RCVAT. Soft, +BS. 


MSK: No obvious deformities of all extremities. 


SKIN: Warm, dry, no rashes appreciated.


PSYCH: Normal mood and affect.


NEURO: Moving all extremities well. ambulates w/ normal gait





** states from a [employer] ball. 





Procedures





- Additional Procedures


Additional Procedures: other


Progress: 





20 19:13


diagnostic paracentesis for ascites 





pt consent for procedure


pt placed on slight left lateral decub position 


area for tap identified under ultrasound guidance -> LLQ


area marked and cleaned w/ chloraprep 


sterile drapes applied 


local anesthetic applied (1% lidocaine w/ epi) w/ good effect





18G catheter inserted into the marked area with successful draining of straw 

coloured nonpurulent nonbloody ascitic fluid


120mL of ascitic fluid was removed using two (2) 60mL syringes





gauze and bandage applied after catheter was removed. 


pt tolerated the procedure well.  


Fluids sent for labs. 








ED Treatment Course





- LABORATORY


CBC & Chemistry Diagram: 


                                 20 10:40





                                 20 10:40





Medical Decision Making





- Medical Decision Making





20 11:22


39F PMH pancreatitis, cirrhosis, ?renal infection c/o 3 days of abdominal pain. 

diffuse TTP w/o guarding. 


DDx - colitis, pancreatitis, ascites, renal abscess, pyelonephritis, UTI 


- labs


- ekg 


- meds 


- CT 





20 13:33


elavated ALK.Phos


elevated T.Bili; AST appear approximately baseline 


elevated ammonia 





20 15:35


pain persists but was helped by morphine; has returned


morphine and NS 





20 16:12


Interval decrease in size of the liver that now appears to be a heterogeneous. 

Together with 


splenomegaly, findings are suggestive of liver cirrhosis 


 No discrete hepatic mass is identified. 


 Status post gastric bariatric surgery with suggestion of a tiny hiatus hernia 

again noted. 


 Large amount of free fluid/ascites in the abdomen and pelvis. 


 There is suggestion of some thickening of the small bowel loops likely due to 

surrounding ascites 


without evidence of small bowel obstruction. 


 Nondistention of the colon, cannot rule out thickening of the ascending colon 

as well as the 


hepatic flexure. Does the patient have any symptoms suggestive of colitis. 


 Nonvisualization of the appendix 


 L5-S1 mild mainly central and right paracentral disc bulge reaching right S1 

nerve root, without 


gross impingement. Correlate clinically. 





>> diagnostic paracentesis 





20 16:47


consent obtained for dx paracentesis - patient expresses understanding of pro

cedure, risks, benefits, and alternatives; amenable to procedure.





20 19:17


dx paracentesis complete, see procedure note. 


fluids sent. 


signed out to PM team for admission 








Discharge





- Discharge Information


Problems reviewed: Yes


Clinical Impression/Diagnosis: 


Ascites


Qualifiers:


 Ascites type: other type Qualified Code(s): R18.8 - Other ascites





Condition: Stable





- Admission


Yes





- Follow up/Referral





- Patient Discharge Instructions





- Post Discharge Activity

## 2020-03-06 NOTE — PDOC
Documentation entered by Elvia Crisostomo SCRIBE, acting as scribe for 

Misael Paulino MD.








Misael Paulino MD:  This documentation has been prepared by the Andressa regan Nirvannie, SCRIBE, under my direction and personally reviewed by me in 

its entirety.  I confirm that the documentation accurately reflects all work, 

treatment, procedures, and medical decision making performed by me.  





Attending Attestation





- Resident


Resident Name: RaeArcadio





- ED Attending Attestation


I have performed the following: I have examined & evaluated the patient, The 

case was reviewed & discussed with the resident, I agree w/resident's findings &

plan, Exceptions are as noted





- HPI


HPI: 





03/06/20 12:13


CC: abdominal pain with associated nausea, diarrhea, decreased PO intake, and 

chest tightness


 


HPI:





The patient is a 39 year old female, with a significant past medical history of,

who presents to the emergency department with 3 days of progressively worsening 

abdominal pain with associated nausea, diarrhea, decreased PO intake, and chest 

tightness. He denies any recent fevers or chills.





Allergies: NKDA


Primary Care Physician: Dr. KAYLA Shaffer


03/09/20 14:18








- Physicial Exam


PE: 





03/06/20 15:04





Vitals: Triage Vital signs reviewed


General Appearance: Mild Distress


Head: Atraumatic, 


Chest Wall: Nontender


Cardiac: Regular rate and rhythym, no murmurs, no rubs, no gallops, 


Lungs: Clear to auscultation bilateral, good air movement bilaterally,


Abdomen: Diffuse abdominal pain


Extremities: Full range of motion to all extremities, no cyanosis, clubbing, or 

edema


Skin: Warm and dry, no rashes or lesions, no rash, no petechiae


Neuro: AOX3; cranial Nerves 2-12 grossly intact, strength intact to all 

extremities, sensation intact to all extremities, gait normal


Psych: Normal mood, normal affect





- Medical Decision Making





03/06/20 12:13


39 year old female, with a significant past medical history of, who presents to 

the emergency department with 3 days of progressively worsening abdominal pain 

with associated nausea, diarrhea, decreased PO intake, and chest tightness.





Plan is: 


Ammonia


Serum Pregnant 


UA/UC


Abdomen and Pelvis CT


EKG


[facility]





Dr. BOYER to follow up results and reasses

## 2020-03-06 NOTE — PDOC
*Physical Exam





- Vital Signs


                                Last Vital Signs











Temp Pulse Resp BP Pulse Ox


 


 98.0 F   66   18   99/53 L  99 


 


 03/06/20 17:23  03/06/20 17:23  03/06/20 14:49  03/06/20 17:23  03/06/20 17:23














ED Treatment Course





- LABORATORY


CBC & Chemistry Diagram: 


                                 03/06/20 10:40





                                 03/06/20 10:40





- ADDITIONAL ORDERS


Additional order review: 


                               Laboratory  Results











  03/06/20 03/06/20 03/06/20





  10:40 10:40 10:40


 


PT with INR   


 


INR   


 


PTT (Actin FS)   


 


Sodium    136


 


Potassium    4.1


 


Chloride    102


 


Carbon Dioxide    27


 


Anion Gap    8


 


BUN    7.2


 


Creatinine    1.0


 


Est GFR (CKD-EPI)AfAm    82.19


 


Est GFR (CKD-EPI)NonAf    70.91


 


Random Glucose    93


 


Calcium    8.4 L


 


Magnesium    1.9


 


Total Bilirubin    4.5 H


 


AST    124 H


 


ALT    40


 


Alkaline Phosphatase    177 H


 


Ammonia   51.30 H 


 


Creatine Kinase    103


 


Troponin I    < 0.02


 


Total Protein    8.7 H


 


Albumin    2.5 L


 


Lipase    95


 


Serum Pregnancy, Qual  Negative  














  03/06/20





  10:40


 


PT with INR  17.10 H


 


INR  1.44 H


 


PTT (Actin FS)  26.7


 


Sodium 


 


Potassium 


 


Chloride 


 


Carbon Dioxide 


 


Anion Gap 


 


BUN 


 


Creatinine 


 


Est GFR (CKD-EPI)AfAm 


 


Est GFR (CKD-EPI)NonAf 


 


Random Glucose 


 


Calcium 


 


Magnesium 


 


Total Bilirubin 


 


AST 


 


ALT 


 


Alkaline Phosphatase 


 


Ammonia 


 


Creatine Kinase 


 


Troponin I 


 


Total Protein 


 


Albumin 


 


Lipase 


 


Serum Pregnancy, Qual 








                                        











  03/06/20





  10:40


 


RBC  3.92


 


MCV  86.1


 


MCHC  33.7


 


RDW  23.5 H


 


MPV  9.2


 


Neutrophils %  62.5


 


Lymphocytes %  30.4  D


 


Monocytes %  6.0


 


Eosinophils %  0.5  D


 


Basophils %  0.6














- Medications


Given in the ED: 


ED Medications














Discontinued Medications














Generic Name Dose Route Start Last Admin





  Trade Name Freq  PRN Reason Stop Dose Admin


 


Acetaminophen  1,000 mg  03/06/20 11:16  03/06/20 11:33





  Ofirmev Injection -  IVPB  03/06/20 11:17  Not Given





  ONCE ONE  


 


Lidocaine/Epinephrine  1 ml  03/06/20 16:20  03/06/20 16:40





  Xylocaine 1%-Epi 1:100,000  INF  03/06/20 16:21  1 ml





  ONCE ONE   Administration


 


Morphine Sulfate  4 mg  03/06/20 11:23  03/06/20 11:33





  Morphine Injection -  IVPUSH  03/06/20 11:24  4 mg





  ONCE ONE   Administration


 


Morphine Sulfate  4 mg  03/06/20 15:34  03/06/20 15:55





  Morphine Injection -  IVPUSH  03/06/20 15:35  4 mg





  ONCE ONE   Administration


 


Sodium Chloride  1,000 ml  03/06/20 15:34  03/06/20 15:55





  Normal Saline -  IV  03/06/20 15:35  1,000 ml





  ONCE ONE   Administration


 


Sodium Chloride  1,000 ml  03/06/20 17:30  03/06/20 18:22





  Normal Saline -  IV  03/06/20 17:31  1,000 ml





  ONCE ONE   Administration














Medical Decision Making





- Medical Decision Making





The pt is a 39F w/ a history of pancreatitis, cirrhosis, ascites who presents 

with generalized abdominal and distention. Pt was found to have ascites


s/p dx tap (straw colored fluid)


Plan for admission to Dr. Lemons's service


03/06/20 19:07





Pt signed out to Dr. Lemons


Will place GI consult order on-call service, Dr. English


03/06/20 19:40








Discharge





- Discharge Information


Problems reviewed: Yes


Clinical Impression/Diagnosis: 


Ascites


Qualifiers:


 Ascites type: other type Qualified Code(s): R18.8 - Other ascites





Condition: Stable





- Admission


Yes





- Follow up/Referral


Referrals: 


Darrin Shaffer MD [Primary Care Provider] - 





- Patient Discharge Instructions





- Post Discharge Activity

## 2020-03-07 NOTE — EKG
Test Reason : 

Blood Pressure : ***/*** mmHG

Vent. Rate : 060 BPM     Atrial Rate : 060 BPM

   P-R Int : 134 ms          QRS Dur : 088 ms

    QT Int : 502 ms       P-R-T Axes : 038 017 047 degrees

   QTc Int : 502 ms

 

NORMAL SINUS RHYTHM WITH SINUS ARRHYTHMIA

PROLONGED QT

ABNORMAL ECG

Confirmed by Ronan Mcdaniel MD ([id number]) on 3/7/2020 10:30:11 AM

 

Referred By:             Confirmed By:Ronan Mcdaniel MD

## 2020-03-08 LAB
ALBUMIN SERPL-MCNC: 2.2 G/DL (ref 3.4–5)
ALP SERPL-CCNC: 146 U/L (ref 45–117)
ALT SERPL-CCNC: 34 U/L (ref 13–61)
AMYLASE SERPL-CCNC: 63 U/L (ref 25–115)
ANION GAP SERPL CALC-SCNC: 5 MMOL/L (ref 8–16)
AST SERPL-CCNC: 104 U/L (ref 15–37)
BASOPHILS # BLD: 0.8 % (ref 0–2)
BILIRUB CONJ SERPL-MCNC: 1.7 MG/DL (ref 0–0.2)
BILIRUB SERPL-MCNC: 3.6 MG/DL (ref 0.2–1)
BUN SERPL-MCNC: 6.6 MG/DL (ref 7–18)
CALCIUM SERPL-MCNC: 7.7 MG/DL (ref 8.5–10.1)
CHLORIDE SERPL-SCNC: 104 MMOL/L (ref 98–107)
CO2 SERPL-SCNC: 29 MMOL/L (ref 21–32)
CREAT SERPL-MCNC: 0.8 MG/DL (ref 0.55–1.3)
DEPRECATED RDW RBC AUTO: 22.9 % (ref 11.6–15.6)
EOSINOPHIL # BLD: 0.8 % (ref 0–4.5)
GLUCOSE SERPL-MCNC: 66 MG/DL (ref 74–106)
HCT VFR BLD CALC: 31.8 % (ref 32.4–45.2)
HGB BLD-MCNC: 10.7 GM/DL (ref 10.7–15.3)
LIPASE SERPL-CCNC: 102 U/L (ref 73–393)
LYMPHOCYTES # BLD: 34.5 % (ref 8–40)
MCH RBC QN AUTO: 29.2 PG (ref 25.7–33.7)
MCHC RBC AUTO-ENTMCNC: 33.6 G/DL (ref 32–36)
MCV RBC: 86.8 FL (ref 80–96)
MONOCYTES # BLD AUTO: 7.9 % (ref 3.8–10.2)
NEUTROPHILS # BLD: 56 % (ref 42.8–82.8)
PLATELET # BLD AUTO: 97 K/MM3 (ref 134–434)
PLATELET BLD QL SMEAR: (no result)
PMV BLD: 8.7 FL (ref 7.5–11.1)
POTASSIUM SERPLBLD-SCNC: 3.2 MMOL/L (ref 3.5–5.1)
PROT SERPL-MCNC: 7.1 G/DL (ref 6.4–8.2)
RBC # BLD AUTO: 3.66 M/MM3 (ref 3.6–5.2)
SODIUM SERPL-SCNC: 138 MMOL/L (ref 136–145)
WBC # BLD AUTO: 4.6 K/MM3 (ref 4–10)

## 2020-03-08 RX ADMIN — ACETAMINOPHEN PRN MG: 325 TABLET ORAL at 14:25

## 2020-03-08 RX ADMIN — FUROSEMIDE SCH MG: 20 TABLET ORAL at 05:43

## 2020-03-08 RX ADMIN — POTASSIUM CHLORIDE SCH MLS/HR: 7.46 INJECTION, SOLUTION INTRAVENOUS at 14:25

## 2020-03-08 RX ADMIN — POTASSIUM CHLORIDE SCH MLS/HR: 7.46 INJECTION, SOLUTION INTRAVENOUS at 15:36

## 2020-03-08 RX ADMIN — SPIRONOLACTONE SCH MG: 25 TABLET, FILM COATED ORAL at 09:32

## 2020-03-08 RX ADMIN — ACETAMINOPHEN PRN MG: 325 TABLET ORAL at 21:50

## 2020-03-08 RX ADMIN — POLYETHYLENE GLYCOL 3350 SCH GM: 17 POWDER, FOR SOLUTION ORAL at 21:51

## 2020-03-08 RX ADMIN — FUROSEMIDE SCH MG: 20 TABLET ORAL at 14:25

## 2020-03-08 NOTE — PN
Progress Note, Physician


History of Present Illness: 





Pt w/ poor appetite





- Current Medication List


Current Medications: 


Active Medications





Acetaminophen (Tylenol -)  325 mg PO Q6H PRN


   PRN Reason: PAIN SCALE 1-5


   Last Admin: 03/08/20 21:50 Dose:  325 mg


   Documented by: 


Acetaminophen (Tylenol -)  325 mg PO Q6H PRN


   PRN Reason: PAIN SCALE 6-10


Furosemide (Lasix -)  20 mg PO BID@0600,1400 [facility]


   Last Admin: 03/08/20 14:25 Dose:  20 mg


   Documented by: 


Oxycodone HCl (Roxicodone -)  5 mg PO Q6H PRN


   PRN Reason: PAIN SCALE 6-10


   Last Admin: 03/08/20 21:50 Dose:  5 mg


   Documented by: 


Polyethylene Glycol (Miralax (For Daily Use) -)  17 gm PO BID [facility]


   Last Admin: 03/08/20 21:51 Dose:  17 gm


   Documented by: 


Spironolactone (Aldactone -)  100 mg PO DAILY [facility]


   Last Admin: 03/08/20 09:32 Dose:  100 mg


   Documented by: 











- Objective


Vital Signs: 


                                   Vital Signs











Temperature  98.4 F   03/08/20 21:00


 


Pulse Rate  78   03/08/20 21:00


 


Respiratory Rate  18   03/08/20 21:00


 


Blood Pressure  116/68   03/08/20 21:00


 


O2 Sat by Pulse Oximetry (%)  99   03/08/20 21:00











Eyes: Yes: Other (Lt conjunctival hemorrhage)


Cardiovascular: Yes: WNL, Regular Rate and Rhythm


Respiratory: Yes: WNL, Regular, CTA Bilaterally


Gastrointestinal: Yes: Ascites, Distention


Labs: 


                                    CBC, BMP





                                 03/08/20 07:18 





                                 03/08/20 07:18 





                                    INR, PTT











INR  1.44  (0.83-1.09)  H  03/06/20  10:40    














Problem List





- Problems


(1) Fecal impaction


Assessment/Plan: 


GI consult appreciated


Morphine dc'ed


Cont miralax


Code(s): K56.41 - FECAL IMPACTION   





(2) Ascites


Assessment/Plan: 


Peritoneal fluid cultures remain negative


IV antibxs dc'ed


Cont diuresis w/ lasix/spironolactone


Code(s): R18.8 - OTHER ASCITES   


Qualifiers: 


   Ascites type: other type   Qualified Code(s): R18.8 - Other ascites   





(3) Anemia


Assessment/Plan: 


H/H stable


Code(s): D64.9 - ANEMIA, UNSPECIFIED   





(4) Alcoholic cirrhosis


Code(s): K70.30 - ALCOHOLIC CIRRHOSIS OF LIVER WITHOUT ASCITES   





(5) Jaundice


Code(s): R17 - UNSPECIFIED JAUNDICE   





(6) S/P gastric bypass


Code(s): Z98.84 - BARIATRIC SURGERY STATUS

## 2020-03-08 NOTE — CON.ID
Consult


Consult Specialty:: infectious diseases


Referred by:: dr anderson


Reason for Consultation:: r/o sbp,abd pain





- History of Present Illness


Chief Complaint: abd pain


History of Present Illness: 





39F with Etoh cirrhosis presents with increasing abdominal distension for the 

past week culminating in abdominal pain with nausea, diarrhea and inability to 

eat. She is followed by the Liver Transplant Team at [facility], ? Dr. Bravo where she

had a paracentesis in 10/19. She also had a paracentesis at [facility] in . She 

reports that she last drank alcohol in . She is not sure whether or not she 

was treated for HBV. She denies IVDA or cocaine usage but has been tattooed. She

is s/p gastric bypass in  at [facility] complicated by infection and the need 

for serial dilations. She last had an EGD here with Dr Haile on 17 which 

revealed a patent gastric bypass and no varices. She denies ever having had UGI 

bleeding or encephalopathy but does have intermittent rectal bleeding. She 

cannot recall whether or not she ha ever had a colonoscopy. No FH of GI cancer 

but her GM had cirrhosis. Was hospitalized here  for similar presentation 

but acalculous cholecystitis could not be substantiated. Bilirubin was 7. 

Stopped her alcohol intake after that hospitalization. She has a left eye 

contusion that she attributes to being hit by a ball while playing billiards.   





- Past Medical History


Gastrointestinal: Yes: Other (s/p gastric bypass  [facility] with serial 

postop dilations)


Hepatobiliary: Yes: Cirrhosis (Alcoholic cirrhosis with ascites on [facility] Liver 

Transplant List), Hepatitis B (status to be determined, has core antibody)


...LMP: 19


...Pregnant: No


Infectious Disease: Yes: MRSA (history of)


Psych: Yes: Addictions (alcohol), Other (Etoh abuse)





- Past Surgical History


Past Surgical History: Yes: Appendectomy, Bariatric Surgery (laparoscopic 

gastric bypass  [place]), , Tubal Ligation, Upper Endoscopy





- Alcohol/Substance Use


Hx Alcohol Use: Yes (quit )


History of Substance Use: reports: None





- Smoking History


Smoking history: Former smoker


Have you smoked in the past 12 months: No


Aproximately how many cigarettes per day: 2


If you are a former smoker, when did you quit?: quit with alcohol 





- Social History


Usual Living Arrangement: With Significant Other


ADL: Independent


Occupation: disabled


History of Recent Travel: No





Home Medications





- Allergies


Allergies/Adverse Reactions: 


                                    Allergies











Allergy/AdvReac Type Severity Reaction Status Date / Time


 


No Known Allergies Allergy   Verified 19 10:12














- Home Medications


Home Medications: 


Ambulatory Orders





Mv-Mn/Iron/FA/Herbal/Digestive [Prenatal One Tablet] 1 tab PO DAILY 19 











Physical Exam


Vital Signs: 


                                   Vital Signs











Temperature  98.2 F   20 13:38


 


Pulse Rate  92 H  20 13:38


 


Respiratory Rate  18   20 13:38


 


Blood Pressure  117/75   20 13:38


 


O2 Sat by Pulse Oximetry (%)  99   20 09:00











Labs: 


                                    CBC, BMP





                                 20 07:18 





                                 20 07:18

## 2020-03-08 NOTE — PN.GI
GI Progress Note


Subjective: 





GI NOte: Peritoneal fluid has no growth so far and # of polyps argues against 

SBP. NO etiology for pain found other than the fact that Lupis has not moved 

her bowels in 5 days. Suspect she has fecal impaction leading to intestinal 

colic. I explained to her that narcotics will be stopped and Miralax started and

that her diet will be advanced





- Objective


Vital Signs: 


                                   Vital Signs











Temperature  98.0 F   20 10:00


 


Pulse Rate  76   20 10:00


 


Respiratory Rate  18   20 10:00


 


Blood Pressure  107/51 L  20 10:00


 


O2 Sat by Pulse Oximetry (%)  99   20 09:00








Microbiology





13 18:00   Arm - Left Forearm   Gram Stain - Final


13 18:00   Arm - Left Forearm   Wound Culture - Final


                               S Aureus





                                Laboratory Tests











  20





  10:40 10:40 10:40


 


WBC  6.0  


 


Hgb   


 


Plt Count  160  


 


PT with INR   17.10 H 


 


Potassium   


 


Total Bilirubin    4.5 H


 


Direct Bilirubin   


 


AST    124 H


 


ALT    40


 


Alkaline Phosphatase    177 H


 


Ammonia   


 


Total Amylase   


 


Lipase    95














  20





  10:40 07:18 07:18


 


WBC    4.6


 


Hgb    10.7


 


Plt Count   


 


PT with INR   


 


Potassium   3.2 L 


 


Total Bilirubin   3.6 H 


 


Direct Bilirubin   1.7 H 


 


AST   


 


ALT   


 


Alkaline Phosphatase   


 


Ammonia  51.30 H  


 


Total Amylase   63 


 


Lipase   102 











Constitutional: Calm


Eyes: Yes: Sclera Icterus


Gastrointestinal Inspection: Yes: Distention


...Auscultate: Yes: Normoactive Bowel Sounds


...Palpate: Yes: Soft, Other (nontender)


...Percussion: Yes: Tympanitic


Labs: 


                                    CBC, BMP





                                 20 07:18 





                                 20 07:18 





                                    INR, PTT











INR  1.44  (0.83-1.09)  H  20  10:40    














Assessment/Plan





Impression:


- Suspect intestinal colicky pain due to fecal impaction. A viral 

gastroenteritis remains another possible etiology but if her pain persists she 

will need a repeat EGD. 


- Jaundice, ascites and perisplenic varices related to her alcoholic cirrhosis. 


- Positive for HBC core but not surface antibody. Await viral replication asses

sment  


- Gastric bypass history


- History of hemorrhoidal bleeding





Plan: 


-- Stop morphine


-- Advance diet


-- Await final ascites cultures


-- Stool cultures not collected as diarrhea vanished


-- HBV PCR pending


-- PPI empirically


-- AFP


-- Continue spironolactone and lasix with daily weights 





  The [facility] GI Service will assume care tomorrow





Problem List





- Problems


(1) Gas pain


Code(s): R14.1 - GAS PAIN   





(2) Fecal impaction


Code(s): K56.41 - FECAL IMPACTION   





(3) Abdominal pain


Code(s): R10.9 - UNSPECIFIED ABDOMINAL PAIN   





(4) Cirrhosis with alcoholism


Code(s): K70.30 - ALCOHOLIC CIRRHOSIS OF LIVER WITHOUT ASCITES   





(5) Jaundice, hepatocellular


Code(s): K76.89 - OTHER SPECIFIED DISEASES OF LIVER   





(6) History of appendectomy


Code(s): Z90.49 - ACQUIRED ABSENCE OF OTHER SPECIFIED PARTS OF DIGESTIVE TRACT  







(7) Ascites


Code(s): R18.8 - OTHER ASCITES   


Qualifiers: 


   Ascites type: other type   Qualified Code(s): R18.8 - Other ascites   





(8) Rectal bleeding


Code(s): K62.5 - HEMORRHAGE OF ANUS AND RECTUM   





(9) S/P gastric bypass


Code(s): Z98.84 - BARIATRIC SURGERY STATUS   





(10) Status post repeat low transverse  section


Code(s): Z98.891 - HISTORY OF UTERINE SCAR FROM PREVIOUS SURGERY   





(11) Splenic varices


Code(s): I86.8 - VARICOSE VEINS OF OTHER SPECIFIED SITES   





(12) Constipation


Code(s): K59.00 - CONSTIPATION, UNSPECIFIED

## 2020-03-09 LAB
ALBUMIN MFR FLD ELPH: 0.9 G/DL
ALBUMIN SERPL-MCNC: 1.9 G/DL (ref 3.4–5)
ALP SERPL-CCNC: 130 U/L (ref 45–117)
ALT SERPL-CCNC: 29 U/L (ref 13–61)
ANION GAP SERPL CALC-SCNC: 5 MMOL/L (ref 8–16)
AST SERPL-CCNC: 83 U/L (ref 15–37)
BASOPHILS # BLD: 1 % (ref 0–2)
BILIRUB SERPL-MCNC: 2.4 MG/DL (ref 0.2–1)
BUN SERPL-MCNC: 7.4 MG/DL (ref 7–18)
CALCIUM SERPL-MCNC: 7.4 MG/DL (ref 8.5–10.1)
CHLORIDE SERPL-SCNC: 106 MMOL/L (ref 98–107)
CO2 SERPL-SCNC: 29 MMOL/L (ref 21–32)
CREAT SERPL-MCNC: 0.8 MG/DL (ref 0.55–1.3)
DEPRECATED RDW RBC AUTO: 23.4 % (ref 11.6–15.6)
EOSINOPHIL # BLD: 0.9 % (ref 0–4.5)
GLUCOSE SERPL-MCNC: 67 MG/DL (ref 74–106)
HCT VFR BLD CALC: 28.1 % (ref 32.4–45.2)
HGB BLD-MCNC: 9.4 GM/DL (ref 10.7–15.3)
LYMPHOCYTES # BLD: 38.8 % (ref 8–40)
MCH RBC QN AUTO: 29.1 PG (ref 25.7–33.7)
MCHC RBC AUTO-ENTMCNC: 33.5 G/DL (ref 32–36)
MCV RBC: 86.8 FL (ref 80–96)
MONOCYTES # BLD AUTO: 7.8 % (ref 3.8–10.2)
NEUTROPHILS # BLD: 51.5 % (ref 42.8–82.8)
PLATELET # BLD AUTO: 83 K/MM3 (ref 134–434)
PMV BLD: 9 FL (ref 7.5–11.1)
POTASSIUM SERPLBLD-SCNC: 3.5 MMOL/L (ref 3.5–5.1)
PROT SERPL-MCNC: 6.5 G/DL (ref 6.4–8.2)
RBC # BLD AUTO: 3.23 M/MM3 (ref 3.6–5.2)
SODIUM SERPL-SCNC: 140 MMOL/L (ref 136–145)
WBC # BLD AUTO: 4.1 K/MM3 (ref 4–10)

## 2020-03-09 RX ADMIN — SPIRONOLACTONE SCH MG: 25 TABLET, FILM COATED ORAL at 09:45

## 2020-03-09 RX ADMIN — KETOROLAC TROMETHAMINE PRN MG: 30 INJECTION INTRAMUSCULAR; INTRAVENOUS at 15:43

## 2020-03-09 RX ADMIN — KETOROLAC TROMETHAMINE PRN MG: 30 INJECTION INTRAMUSCULAR; INTRAVENOUS at 21:35

## 2020-03-09 RX ADMIN — FUROSEMIDE SCH MG: 20 TABLET ORAL at 14:00

## 2020-03-09 RX ADMIN — FUROSEMIDE SCH MG: 20 TABLET ORAL at 05:45

## 2020-03-09 RX ADMIN — ACETAMINOPHEN PRN MG: 325 TABLET ORAL at 09:47

## 2020-03-09 RX ADMIN — POLYETHYLENE GLYCOL 3350 SCH GM: 17 POWDER, FOR SOLUTION ORAL at 21:32

## 2020-03-09 RX ADMIN — ACETAMINOPHEN PRN MG: 325 TABLET ORAL at 03:54

## 2020-03-09 RX ADMIN — POLYETHYLENE GLYCOL 3350 SCH GM: 17 POWDER, FOR SOLUTION ORAL at 09:45

## 2020-03-09 NOTE — PN
Progress Note, Physician


History of Present Illness: 





stable


still with some pain


all cx reports noted





- Current Medication List


Current Medications: 


Active Medications





Acetaminophen (Tylenol -)  325 mg PO Q6H PRN


   PRN Reason: PAIN SCALE 1-5


   Last Admin: 20 03:54 Dose:  325 mg


   Documented by: 


Acetaminophen (Tylenol -)  325 mg PO Q6H PRN


   PRN Reason: PAIN SCALE 6-10


   Last Admin: 20 09:47 Dose:  325 mg


   Documented by: 


Furosemide (Lasix -)  20 mg PO BID@0600,1400 [facility]


   Last Admin: 20 05:45 Dose:  20 mg


   Documented by: 


Oxycodone HCl (Roxicodone -)  5 mg PO Q6H PRN


   PRN Reason: PAIN SCALE 6-10


   Last Admin: 20 09:46 Dose:  5 mg


   Documented by: 


Polyethylene Glycol (Miralax (For Daily Use) -)  17 gm PO BID [facility]


   Last Admin: 20 09:45 Dose:  17 gm


   Documented by: 


Spironolactone (Aldactone -)  100 mg PO DAILY [facility]


   Last Admin: 20 09:45 Dose:  100 mg


   Documented by: 











- Objective


Vital Signs: 


                                   Vital Signs











Temperature  98.0 F   20 06:00


 


Pulse Rate  70   20 06:00


 


Respiratory Rate  18   20 06:00


 


Blood Pressure  103/66   20 06:00


 


O2 Sat by Pulse Oximetry (%)  99   20 21:00











Constitutional: Yes: Calm, Mild Distress


Cardiovascular: Yes: S1, S2


Respiratory: Yes: Regular, CTA Bilaterally


Gastrointestinal: Yes: Normal Bowel Sounds, Soft, Ascites


Musculoskeletal: Yes: WNL


Neurological: Yes: Alert, Oriented


Labs: 


                                    CBC, BMP





                                 20 07:35 





                                 20 07:35 





                                    INR, PTT











INR  1.44  (0.83-1.09)  H  20  10:40    














Assessment/Plan





Problem List





- Problems


(1) Gas pain


Code(s): R14.1 - GAS PAIN   





(2) Fecal impaction


Code(s): K56.41 - FECAL IMPACTION   





(3) Abdominal pain


Code(s): R10.9 - UNSPECIFIED ABDOMINAL PAIN   





(4) Cirrhosis with alcoholism


Code(s): K70.30 - ALCOHOLIC CIRRHOSIS OF LIVER WITHOUT ASCITES   





(5) Jaundice, hepatocellular


Code(s): K76.89 - OTHER SPECIFIED DISEASES OF LIVER   





(6) History of appendectomy


Code(s): Z90.49 - ACQUIRED ABSENCE OF OTHER SPECIFIED PARTS OF DIGESTIVE TRACT  







(7) Ascites


Code(s): R18.8 - OTHER ASCITES   


Qualifiers: 


   Ascites type: other type   Qualified Code(s): R18.8 - Other ascites   





(8) Rectal bleeding


Code(s): K62.5 - HEMORRHAGE OF ANUS AND RECTUM   





(9) S/P gastric bypass


Code(s): Z98.84 - BARIATRIC SURGERY STATUS   





(10) Status post repeat low transverse  section


Code(s): Z98.891 - HISTORY OF UTERINE SCAR FROM PREVIOUS SURGERY   





(11) Splenic varices


Code(s): I86.8 - VARICOSE VEINS OF OTHER SPECIFIED SITES   





(12) Constipation


Code(s): K59.00 - CONSTIPATION, UNSPECIFIED   





plan


continue current mgmt


no abx


cx reports noted


rest as per the team

## 2020-03-09 NOTE — PN
Progress Note (short form)





- Note


Progress Note: 





GI follow up





Patient seen and examined


Reports abdominal pain and growth of abd girth since she has been here


Reports is on spironolactone/lasix 100/40 at home and that helps LE edema but 

abdominal ascites always recurs


Sees fellow Dr. Nguyen/attending Dr. Bravo - [facility] hepatology





                                   Vital Signs











Temp  98.0 F   03/09/20 06:00


 


Pulse  70   03/09/20 06:00


 


Resp  18   03/09/20 06:00


 


BP  103/66   03/09/20 06:00


 


Pulse Ox  99   03/08/20 21:00





NAD


L eye subconj hemorrhage


Abd soft, diffuse ttp, distended but not tense





                                    CBC, BMP





                                 03/09/20 07:35 





                                 03/09/20 07:35 





                                  Hepatic Panel











Total Bilirubin  2.4 mg/dL (0.2-1)  H  03/09/20  07:35    


 


Direct Bilirubin  1.7 mg/dL (0.0-0.2)  H  03/08/20  07:18    


 


AST  83 U/L (15-37)  H  03/09/20  07:35    


 


ALT  29 U/L (13-61)   03/09/20  07:35    


 


Alkaline Phosphatase  130 U/L ()  H  03/09/20  07:35    


 


Albumin  1.9 g/dl (3.4-5.0)  L  03/09/20  07:35    








                                    INR, PTT











INR  1.44  (0.83-1.09)  H  03/06/20  10:40    











Impression: Ascites is likely causing discomfort. Now that negative for SBP, wo

uld perform a therapeutic tap.


Should follow up with [facility] liver re diuretic management and potentially 

serial taps


Low salt diet

## 2020-03-09 NOTE — PN
Progress Note, Physician





- Current Medication List


Current Medications: 


Active Medications





Acetaminophen (Tylenol -)  325 mg PO Q6H PRN


   PRN Reason: PAIN SCALE 1-3


   Last Admin: 03/09/20 03:54 Dose:  325 mg


   Documented by: 


Acetaminophen (Tylenol -)  325 mg PO Q6H PRN


   PRN Reason: PAIN SCALE 6-10


   Last Admin: 03/09/20 09:47 Dose:  325 mg


   Documented by: 


Furosemide (Lasix -)  20 mg PO BID@0600,1400 [facility]


   Last Admin: 03/09/20 14:00 Dose:  20 mg


   Documented by: 


Ketorolac Tromethamine (Toradol Injection -)  30 mg IVPUSH Q6H PRN


   PRN Reason: PAIN 4-6


   Stop: 03/14/20 15:30


   Last Admin: 03/09/20 15:43 Dose:  30 mg


   Documented by: 


Polyethylene Glycol (Miralax (For Daily Use) -)  17 gm PO BID [facility]


   Last Admin: 03/09/20 09:45 Dose:  17 gm


   Documented by: 


Spironolactone (Aldactone -)  100 mg PO DAILY [facility]


   Last Admin: 03/09/20 09:45 Dose:  100 mg


   Documented by: 











- Objective


Vital Signs: 


                                   Vital Signs











Temperature  97.6 F   03/09/20 18:53


 


Pulse Rate  79   03/09/20 18:53


 


Respiratory Rate  18   03/09/20 18:53


 


Blood Pressure  104/51 L  03/09/20 18:53


 


O2 Sat by Pulse Oximetry (%)  99   03/08/20 21:00











Constitutional: Yes: No Distress


HENT: Yes: Atraumatic


Neck: Yes: Supple


Cardiovascular: Yes: Regular Rate and Rhythm


Respiratory: Yes: CTA Bilaterally


Gastrointestinal: Yes: Ascites, Distention


Extremities: Yes: WNL


Neurological: Yes: Alert, Oriented


Labs: 


                                    CBC, BMP





                                 03/09/20 07:35 





                                 03/09/20 07:35 





                                    INR, PTT











INR  1.44  (0.83-1.09)  H  03/06/20  10:40    














Problem List





- Problems


(1) Alcoholic cirrhosis


Code(s): K70.30 - ALCOHOLIC CIRRHOSIS OF LIVER WITHOUT ASCITES   





(2) Ascites


Assessment/Plan: 


GI NOTE REVIEWED


FOR therapeutic tap


Code(s): R18.8 - OTHER ASCITES   


Qualifiers: 


   Ascites type: other type   Qualified Code(s): R18.8 - Other ascites   





(3) S/P gastric bypass


Code(s): Z98.84 - BARIATRIC SURGERY STATUS   





Assessment/Plan





COVERING FOR DR MERINO TODAY

## 2020-03-10 LAB
MACROPHAGES NFR PLR MANUAL: 50 %
MESOTHL CELL NFR PLR MANUAL: 11 %
MONOCYTES NFR PLR MANUAL: 2 %
NUC CELL # FLD: 189 /MM3

## 2020-03-10 PROCEDURE — 0W9G3ZX DRAINAGE OF PERITONEAL CAVITY, PERCUTANEOUS APPROACH, DIAGNOSTIC: ICD-10-PCS | Performed by: RADIOLOGY

## 2020-03-10 RX ADMIN — FUROSEMIDE SCH MG: 20 TABLET ORAL at 05:17

## 2020-03-10 RX ADMIN — KETOROLAC TROMETHAMINE PRN MG: 30 INJECTION INTRAMUSCULAR; INTRAVENOUS at 03:30

## 2020-03-10 RX ADMIN — KETOROLAC TROMETHAMINE PRN MG: 30 INJECTION INTRAMUSCULAR; INTRAVENOUS at 15:53

## 2020-03-10 RX ADMIN — KETOROLAC TROMETHAMINE PRN MG: 30 INJECTION INTRAMUSCULAR; INTRAVENOUS at 10:15

## 2020-03-10 RX ADMIN — KETOROLAC TROMETHAMINE PRN MG: 30 INJECTION INTRAMUSCULAR; INTRAVENOUS at 22:23

## 2020-03-10 RX ADMIN — FUROSEMIDE SCH MG: 20 TABLET ORAL at 14:43

## 2020-03-10 RX ADMIN — POLYETHYLENE GLYCOL 3350 SCH GM: 17 POWDER, FOR SOLUTION ORAL at 22:24

## 2020-03-10 RX ADMIN — SPIRONOLACTONE SCH MG: 25 TABLET, FILM COATED ORAL at 10:15

## 2020-03-10 RX ADMIN — POLYETHYLENE GLYCOL 3350 SCH GM: 17 POWDER, FOR SOLUTION ORAL at 10:14

## 2020-03-10 NOTE — PN
Progress Note, Physician


History of Present Illness: 





stable


no new issues


all results noted





- Current Medication List


Current Medications: 


Active Medications





Acetaminophen (Tylenol -)  325 mg PO Q6H PRN


   PRN Reason: PAIN SCALE 1-3


   Last Admin: 20 03:54 Dose:  325 mg


   Documented by: 


Acetaminophen (Tylenol -)  325 mg PO Q6H PRN


   PRN Reason: PAIN SCALE 6-10


   Last Admin: 20 09:47 Dose:  325 mg


   Documented by: 


Furosemide (Lasix -)  20 mg PO BID@0600,1400 [facility]


   Last Admin: 03/10/20 05:17 Dose:  20 mg


   Documented by: 


Ketorolac Tromethamine (Toradol Injection -)  30 mg IVPUSH Q6H PRN


   PRN Reason: PAIN 4-6


   Stop: 20 15:30


   Last Admin: 03/10/20 10:15 Dose:  30 mg


   Documented by: 


Polyethylene Glycol (Miralax (For Daily Use) -)  17 gm PO BID [facility]


   Last Admin: 03/10/20 10:14 Dose:  17 gm


   Documented by: 


Spironolactone (Aldactone -)  100 mg PO DAILY [facility]


   Last Admin: 03/10/20 10:15 Dose:  100 mg


   Documented by: 











- Objective


Vital Signs: 


                                   Vital Signs











Temperature  97.7 F   03/10/20 06:17


 


Pulse Rate  58 L  03/10/20 06:17


 


Respiratory Rate  17   03/10/20 06:17


 


Blood Pressure  87/49 L  03/10/20 06:17


 


O2 Sat by Pulse Oximetry (%)  99   20 21:00











Constitutional: Yes: No Distress, Calm


Cardiovascular: Yes: S1, S2


Respiratory: Yes: Regular, CTA Bilaterally


Gastrointestinal: Yes: Normal Bowel Sounds, Soft


Musculoskeletal: Yes: WNL


Extremities: Yes: WNL


Neurological: Yes: Alert, Oriented


Psychiatric: Yes: Alert, Oriented


Labs: 


                                    CBC, BMP





                                 20 07:35 





                                 20 07:35 





                                    INR, PTT











INR  1.44  (0.83-1.09)  H  20  10:40    














Assessment/Plan





Problem List





- Problems


(1) Gas pain


Code(s): R14.1 - GAS PAIN   





(2) Fecal impaction


Code(s): K56.41 - FECAL IMPACTION   





(3) Abdominal pain


Code(s): R10.9 - UNSPECIFIED ABDOMINAL PAIN   





(4) Cirrhosis with alcoholism


Code(s): K70.30 - ALCOHOLIC CIRRHOSIS OF LIVER WITHOUT ASCITES   





(5) Jaundice, hepatocellular


Code(s): K76.89 - OTHER SPECIFIED DISEASES OF LIVER   





(6) History of appendectomy


Code(s): Z90.49 - ACQUIRED ABSENCE OF OTHER SPECIFIED PARTS OF DIGESTIVE TRACT  







(7) Ascites


Code(s): R18.8 - OTHER ASCITES   


Qualifiers: 


   Ascites type: other type   Qualified Code(s): R18.8 - Other ascites   





(8) Rectal bleeding


Code(s): K62.5 - HEMORRHAGE OF ANUS AND RECTUM   





(9) S/P gastric bypass


Code(s): Z98.84 - BARIATRIC SURGERY STATUS   





(10) Status post repeat low transverse  section


Code(s): Z98.891 - HISTORY OF UTERINE SCAR FROM PREVIOUS SURGERY   





(11) Splenic varices


Code(s): I86.8 - VARICOSE VEINS OF OTHER SPECIFIED SITES   





(12) Constipation


Code(s): K59.00 - CONSTIPATION, UNSPECIFIED   








all cx results noted


no abx


continue supportive treatment

## 2020-03-10 NOTE — PN
Progress Note, Physician


History of Present Illness: 





stable





- Current Medication List


Current Medications: 


Active Medications





Acetaminophen (Tylenol -)  325 mg PO Q6H PRN


   PRN Reason: PAIN SCALE 1-3


   Last Admin: 03/09/20 03:54 Dose:  325 mg


   Documented by: 


Acetaminophen (Tylenol -)  325 mg PO Q6H PRN


   PRN Reason: PAIN SCALE 6-10


   Last Admin: 03/09/20 09:47 Dose:  325 mg


   Documented by: 


Furosemide (Lasix -)  20 mg PO BID@0600,1400 [facility]


   Last Admin: 03/10/20 14:43 Dose:  20 mg


   Documented by: 


Ketorolac Tromethamine (Toradol Injection -)  30 mg IVPUSH Q6H PRN


   PRN Reason: PAIN 4-6


   Stop: 03/14/20 15:30


   Last Admin: 03/10/20 15:53 Dose:  30 mg


   Documented by: 


Polyethylene Glycol (Miralax (For Daily Use) -)  17 gm PO BID [facility]


   Last Admin: 03/10/20 10:14 Dose:  17 gm


   Documented by: 


Spironolactone (Aldactone -)  100 mg PO DAILY [facility]


   Last Admin: 03/10/20 10:15 Dose:  100 mg


   Documented by: 











- Objective


Vital Signs: 


                                   Vital Signs











Temperature  97.9 F   03/10/20 19:35


 


Pulse Rate  69   03/10/20 19:35


 


Respiratory Rate  18   03/10/20 14:15


 


Blood Pressure  103/44 L  03/10/20 19:35


 


O2 Sat by Pulse Oximetry (%)  99   03/09/20 21:00











Constitutional: Yes: No Distress


HENT: Yes: Atraumatic


Neck: Yes: Supple


Cardiovascular: Yes: Regular Rate and Rhythm


Respiratory: Yes: CTA Bilaterally


Gastrointestinal: Yes: Normal Bowel Sounds, Ascites (better)


Extremities: Yes: WNL


Neurological: Yes: Alert, Oriented


Labs: 


                                    CBC, BMP





                                 03/09/20 07:35 





                                 03/09/20 07:35 





                                    INR, PTT











INR  1.44  (0.83-1.09)  H  03/06/20  10:40    














Problem List





- Problems


(1) Alcoholic cirrhosis


Code(s): K70.30 - ALCOHOLIC CIRRHOSIS OF LIVER WITHOUT ASCITES   





(2) Ascites


Assessment/Plan: 


s/p paracentesis


took out 5 L 


Code(s): R18.8 - OTHER ASCITES   


Qualifiers: 


   Ascites type: other type   Qualified Code(s): R18.8 - Other ascites   





(3) S/P gastric bypass


Code(s): Z98.84 - BARIATRIC SURGERY STATUS   





Assessment/Plan





COVERING FOR DR MERINO TODAY..d/w her

## 2020-03-11 VITALS — DIASTOLIC BLOOD PRESSURE: 53 MMHG | TEMPERATURE: 97.8 F | SYSTOLIC BLOOD PRESSURE: 107 MMHG | HEART RATE: 70 BPM

## 2020-03-11 LAB
ALBUMIN MFR FLD ELPH: 1 G/DL
ALBUMIN SERPL-MCNC: 2 G/DL (ref 3.4–5)
ALP SERPL-CCNC: 141 U/L (ref 45–117)
ALT SERPL-CCNC: 26 U/L (ref 13–61)
ANION GAP SERPL CALC-SCNC: 6 MMOL/L (ref 8–16)
ANISOCYTOSIS BLD QL: (no result)
AST SERPL-CCNC: 78 U/L (ref 15–37)
BASOPHILS # BLD: 0.5 % (ref 0–2)
BILIRUB SERPL-MCNC: 2.2 MG/DL (ref 0.2–1)
BUN SERPL-MCNC: 20.9 MG/DL (ref 7–18)
CALCIUM SERPL-MCNC: 8.3 MG/DL (ref 8.5–10.1)
CHLORIDE SERPL-SCNC: 106 MMOL/L (ref 98–107)
CO2 SERPL-SCNC: 28 MMOL/L (ref 21–32)
CREAT SERPL-MCNC: 1.3 MG/DL (ref 0.55–1.3)
DEPRECATED RDW RBC AUTO: 24 % (ref 11.6–15.6)
EOSINOPHIL # BLD: 0.4 % (ref 0–4.5)
GLUCOSE SERPL-MCNC: 66 MG/DL (ref 74–106)
HCT VFR BLD CALC: 30.2 % (ref 32.4–45.2)
HGB BLD-MCNC: 10 GM/DL (ref 10.7–15.3)
LYMPHOCYTES # BLD: 36.5 % (ref 8–40)
MACROCYTES BLD QL: 0
MCH RBC QN AUTO: 28.9 PG (ref 25.7–33.7)
MCHC RBC AUTO-ENTMCNC: 33 G/DL (ref 32–36)
MCV RBC: 87.5 FL (ref 80–96)
MONOCYTES # BLD AUTO: 8.1 % (ref 3.8–10.2)
NEUTROPHILS # BLD: 54.5 % (ref 42.8–82.8)
PLATELET # BLD AUTO: 93 K/MM3 (ref 134–434)
PLATELET BLD QL SMEAR: (no result)
PMV BLD: 9.5 FL (ref 7.5–11.1)
POTASSIUM SERPLBLD-SCNC: 3.8 MMOL/L (ref 3.5–5.1)
PROT SERPL-MCNC: 6.6 G/DL (ref 6.4–8.2)
RBC # BLD AUTO: 3.45 M/MM3 (ref 3.6–5.2)
SODIUM SERPL-SCNC: 140 MMOL/L (ref 136–145)
TARGETS BLD QL SMEAR: (no result)
WBC # BLD AUTO: 5.7 K/MM3 (ref 4–10)

## 2020-03-11 RX ADMIN — SPIRONOLACTONE SCH: 25 TABLET, FILM COATED ORAL at 10:11

## 2020-03-11 RX ADMIN — KETOROLAC TROMETHAMINE PRN MG: 30 INJECTION INTRAMUSCULAR; INTRAVENOUS at 09:09

## 2020-03-11 RX ADMIN — FUROSEMIDE SCH MG: 20 TABLET ORAL at 06:07

## 2020-03-11 RX ADMIN — POLYETHYLENE GLYCOL 3350 SCH GM: 17 POWDER, FOR SOLUTION ORAL at 09:10

## 2020-03-11 RX ADMIN — FUROSEMIDE SCH MG: 20 TABLET ORAL at 14:18

## 2020-03-11 RX ADMIN — KETOROLAC TROMETHAMINE PRN MG: 30 INJECTION INTRAMUSCULAR; INTRAVENOUS at 04:04

## 2020-03-11 RX ADMIN — SPIRONOLACTONE SCH MG: 25 TABLET, FILM COATED ORAL at 09:09

## 2020-03-11 RX ADMIN — ACETAMINOPHEN PRN MG: 325 TABLET ORAL at 14:18

## 2020-03-11 NOTE — PN.GI
GI Progress Note


Subjective: 





Pt seen/examined at bedside, feeling better, abdominal pain improving s/p 

paracentesis yesterday, had 5L removed. Tolerating po, moving bowels had 2 brown

bms yesterday, no blood.





- Objective


Vital Signs: 


                                   Vital Signs











Temperature  98.1 F   03/11/20 07:25


 


Pulse Rate  68   03/11/20 07:25


 


Respiratory Rate  18   03/11/20 07:25


 


Blood Pressure  105/59 L  03/11/20 07:25


 


O2 Sat by Pulse Oximetry (%)  99   03/10/20 21:00











Constitutional: No Distress, Calm


Cardiovascular: Yes: WNL, Regular Rate and Rhythm


Respiratory: Yes: WNL, Regular, CTA Bilaterally


...Palpate: Yes: Other (Soft, mildly tender on palpation in upper abdomen, 

nondistended, no rebound, guarding or rigidity)


Edema: No


Labs: 


                                    CBC, BMP





                                 03/11/20 08:03 





                                 03/11/20 08:03 





                                    INR, PTT











INR  1.44  (0.83-1.09)  H  03/06/20  10:40    














Problem List





- Problems


(1) Alcoholic cirrhosis


Assessment/Plan: 


38yo female h/o etoh/cirrhosis, gastric bypass with recurrent ascites s/p 

paracentesis with 5L removed, no evidence of SBP, SAAG from 3/6 fluid studies 

>1.1. Follows with Dr. Bravo (hepatology) at [facility]. Colonoscopy in 7/2019 

at [facility] for diarrhea which was unremarkable, remote possible EGD, not available 

in [facility] records. MELD 16.





-Continue diuretic regimen lasix 40mg/aldactone 100mg daily for now


-2g NA diet


-Follow up remainder of fluid studies


-Repeat PT/INR


-Can consider EGD prior to discharge, tentatively tomorrow to assess for varices

as does not appear to have been done within the past few years


-Emphasized follow up with hepatology team at [facility] - appt scheduled on 

4/23/20 (had missed 2/3 appt) however could try to move up appt pending timing 

discharge 


-Continue HCC screening with US q6 months


Code(s): K70.30 - ALCOHOLIC CIRRHOSIS OF LIVER WITHOUT ASCITES

## 2020-03-11 NOTE — PATH
Cytology Non-Gynecological Report



Patient Name:  SANCHEZ QUIROGA

Accession #:  [id number]

Med. Rec. #:  [id number]                                                        

   /Age/Gender:  1980 (Age: 39) / F

Account:  [id number]                                                          

             Location: [address] [place] [address]/[facility]

Taken:  3/10/2020

Received:  3/10/2020

Reported:  3/11/2020

Physicians:  Nahum Wolff M.D.

  



Specimen(s) Received

A: ABDOMINAL FLUID 

B: ABDOMINAL FLUID 





Clinical History

Ascites







Final Diagnosis

A-B. ABDOMINAL FLUID, PARACENTESIS:

SATISFACTORY FOR EVALUATION.

NO MALIGNANT CELLS IDENTIFIED.

MESOTHELIAL CELLS, MACROPHAGES, AND LYMPHOCYTES PRESENT.





***Electronically Signed***

Chayito Ying M.D.





Gross Description

A.  Approximately 50 cc of yellow fluid received fixed in 50% alcohol. One

cytofunnel prepared and Pap stained. One cellblock prepared.



B.  Approximately 5000 cc of jennifer fluid received fresh. One cytofunnel prepared

and Pap stained. One cellblock prepared.

## 2020-03-11 NOTE — PN
Progress Note, Physician


History of Present Illness: 





stable


post paracentesis


feel better





- Current Medication List


Current Medications: 


Active Medications





Acetaminophen (Tylenol -)  325 mg PO Q6H PRN


   PRN Reason: PAIN SCALE 1-3


   Last Admin: 20 03:54 Dose:  325 mg


   Documented by: 


Acetaminophen (Tylenol -)  325 mg PO Q6H PRN


   PRN Reason: PAIN SCALE 6-10


   Last Admin: 20 09:47 Dose:  325 mg


   Documented by: 


Furosemide (Lasix -)  20 mg PO BID@0600,1400 Formerly [facility], 1928–1983


   Last Admin: 20 06:07 Dose:  20 mg


   Documented by: 


Ketorolac Tromethamine (Toradol Injection -)  30 mg IVPUSH Q6H PRN


   PRN Reason: PAIN 4-6


   Stop: 20 15:30


   Last Admin: 20 09:09 Dose:  30 mg


   Documented by: 


Polyethylene Glycol (Miralax (For Daily Use) -)  17 gm PO BID Former[facility], [facility]


   Last Admin: 20 09:10 Dose:  17 gm


   Documented by: 


Spironolactone (Aldactone -)  100 mg PO DAILY Former[facility], [facility]


   Last Admin: 20 10:11 Dose:  Not Given


   Documented by: 











- Objective


Vital Signs: 


                                   Vital Signs











Temperature  98.0 F   20 09:00


 


Pulse Rate  73   20 09:00


 


Respiratory Rate  20   20 09:00


 


Blood Pressure  98/52 L  20 09:00


 


O2 Sat by Pulse Oximetry (%)  95   20 10:00











Constitutional: Yes: No Distress, Calm


Cardiovascular: Yes: S1, S2


Respiratory: Yes: Regular, CTA Bilaterally


Gastrointestinal: Yes: Normal Bowel Sounds, Soft, Ascites


Musculoskeletal: Yes: WNL


Extremities: Yes: WNL


Neurological: Yes: Alert, Oriented


Psychiatric: Yes: Alert, Oriented


Labs: 


                                    CBC, BMP





                                 20 08:03 





                                 20 08:03 





                                    INR, PTT











INR  1.44  (0.83-1.09)  H  20  10:40    














Assessment/Plan





Problem List





- Problems


(1) Gas pain


Code(s): R14.1 - GAS PAIN   





(2) Fecal impaction


Code(s): K56.41 - FECAL IMPACTION   





(3) Abdominal pain


Code(s): R10.9 - UNSPECIFIED ABDOMINAL PAIN   





(4) Cirrhosis with alcoholism


Code(s): K70.30 - ALCOHOLIC CIRRHOSIS OF LIVER WITHOUT ASCITES   





(5) Jaundice, hepatocellular


Code(s): K76.89 - OTHER SPECIFIED DISEASES OF LIVER   





(6) History of appendectomy


Code(s): Z90.49 - ACQUIRED ABSENCE OF OTHER SPECIFIED PARTS OF DIGESTIVE TRACT  







(7) Ascites


Code(s): R18.8 - OTHER ASCITES   


Qualifiers: 


   Ascites type: other type   Qualified Code(s): R18.8 - Other ascites   





(8) Rectal bleeding


Code(s): K62.5 - HEMORRHAGE OF ANUS AND RECTUM   





(9) S/P gastric bypass


Code(s): Z98.84 - BARIATRIC SURGERY STATUS   





(10) Status post repeat low transverse  section


Code(s): Z98.891 - HISTORY OF UTERINE SCAR FROM PREVIOUS SURGERY   





(11) Splenic varices


Code(s): I86.8 - VARICOSE VEINS OF OTHER SPECIFIED SITES   





(12) Constipation


Code(s): K59.00 - CONSTIPATION, UNSPECIFIED   








await for cx reports


rest as per the team


continue to monitor

## 2020-07-22 ENCOUNTER — HOSPITAL ENCOUNTER (INPATIENT)
Dept: HOSPITAL 74 - JER | Age: 40
LOS: 5 days | Discharge: ANOTHER HOSPITAL | DRG: 432 | End: 2020-07-27
Attending: INTERNAL MEDICINE | Admitting: INTERNAL MEDICINE
Payer: COMMERCIAL

## 2020-07-22 VITALS — BODY MASS INDEX: 22.8 KG/M2

## 2020-07-22 DIAGNOSIS — Z98.84: ICD-10-CM

## 2020-07-22 DIAGNOSIS — E87.6: ICD-10-CM

## 2020-07-22 DIAGNOSIS — E80.6: ICD-10-CM

## 2020-07-22 DIAGNOSIS — K70.31: Primary | ICD-10-CM

## 2020-07-22 DIAGNOSIS — F10.20: ICD-10-CM

## 2020-07-22 DIAGNOSIS — K65.2: ICD-10-CM

## 2020-07-22 DIAGNOSIS — R10.84: ICD-10-CM

## 2020-07-22 DIAGNOSIS — B95.2: ICD-10-CM

## 2020-07-22 DIAGNOSIS — K64.9: ICD-10-CM

## 2020-07-22 DIAGNOSIS — R16.0: ICD-10-CM

## 2020-07-22 LAB
ALBUMIN SERPL-MCNC: 2.5 G/DL (ref 3.4–5)
ALP SERPL-CCNC: 160 U/L (ref 45–117)
ALT SERPL-CCNC: 26 U/L (ref 13–61)
ANION GAP SERPL CALC-SCNC: 7 MMOL/L (ref 8–16)
ANISOCYTOSIS BLD QL: (no result)
APPEARANCE UR: CLEAR
APTT BLD: 37.4 SECONDS (ref 25.2–36.5)
AST SERPL-CCNC: 81 U/L (ref 15–37)
BACTERIA # UR AUTO: 31 /UL (ref 0–1359)
BASOPHILS # BLD: 0.8 % (ref 0–2)
BILIRUB SERPL-MCNC: 5.2 MG/DL (ref 0.2–1)
BILIRUB UR STRIP.AUTO-MCNC: (no result) MG/DL
BUN SERPL-MCNC: 8.8 MG/DL (ref 7–18)
CALCIUM SERPL-MCNC: 8 MG/DL (ref 8.5–10.1)
CASTS URNS QL MICRO: 0 /UL (ref 0–3.1)
CHLORIDE SERPL-SCNC: 101 MMOL/L (ref 98–107)
CO2 SERPL-SCNC: 30 MMOL/L (ref 21–32)
COLOR UR: (no result)
CREAT SERPL-MCNC: 0.9 MG/DL (ref 0.55–1.3)
DEPRECATED RDW RBC AUTO: 26.7 % (ref 11.6–15.6)
EOSINOPHIL # BLD: 0.4 % (ref 0–4.5)
EPITH CASTS URNS QL MICRO: 13 /UL (ref 0–25.1)
GLUCOSE SERPL-MCNC: 82 MG/DL (ref 74–106)
HCT VFR BLD CALC: 31.2 % (ref 32.4–45.2)
HGB BLD-MCNC: 10.1 GM/DL (ref 10.7–15.3)
INR BLD: 1.46 (ref 0.83–1.09)
KETONES UR QL STRIP: NEGATIVE
LEUKOCYTE ESTERASE UR QL STRIP.AUTO: NEGATIVE
LYMPHOCYTES # BLD: 24.8 % (ref 8–40)
MACROCYTES BLD QL: (no result)
MCH RBC QN AUTO: 27.7 PG (ref 25.7–33.7)
MCHC RBC AUTO-ENTMCNC: 32.4 G/DL (ref 32–36)
MCV RBC: 85.4 FL (ref 80–96)
MONOCYTES # BLD AUTO: 6.6 % (ref 3.8–10.2)
NEUTROPHILS # BLD: 67.4 % (ref 42.8–82.8)
NITRITE UR QL STRIP: NEGATIVE
PH UR: 8.5 [PH] (ref 5–8)
PLATELET # BLD AUTO: 110 K/MM3 (ref 134–434)
PLATELET BLD QL SMEAR: (no result)
PMV BLD: 8.7 FL (ref 7.5–11.1)
POTASSIUM SERPLBLD-SCNC: 2.8 MMOL/L (ref 3.5–5.1)
PROT SERPL-MCNC: 7.5 G/DL (ref 6.4–8.2)
PROT UR QL STRIP: (no result)
PROT UR QL STRIP: (no result)
PT PNL PPP: 17.3 SEC (ref 9.7–13)
RBC # BLD AUTO: 3.66 M/MM3 (ref 3.6–5.2)
RBC # BLD AUTO: 559.7 /UL (ref 0–23.9)
ROULEAUX BLD QL SMEAR: (no result)
SODIUM SERPL-SCNC: 138 MMOL/L (ref 136–145)
SP GR UR: 1.01 (ref 1.01–1.03)
UROBILINOGEN UR STRIP-MCNC: 1 MG/DL (ref 0.2–1)
WBC # BLD AUTO: 5.1 K/MM3 (ref 4–10)
WBC # UR AUTO: 100 /UL (ref 0–25.8)
YEAST BUDDING URNS QL: (no result)

## 2020-07-22 PROCEDURE — U0003 INFECTIOUS AGENT DETECTION BY NUCLEIC ACID (DNA OR RNA); SEVERE ACUTE RESPIRATORY SYNDROME CORONAVIRUS 2 (SARS-COV-2) (CORONAVIRUS DISEASE [COVID-19]), AMPLIFIED PROBE TECHNIQUE, MAKING USE OF HIGH THROUGHPUT TECHNOLOGIES AS DESCRIBED BY CMS-2020-01-R: HCPCS

## 2020-07-22 RX ADMIN — POTASSIUM CHLORIDE SCH MLS/HR: 7.46 INJECTION, SOLUTION INTRAVENOUS at 21:14

## 2020-07-22 RX ADMIN — POTASSIUM CHLORIDE SCH MLS/HR: 7.46 INJECTION, SOLUTION INTRAVENOUS at 23:06

## 2020-07-22 RX ADMIN — POTASSIUM CHLORIDE SCH MLS/HR: 7.46 INJECTION, SOLUTION INTRAVENOUS at 22:09

## 2020-07-22 NOTE — PDOC
Rapid Medical Evaluation


Time Seen by Provider: 07/22/20 16:48


Medical Evaluation: 


                                    Allergies











Allergy/AdvReac Type Severity Reaction Status Date / Time


 


No Known Allergies Allergy   Verified 02/05/19 10:12











07/22/20 16:51


I have performed a brief in-person evaluation of this patient.





The patient presents with a chief complaint of: Worsening abd pain/distension 

since yesterday. No n/v/f or acute change in BM. H/o gastric bypass, ?HBV, ETOH 

cirrhosis, on liver tx list at [facility], gets paracentesis every 2 months, last 

drained 5/20 at [facility] and states she has been dealing w/ death in family and 

have not f/u with recent drainage





Pertinent physical exam findings:chronically ill maggie w/ significant 

ascites/distension on exam





I have ordered the following:labs





The patient will proceed to the ED for further evaluation.








**Discharge Disposition





- Diagnosis


Abdominal pain


Qualifiers:


 Abdominal location: generalized Qualified Code(s): R10.84 - Generalized 

abdominal pain





Ascites


Qualifiers:


 Ascites type: due to alcoholic hepatitis Qualified Code(s): K70.11 - Alcoholic 

hepatitis with ascites








- Referrals





- Patient Instructions





- Post Discharge Activity

## 2020-07-22 NOTE — PDOC
Documentation entered by Elvia Crisostomo SCRIBE, acting as scribe for 

Tim Latif MD.








Tim Latif MD:  This documentation has been prepared by the Andressa regan Nirvannie, SCRIBE, under my direction and personally reviewed by me in 

its entirety.  I confirm that the documentation accurately reflects all work, 

treatment, procedures, and medical decision making performed by me.  





Attending Attestation





- Resident


Resident Name: Ronan Cardenas





- ED Attending Attestation


I have performed the following: I have examined & evaluated the patient, The 

case was reviewed & discussed with the resident, I agree w/resident's findings &

plan, Exceptions are as noted





- HPI


HPI: 





07/22/20 17:55


The patient is a 40 year old female with a significant past medical history of 

alcohol cirrhosis (on transplant list, paracentesis Q2 Months, last drained 

5/20) who presents to the ED with 2 days of progressively worsening abdominal 

distention. Denies F/C. Denies N/V/constipation. Denies any significant pain but

endorses discomfort from being so distended.





Allergies: NKDA





- Physicial Exam


PE: 





07/22/20 18:14


See resident exam





- Medical Decision Making





07/22/20 18:14


40 F with abdominal distention, likely 2/2 reaccumulation of ascites.


- Labs


- Diagnostic paracentesis


- Admit





Discharge





- Discharge Information


Problems reviewed: Yes


Clinical Impression/Diagnosis: 


Abdominal pain


Qualifiers:


 Abdominal location: generalized Qualified Code(s): R10.84 - Generalized 

abdominal pain





Ascites


Qualifiers:


 Ascites type: due to alcoholic hepatitis Qualified Code(s): K70.11 - Alcoholic 

hepatitis with ascites





Condition: Stable


Disposition: TRANSFER ACUTE CARE/OTHER HOSP





- Follow up/Referral





- Patient Discharge Instructions





- Post Discharge Activity

## 2020-07-23 LAB
ALBUMIN SERPL-MCNC: 2.2 G/DL (ref 3.4–5)
ALP SERPL-CCNC: 148 U/L (ref 45–117)
ALT SERPL-CCNC: 25 U/L (ref 13–61)
ANION GAP SERPL CALC-SCNC: 8 MMOL/L (ref 8–16)
AST SERPL-CCNC: 69 U/L (ref 15–37)
BASOPHILS # BLD: 0.7 % (ref 0–2)
BILIRUB SERPL-MCNC: 3.5 MG/DL (ref 0.2–1)
BUN SERPL-MCNC: 9.4 MG/DL (ref 7–18)
CALCIUM SERPL-MCNC: 7.8 MG/DL (ref 8.5–10.1)
CHLORIDE SERPL-SCNC: 101 MMOL/L (ref 98–107)
CO2 SERPL-SCNC: 29 MMOL/L (ref 21–32)
CREAT SERPL-MCNC: 0.8 MG/DL (ref 0.55–1.3)
DEPRECATED RDW RBC AUTO: 26.4 % (ref 11.6–15.6)
EOSINOPHIL # BLD: 1.2 % (ref 0–4.5)
GLUCOSE SERPL-MCNC: 80 MG/DL (ref 74–106)
HCT VFR BLD CALC: 28.1 % (ref 32.4–45.2)
HGB BLD-MCNC: 9.3 GM/DL (ref 10.7–15.3)
LYMPHOCYTES # BLD: 22.3 % (ref 8–40)
MCH RBC QN AUTO: 28.1 PG (ref 25.7–33.7)
MCHC RBC AUTO-ENTMCNC: 33.1 G/DL (ref 32–36)
MCV RBC: 85 FL (ref 80–96)
MONOCYTES # BLD AUTO: 7.2 % (ref 3.8–10.2)
NEUTROPHILS # BLD: 68.6 % (ref 42.8–82.8)
PLATELET # BLD AUTO: 96 K/MM3 (ref 134–434)
PMV BLD: 8.6 FL (ref 7.5–11.1)
POTASSIUM SERPLBLD-SCNC: 3.3 MMOL/L (ref 3.5–5.1)
PROT SERPL-MCNC: 6.9 G/DL (ref 6.4–8.2)
RBC # BLD AUTO: 3.3 M/MM3 (ref 3.6–5.2)
SODIUM SERPL-SCNC: 137 MMOL/L (ref 136–145)
WBC # BLD AUTO: 5.4 K/MM3 (ref 4–10)

## 2020-07-23 PROCEDURE — 0W9G3ZX DRAINAGE OF PERITONEAL CAVITY, PERCUTANEOUS APPROACH, DIAGNOSTIC: ICD-10-PCS | Performed by: RADIOLOGY

## 2020-07-23 RX ADMIN — HEPARIN SODIUM SCH: 5000 INJECTION, SOLUTION INTRAVENOUS; SUBCUTANEOUS at 21:02

## 2020-07-23 RX ADMIN — FUROSEMIDE SCH MG: 20 TABLET ORAL at 06:35

## 2020-07-23 RX ADMIN — HEPARIN SODIUM SCH: 5000 INJECTION, SOLUTION INTRAVENOUS; SUBCUTANEOUS at 09:30

## 2020-07-23 RX ADMIN — POTASSIUM CHLORIDE SCH MLS/HR: 7.46 INJECTION, SOLUTION INTRAVENOUS at 22:30

## 2020-07-23 RX ADMIN — POTASSIUM CHLORIDE SCH MLS/HR: 7.46 INJECTION, SOLUTION INTRAVENOUS at 23:26

## 2020-07-23 RX ADMIN — POLYETHYLENE GLYCOL 3350 SCH: 17 POWDER, FOR SOLUTION ORAL at 10:45

## 2020-07-23 RX ADMIN — SPIRONOLACTONE SCH MG: 25 TABLET, FILM COATED ORAL at 09:43

## 2020-07-23 RX ADMIN — FUROSEMIDE SCH MG: 20 TABLET ORAL at 14:51

## 2020-07-23 RX ADMIN — MORPHINE SULFATE PRN MG: 2 INJECTION, SOLUTION INTRAMUSCULAR; INTRAVENOUS at 22:30

## 2020-07-23 NOTE — EKG
From: Gaviota Bradford  To: Yris Reyes NP  Sent: 1/20/2020 10:19 AM CST  Subject: Lab Test or Test Related Question    Hi,    I have been trying to find results for an old testosterone test that my  had done prior to 2011 when we were going for fertility work ups. He has no record of anything in his charts to compare current to. I know dr. Caldwell would recieve copies of everything and he is the one who also referred my  to the urologist at [facility]. Is there anyway you could check in my records to see if the results are there or in our fertility workups with the clinic here so we can have it for our primary to compare?    Thanks,  Gaviota    Test Reason : 

Blood Pressure : ***/*** mmHG

Vent. Rate : 060 BPM     Atrial Rate : 060 BPM

   P-R Int : 144 ms          QRS Dur : 090 ms

    QT Int : 500 ms       P-R-T Axes : 033 -01 024 degrees

   QTc Int : 500 ms

 

NORMAL SINUS RHYTHM

CANNOT RULE OUT ANTERIOR INFARCT , AGE UNDETERMINED

PROLONGED QT

ABNORMAL ECG

WHEN COMPARED WITH ECG OF 29-APR-2020 20:59,

VENT. RATE HAS DECREASED BY  46 BPM

Confirmed by AGUSTIN MOE, LYNN ([id number]) on 7/23/2020 12:13:07 PM

 

Referred By:             Confirmed By:LYNN VELEZ MD

## 2020-07-23 NOTE — HP
Admitting History and Physical





- Admission


History of Present Illness: 





The patient is a 40 year old female with a significant past medical history of 

alcohol cirrhosis (on transplant list, paracentesis Q2 Months, last drained 

) who presents to the ED with 2 days of progressively worsening abdominal 

distention. Denies F/C. Denies N/V/constipation. Denies any significant pain but

endorses discomfort from being so distended.








- Past Medical History


Gastrointestinal: Yes: Other (s/p gastric bypass  [facility] with serial 

postop dilations)


Hepatobiliary: Yes: Cirrhosis (Alcoholic cirrhosis with ascites on [facility] Liver 

Transplant List), Hepatitis B (status to be determined, has core antibody)


...LMP: 19


Heme/Onc: Yes: Anemia


Infectious Disease: Yes: MRSA (history of)


Psych: Yes: Addictions (alcohol)





- Past Surgical History


Past Surgical History: Yes: Appendectomy, Bariatric Surgery (laparoscopic alber

gavi bypass  [place]), , Tubal Ligation, Upper Endoscopy





- Smoking History


Smoking history: Never smoked


Have you smoked in the past 12 months: No


Aproximately how many cigarettes per day: 2


If you are a former smoker, when did you quit?: quit with alcohol 





- Alcohol/Substance Use


Hx Alcohol Use: Yes (quit )


History of Substance Use: reports: None





- Social History


ADL: Independent


Occupation: disabled


History of Recent Travel: No





Home Medications





- Allergies


Allergies/Adverse Reactions: 


                                    Allergies











Allergy/AdvReac Type Severity Reaction Status Date / Time


 


No Known Allergies Allergy   Verified 20 16:53














- Home Medications


Home Medications: 


Ambulatory Orders





Mv-Mn/Iron/FA/Herbal/Digestive [Prenatal One Tablet] 1 tab PO DAILY 19 


Furosemide [Lasix] 20 mg PO BID #60 tablet 20 


Polyethylene Glycol 3350 [Miralax 119 gm Btl -] 17 gm PO BID  bottle 20 


Polyethylene Glycol 3350 [Miralax 119 gm Btl -] 17 gm PO DAILY #1 bottle 

20 


Spironolactone 100 gm MC DAILY #30 powder 20 


Furosemide [Lasix -] 20 mg PO BID@0600,1400 #60 tablet 20 


Spironolactone [Aldactone -] 100 mg PO DAILY #30 tablet 20 











Review of Systems





- Review of Systems


Constitutional: reports: Loss of Appetite, Malaise


Eyes: reports: No Symptoms


HENT: reports: No Symptoms


Neck: reports: No Symptoms


Cardiovascular: reports: No Symptoms


Respiratory: reports: No Symptoms


Gastrointestinal: reports: Abdominal Pain


Genitourinary: reports: No Symptoms





Physical Examination


Vital Signs: 


                                   Vital Signs











Temperature  98.4 F   20 14:00


 


Pulse Rate  67   20 14:00


 


Respiratory Rate  20 14:00


 


Blood Pressure  106/52 L  20 14:00


 


O2 Sat by Pulse Oximetry (%)  99   20 14:00











Constitutional: Yes: No Distress


HENT: Yes: WNL


Neck: Yes: WNL, Supple


Cardiovascular: Yes: WNL, Regular Rate and Rhythm


Respiratory: Yes: WNL, Regular, CTA Bilaterally


Gastrointestinal: Yes: Normal Bowel Sounds, Distention


Extremities: Yes: WNL


Edema: No


Neurological: Yes: WNL, Alert, Oriented


...Motor Strength: WNL


Labs: 


                                    CBC, BMP





                                 20 07:40 





                                 20 07:40 











Problem List





- Problems


(1) Ascites


Assessment/Plan: 


Due to cirrhosis


Paracentesis


Code(s): R18.8 - OTHER ASCITES   


Qualifiers: 


   Ascites type: other type   Qualified Code(s): R18.8 - Other ascites   





(2) Alcoholic cirrhosis


Assessment/Plan: 


Long d/w pt about need for close f/u. Pt states that she is followed at 

[facility] however it is unclear if she is on a transplant list?


Code(s): K70.30 - ALCOHOLIC CIRRHOSIS OF LIVER WITHOUT ASCITES   


Qualifiers: 


   Ascites presence: with ascites   Qualified Code(s): K70.31 - Alcoholic 

cirrhosis of liver with ascites   





(3) Anemia


Code(s): D64.9 - ANEMIA, UNSPECIFIED   





(4) Hyperbilirubinemia


Assessment/Plan: 


Check labs in am


Code(s): E80.6 - OTHER DISORDERS OF BILIRUBIN METABOLISM   





(5) S/P gastric bypass


Code(s): Z98.84 - BARIATRIC SURGERY STATUS

## 2020-07-24 LAB
ALBUMIN SERPL-MCNC: 2 G/DL (ref 3.4–5)
ALP SERPL-CCNC: 143 U/L (ref 45–117)
ALT SERPL-CCNC: 22 U/L (ref 13–61)
ANION GAP SERPL CALC-SCNC: 8 MMOL/L (ref 8–16)
AST SERPL-CCNC: 73 U/L (ref 15–37)
BASOPHILS # BLD: 0.8 % (ref 0–2)
BILIRUB SERPL-MCNC: 3.3 MG/DL (ref 0.2–1)
BUN SERPL-MCNC: 9.2 MG/DL (ref 7–18)
CALCIUM SERPL-MCNC: 7.5 MG/DL (ref 8.5–10.1)
CHLORIDE SERPL-SCNC: 104 MMOL/L (ref 98–107)
CO2 SERPL-SCNC: 27 MMOL/L (ref 21–32)
CREAT SERPL-MCNC: 0.8 MG/DL (ref 0.55–1.3)
DEPRECATED RDW RBC AUTO: 26.4 % (ref 11.6–15.6)
EOSINOPHIL # BLD: 0.8 % (ref 0–4.5)
GLUCOSE SERPL-MCNC: 76 MG/DL (ref 74–106)
HCT VFR BLD CALC: 28.5 % (ref 32.4–45.2)
HGB BLD-MCNC: 9.2 GM/DL (ref 10.7–15.3)
LYMPHOCYTES # BLD: 29.6 % (ref 8–40)
MCH RBC QN AUTO: 27.5 PG (ref 25.7–33.7)
MCHC RBC AUTO-ENTMCNC: 32.4 G/DL (ref 32–36)
MCV RBC: 84.9 FL (ref 80–96)
MONOCYTES # BLD AUTO: 6.4 % (ref 3.8–10.2)
NEUTROPHILS # BLD: 62.4 % (ref 42.8–82.8)
PLATELET # BLD AUTO: 101 K/MM3 (ref 134–434)
PMV BLD: 8.8 FL (ref 7.5–11.1)
POTASSIUM SERPLBLD-SCNC: 3.3 MMOL/L (ref 3.5–5.1)
PROT SERPL-MCNC: 6.3 G/DL (ref 6.4–8.2)
RBC # BLD AUTO: 3.36 M/MM3 (ref 3.6–5.2)
SODIUM SERPL-SCNC: 139 MMOL/L (ref 136–145)
WBC # BLD AUTO: 5.5 K/MM3 (ref 4–10)

## 2020-07-24 RX ADMIN — HEPARIN SODIUM SCH UNIT: 5000 INJECTION, SOLUTION INTRAVENOUS; SUBCUTANEOUS at 09:26

## 2020-07-24 RX ADMIN — POLYETHYLENE GLYCOL 3350 SCH: 17 POWDER, FOR SOLUTION ORAL at 09:35

## 2020-07-24 RX ADMIN — SPIRONOLACTONE SCH: 25 TABLET, FILM COATED ORAL at 09:27

## 2020-07-24 RX ADMIN — FUROSEMIDE SCH MG: 20 TABLET ORAL at 14:11

## 2020-07-24 RX ADMIN — HEPARIN SODIUM SCH UNIT: 5000 INJECTION, SOLUTION INTRAVENOUS; SUBCUTANEOUS at 21:01

## 2020-07-24 RX ADMIN — CEFTRIAXONE SCH MLS/HR: 1 INJECTION, POWDER, FOR SOLUTION INTRAMUSCULAR; INTRAVENOUS at 18:48

## 2020-07-24 RX ADMIN — FUROSEMIDE SCH MG: 20 TABLET ORAL at 06:25

## 2020-07-24 RX ADMIN — MORPHINE SULFATE PRN MG: 2 INJECTION, SOLUTION INTRAMUSCULAR; INTRAVENOUS at 09:27

## 2020-07-24 RX ADMIN — MORPHINE SULFATE PRN MG: 2 INJECTION, SOLUTION INTRAMUSCULAR; INTRAVENOUS at 17:20

## 2020-07-24 RX ADMIN — POTASSIUM CHLORIDE SCH MLS/HR: 7.46 INJECTION, SOLUTION INTRAVENOUS at 01:21

## 2020-07-24 NOTE — CON.GI
Consult


Consult Specialty:: GI


Referred by:: Dr. Lemons


Reason for Consultation:: Abdominal pain and ascites





- History of Present Illness


Chief Complaint: Abdominal pain


History of Present Illness: 


39F with Etoh cirrhosis presents with increasing abdominal distension for the 

past few weeks.  She is apoor historian and has even worse medical follow-up/  

She states that she was last seem at [facility] in May "to have fluid removed".  She 

believes that she has seen a "kidney doctor there and may have seen liver 

specialist but could not clearly tell me this or give me a name. On past 

admissions she stated being followed by the Liver Transplant Team at [facility], ? Dr. Bravo.  She states she had a paracentesis there in 10/19. She also had a 

paracentesis at [facility] in . She reports that she last drank alcohol "a while 

ago".  She has been non complaint with diuretic therapy in the past.  She is not

sure whether or not she was treated for HBV. She denies IVDA or cocaine usage 

but has been tattooed. She is s/p gastric bypass in  at [facility] complicated

by infection and the need for serial dilations. She last had an EGD here with Dr Haile on 17 which revealed a patent gastric bypass and no varices.  She 

believes that she had an EGD at [facility]  but could not remember the result.  She

was last at [facility] - for ascites. At that time, no SBP was noted on 

paracentesis, had unrevealing (aside from cirrhosis changes) triple phase CT 

scan A/P for continued abdominal pain , Advised need for continued follow-up at 

[facility] for continued management of her volume status / need for serial

paracenteses / eventual TIPS evaluation if needed. At that time she stated that 

she believed that she was listed for transplant there, however based in the 

vague answers she gives regarding follow-up, this is in doubt. She has a doctor 

on [facility] that she believes is affiliated with [facility], however she cannot 

pronounce her name, and was last seen by her . She states that she has been 

complaint with her diuretic therapy. She denies ever having had upper GI 

bleeding or encephalopathic. She cannot recall whether or not she ha ever had a 

colonoscopy. No FH of GI cancer but her GM had cirrhosis. Was hospitalized here 

 for similar presentation but acalculous cholecystitis could not be 

substantiated.  She was admitted yesterday, underwent 4L paracentesis, however 

fluid studies were not ordered. GI called this afternoon for evaluation.       








- History Source


History Provided By: Patient, Medical Record


Limitations to Obtaining History: Poor Historian





- Past Medical History


Gastrointestinal: Yes: Other (s/p gastric bypass  [facility] with serial 

postop dilations)


Hepatobiliary: Yes: Cirrhosis (Alcoholic cirrhosis with ascites on [facility] Liver 

Transplant List), Hepatitis B (status to be determined, has core antibody)


...LMP: 19


Infectious Disease: Yes: MRSA (history of)


Psych: Yes: Addictions (alcohol)





- Past Surgical History


Past Surgical History: Yes: Appendectomy, Bariatric Surgery (laparoscopic 

gastric bypass  [place]), , Tubal Ligation, Upper Endoscopy





- Alcohol/Substance Use


Hx Alcohol Use: Yes


History of Substance Use: reports: None





- Smoking History


Smoking history: Never smoked


Have you smoked in the past 12 months: No


Aproximately how many cigarettes per day: 2





- Social History


Usual Living Arrangement: With Significant Other


ADL: Independent


Occupation: disabled


History of Recent Travel: No





Home Medications





- Allergies


Allergies/Adverse Reactions: 


                                    Allergies











Allergy/AdvReac Type Severity Reaction Status Date / Time


 


No Known Allergies Allergy   Verified 20 16:53














- Home Medications


Home Medications: 


Ambulatory Orders





Mv-Mn/Iron/FA/Herbal/Digestive [Prenatal One Tablet] 1 tab PO DAILY 19 


Furosemide [Lasix] 20 mg PO BID #60 tablet 20 


Polyethylene Glycol 3350 [Miralax 119 gm Btl -] 17 gm PO BID  bottle 20 


Polyethylene Glycol 3350 [Miralax 119 gm Btl -] 17 gm PO DAILY #1 bottle 

20 


Spironolactone 100 gm MC DAILY #30 powder 20 


Furosemide [Lasix -] 20 mg PO BID@0600,1400 #60 tablet 20 


Spironolactone [Aldactone -] 100 mg PO DAILY #30 tablet 20 











Review of Systems





- Review of Systems


Constitutional: denies: Chills, Fever


Gastrointestinal: reports: Abdominal Pain, Bloating.  denies: Constipation, 

Diarrhea, Dysphagia, Indigestion, Melena, Nausea, Rectal Bleeding, Vomiting, 

Vomiting Blood





Physical Exam-GI


Vital Signs: 


                                   Vital Signs











Temperature  98.1 F   20 09:39


 


Pulse Rate  85   20 13:00


 


Respiratory Rate  19   20 13:00


 


Blood Pressure  104/60   20 13:00


 


O2 Sat by Pulse Oximetry (%)  99   20 13:00











Constitutional: Yes: Calm


Eyes: No: Sclera Icterus


Cardiovascular: Yes: Regular Rate and Rhythm


Respiratory: Yes: CTA Bilaterally


Gastrointestinal Inspection: Yes: Ascites, Distention (softyly distended)


...Auscultate: Yes: Normoactive Bowel Sounds


...Palpate: Yes: Soft, Tenderness (diffuse tenderness to palpation)


...Percussion: No: Tympanitic


Edema: No (No LE edema)


Neurological: Yes: Alert.  No: Asterixis


Labs: 


                                    CBC, BMP





                                 20 07:10 





                                 20 07:10 





                                    INR, PTT











INR  1.46  (0.83-1.09)  H  20  18:37    








                                  Hepatic Panel











Total Bilirubin  3.3 mg/dL (0.2-1)  H  20  07:10    


 


AST  73 U/L (15-37)  H  20  07:10    


 


ALT  22 U/L (13-61)   20  07:10    


 


Alkaline Phosphatase  143 U/L ()  H  20  07:10    


 


Albumin  2.0 g/dl (3.4-5.0)  L  20  07:10    














Problem List





- Problems


(1) Abdominal pain


Assessment/Plan: 


Unclear etiology. No fevers, leukocytosis. Cannot exclude SBP, however as pain 

persists, would start empiric antibiotic therapy Cefotaxime 2g Q12 h, ontain 

triple phase CT scan of the A/P, NPO except meds, obtain ID consult


Diagnostic paracentesis for culture, Cell count w/ dif


NPO except meds to to continued pain.  Should not be on regular diet once able 

to eat. Should be 2g Na controlled. 


Daily weights, I's and O's


Aldactone 100 mg daily, Lasix 40mg daily. change Lasix to IVP for now


Will need Q6 Month hepatic US to screen for HCC


Advised patient that she needs to take responsibility for her medical problems. 

She needs regular follow-up with her PMD, needs to completely abstain from 

alcohol and needs to have regular follow-up at a liver center.  Her PMD is 

affiliated with [facility].  I advise that she follow-up with her to clarify her 

follow-up with the [facility] liver center. 














Code(s): R10.9 - UNSPECIFIED ABDOMINAL PAIN   


Qualifiers: 


   Abdominal location: generalized   Qualified Code(s): R10.84 - Generalized 

abdominal pain

## 2020-07-24 NOTE — PN
Progress Note, Physician


History of Present Illness: 





STABLE





- Current Medication List


Current Medications: 


Active Medications





Furosemide (Lasix -)  20 mg PO BIDLASIX [facility]


   Last Admin: 07/24/20 06:25 Dose:  20 mg


   Documented by: 


Heparin Sodium (Porcine) (Heparin -)  5,000 unit SQ BID [facility]


   Last Admin: 07/24/20 09:26 Dose:  5,000 unit


   Documented by: 


Morphine Sulfate (Morphine Sulfate)  2 mg IVPUSH Q8H PRN


   PRN Reason: PAIN LEVEL 6-10


   Last Admin: 07/24/20 09:27 Dose:  2 mg


   Documented by: 


Polyethylene Glycol (Miralax (For Daily Use) -)  17 gm PO DAILY [facility]


   Last Admin: 07/24/20 09:35 Dose:  Not Given


   Documented by: 


Spironolactone (Aldactone -)  100 mg PO DAILY [facility]


   Last Admin: 07/24/20 09:27 Dose:  Not Given


   Documented by: 











- Objective


Vital Signs: 


                                   Vital Signs











Temperature  98.1 F   07/24/20 09:39


 


Pulse Rate  73   07/24/20 09:39


 


Respiratory Rate  19   07/24/20 09:39


 


Blood Pressure  97/50 L  07/24/20 09:39


 


O2 Sat by Pulse Oximetry (%)  99   07/24/20 09:39











Constitutional: Yes: No Distress


HENT: Yes: Atraumatic


Neck: Yes: Supple


Cardiovascular: Yes: Regular Rate and Rhythm


Respiratory: Yes: CTA Bilaterally


Gastrointestinal: Yes: Distention


Extremities: Yes: WNL


Neurological: Yes: Alert, Oriented


Labs: 


                                    CBC, BMP





                                 07/24/20 07:10 





                                 07/24/20 07:10 





                                    INR, PTT











INR  1.46  (0.83-1.09)  H  07/22/20  18:37    














Problem List





- Problems


(1) Abdominal pain


Code(s): R10.9 - UNSPECIFIED ABDOMINAL PAIN   


Qualifiers: 


   Abdominal location: generalized   Qualified Code(s): R10.84 - Generalized 

abdominal pain   





(2) Ascites


Assessment/Plan: 


for paracentesis as per gi


patient said she had paracentesis yesterday in ER


sbp?


npo


id consult


Code(s): R18.8 - OTHER ASCITES   


Qualifiers: 


   Ascites type: due to alcoholic hepatitis   Qualified Code(s): K70.11 - 

Alcoholic hepatitis with ascites   





(3) Alcoholic cirrhosis


Assessment/Plan: 


pt said she stopped drinking long time ago


Code(s): K70.30 - ALCOHOLIC CIRRHOSIS OF LIVER WITHOUT ASCITES   


Qualifiers: 


   Ascites presence: with ascites   Qualified Code(s): K70.31 - Alcoholic 

cirrhosis of liver with ascites   





(4) Hypokalemia


Assessment/Plan: 


replace


fu labs


Code(s): E87.6 - HYPOKALEMIA   





(5) Anemia


Code(s): D64.9 - ANEMIA, UNSPECIFIED   





(6) S/P gastric bypass


Code(s): Z98.84 - BARIATRIC SURGERY STATUS   





Assessment/Plan





COVERING FOR DR MERINO TODAY

## 2020-07-25 LAB
ALBUMIN SERPL-MCNC: 2 G/DL (ref 3.4–5)
ALP SERPL-CCNC: 138 U/L (ref 45–117)
ALT SERPL-CCNC: 22 U/L (ref 13–61)
ANION GAP SERPL CALC-SCNC: 6 MMOL/L (ref 8–16)
AST SERPL-CCNC: 73 U/L (ref 15–37)
BASOPHILS # BLD: 1.1 % (ref 0–2)
BILIRUB SERPL-MCNC: 3.2 MG/DL (ref 0.2–1)
BUN SERPL-MCNC: 8.6 MG/DL (ref 7–18)
CALCIUM SERPL-MCNC: 8 MG/DL (ref 8.5–10.1)
CHLORIDE SERPL-SCNC: 104 MMOL/L (ref 98–107)
CO2 SERPL-SCNC: 29 MMOL/L (ref 21–32)
CREAT SERPL-MCNC: 0.8 MG/DL (ref 0.55–1.3)
DEPRECATED RDW RBC AUTO: 27 % (ref 11.6–15.6)
EOSINOPHIL # BLD: 0.9 % (ref 0–4.5)
GLUCOSE SERPL-MCNC: 74 MG/DL (ref 74–106)
HCT VFR BLD CALC: 29.9 % (ref 32.4–45.2)
HGB BLD-MCNC: 9.8 GM/DL (ref 10.7–15.3)
LYMPHOCYTES # BLD: 37.7 % (ref 8–40)
MCH RBC QN AUTO: 27.8 PG (ref 25.7–33.7)
MCHC RBC AUTO-ENTMCNC: 32.9 G/DL (ref 32–36)
MCV RBC: 84.6 FL (ref 80–96)
MONOCYTES # BLD AUTO: 6.7 % (ref 3.8–10.2)
NEUTROPHILS # BLD: 53.6 % (ref 42.8–82.8)
PLATELET # BLD AUTO: 106 K/MM3 (ref 134–434)
PMV BLD: 8.8 FL (ref 7.5–11.1)
POTASSIUM SERPLBLD-SCNC: 3.4 MMOL/L (ref 3.5–5.1)
PROT SERPL-MCNC: 6.5 G/DL (ref 6.4–8.2)
RBC # BLD AUTO: 3.54 M/MM3 (ref 3.6–5.2)
SODIUM SERPL-SCNC: 139 MMOL/L (ref 136–145)
WBC # BLD AUTO: 5.7 K/MM3 (ref 4–10)

## 2020-07-25 RX ADMIN — POLYETHYLENE GLYCOL 3350 SCH: 17 POWDER, FOR SOLUTION ORAL at 10:43

## 2020-07-25 RX ADMIN — POLYETHYLENE GLYCOL 3350 SCH GM: 17 POWDER, FOR SOLUTION ORAL at 22:01

## 2020-07-25 RX ADMIN — CEFTRIAXONE SCH MLS/HR: 1 INJECTION, POWDER, FOR SOLUTION INTRAMUSCULAR; INTRAVENOUS at 05:06

## 2020-07-25 RX ADMIN — CEFTRIAXONE SCH MLS/HR: 1 INJECTION, POWDER, FOR SOLUTION INTRAMUSCULAR; INTRAVENOUS at 17:13

## 2020-07-25 RX ADMIN — MORPHINE SULFATE PRN MG: 2 INJECTION, SOLUTION INTRAMUSCULAR; INTRAVENOUS at 10:35

## 2020-07-25 RX ADMIN — SPIRONOLACTONE SCH MG: 25 TABLET, FILM COATED ORAL at 10:32

## 2020-07-25 RX ADMIN — FUROSEMIDE SCH MG: 10 INJECTION, SOLUTION INTRAVENOUS at 10:32

## 2020-07-25 RX ADMIN — HEPARIN SODIUM SCH UNIT: 5000 INJECTION, SOLUTION INTRAVENOUS; SUBCUTANEOUS at 10:33

## 2020-07-25 RX ADMIN — HEPARIN SODIUM SCH UNIT: 5000 INJECTION, SOLUTION INTRAVENOUS; SUBCUTANEOUS at 22:01

## 2020-07-25 RX ADMIN — MORPHINE SULFATE PRN MG: 2 INJECTION, SOLUTION INTRAMUSCULAR; INTRAVENOUS at 03:48

## 2020-07-25 NOTE — PN
Progress Note, Physician


History of Present Illness: 





No still w/ some abdominal discomfort





- Current Medication List


Current Medications: 


Active Medications





Furosemide (Lasix Injection -)  40 mg IVPUSH DAILY [facility]


   Last Admin: 07/25/20 10:32 Dose:  40 mg


   Documented by: 


Heparin Sodium (Porcine) (Heparin -)  5,000 unit SQ BID [facility]


   Last Admin: 07/25/20 22:01 Dose:  5,000 unit


   Documented by: 


Ceftriaxone Sodium 1 gm/ (Dextrose)  50 mls @ 100 mls/hr IVPB Q12H [facility]


   Last Admin: 07/25/20 17:13 Dose:  100 mls/hr


   Documented by: 


Oxycodone HCl (Roxicodone -)  10 mg PO Q6H PRN


   PRN Reason: PAIN LEVEL 4 - 6


   Last Admin: 07/25/20 20:30 Dose:  10 mg


   Documented by: 


Polyethylene Glycol (Miralax (For Daily Use) -)  17 gm PO BID [facility]


   Last Admin: 07/25/20 22:01 Dose:  17 gm


   Documented by: 


Spironolactone (Aldactone -)  100 mg PO DAILY [facility]


   Last Admin: 07/25/20 10:32 Dose:  100 mg


   Documented by: 











- Objective


Vital Signs: 


                                   Vital Signs











Temperature  98.0 F   07/25/20 18:34


 


Pulse Rate  84   07/25/20 18:34


 


Respiratory Rate  17   07/25/20 18:34


 


Blood Pressure  107/62   07/25/20 18:34


 


O2 Sat by Pulse Oximetry (%)  100   07/25/20 18:34











Cardiovascular: Yes: WNL, Regular Rate and Rhythm


Respiratory: Yes: WNL, Regular, CTA Bilaterally


Gastrointestinal: Yes: Normal Bowel Sounds, Soft, Abdomen, Obese, Ascites


Labs: 


                                    CBC, BMP





                                 07/25/20 07:45 





                                 07/25/20 07:45 





                                    INR, PTT











INR  1.46  (0.83-1.09)  H  07/22/20  18:37    














Problem List





- Problems


(1) Ascites


Assessment/Plan: 


Due to cirrhosis


Repeat US guided paracentesis 


Cont IV lasix/spironolactone


Monitor electrolytes


Cont IV ceftriaxone to r/o SBP


Code(s): R18.8 - OTHER ASCITES   


Qualifiers: 


   Ascites type: other type   Qualified Code(s): R18.8 - Other ascites   





(2) Alcoholic cirrhosis


Code(s): K70.30 - ALCOHOLIC CIRRHOSIS OF LIVER WITHOUT ASCITES   


Qualifiers: 


   Ascites presence: with ascites   Qualified Code(s): K70.31 - Alcoholic 

cirrhosis of liver with ascites   





(3) Anemia


Code(s): D64.9 - ANEMIA, UNSPECIFIED   





(4) Hyperbilirubinemia


Code(s): E80.6 - OTHER DISORDERS OF BILIRUBIN METABOLISM   





(5) S/P gastric bypass


Code(s): Z98.84 - BARIATRIC SURGERY STATUS

## 2020-07-25 NOTE — CON.ID
Consult


Consult Specialty:: infectious diseases


Referred by:: 


Reason for Consultation:: abd distension and tenderness





- History of Present Illness


Chief Complaint: abd pain


History of Present Illness: 





40 year old female with a significant past medical history of alcohol cirrhosis 

(on transplant list, paracentesis Q2 Months, last drained ) who presents to 

the ED with 2 days of progressively worsening abdominal distention. Denies F/C. 

Denies N/V/constipation. Denies any significant pain but endorses discomfort 

from being so distended.





according to the patient she was having distension and was tapped 2 days b

ack--says again the fluid has filled up and the pain has been bad.


denies fever,nausea or vomiting


 Was hospitalized here  for similar presentation but acalculous 

cholecystitis could not be substantiated.  


continues to have abd pain this morning 


no fevers





- History Source


History Provided By: Patient


Limitations to Obtaining History: No Limitations





- Past Medical History


Gastrointestinal: Yes: Other (s/p gastric bypass  [facility] with serial 

postop dilations)


Hepatobiliary: Yes: Cirrhosis (Alcoholic cirrhosis with ascites on [facility] Liver 

Transplant List), Hepatitis B (status to be determined, has core antibody)


...LMP: 19


Infectious Disease: Yes: MRSA (history of)


Psych: Yes: Addictions (alcohol)





- Past Surgical History


Past Surgical History: Yes: Appendectomy, Bariatric Surgery (laparoscopic 

gastric bypass  [place]), , Tubal Ligation, Upper Endoscopy





- Alcohol/Substance Use


Hx Alcohol Use: Yes


History of Substance Use: reports: None





- Smoking History


Smoking history: Never smoked


Have you smoked in the past 12 months: No


Aproximately how many cigarettes per day: 2


If you are a former smoker, when did you quit?: quit with alcohol 





- Social History


Usual Living Arrangement: With Significant Other


ADL: Independent


Occupation: disabled


History of Recent Travel: No





Home Medications





- Allergies


Allergies/Adverse Reactions: 


                                    Allergies











Allergy/AdvReac Type Severity Reaction Status Date / Time


 


No Known Allergies Allergy   Verified 20 16:53














- Home Medications


Home Medications: 


Ambulatory Orders





Mv-Mn/Iron/FA/Herbal/Digestive [Prenatal One Tablet] 1 tab PO DAILY 19 


Furosemide [Lasix] 20 mg PO BID #60 tablet 20 


Polyethylene Glycol 3350 [Miralax 119 gm Btl -] 17 gm PO BID  bottle 20 


Polyethylene Glycol 3350 [Miralax 119 gm Btl -] 17 gm PO DAILY #1 bottle 

20 


Spironolactone 100 gm MC DAILY #30 powder 20 


Furosemide [Lasix -] 20 mg PO BID@0600,1400 #60 tablet 20 


Spironolactone [Aldactone -] 100 mg PO DAILY #30 tablet 20 











Review of Systems





- Review of Systems


Constitutional: reports: No Symptoms


Eyes: reports: No Symptoms


HENT: reports: No Symptoms


Neck: reports: No Symptoms


Cardiovascular: reports: No Symptoms


Respiratory: reports: No Symptoms


Gastrointestinal: reports: Abdominal Pain, Other


Genitourinary: reports: No Symptoms


Musculoskeletal: reports: No Symptoms


Integumentary: reports: No Symptoms


Neurological: reports: No Symptoms


Endocrine: reports: No Symptoms, Unexplained Weight Gain


Psychiatric: reports: No Symptoms





Physical Exam


Vital Signs: 


                                   Vital Signs











Temperature  97.6 F   20 09:00


 


Pulse Rate  61   20 09:00


 


Respiratory Rate  18   20 09:00


 


Blood Pressure  99/55 L  20 09:00


 


O2 Sat by Pulse Oximetry (%)  100   20 21:00











Constitutional: Yes: Well Nourished, Calm, Moderate Distress


Eyes: Yes: Conjunctiva Clear


HENT: Yes: Atraumatic, Normocephalic


Neck: Yes: Supple, Trachea Midline


Cardiovascular: Yes: Regular Rate and Rhythm


Respiratory: Yes: Regular, CTA Bilaterally


Gastrointestinal: Yes: Soft, Ascites, Distention, Tenderness


Musculoskeletal: Yes: WNL


Extremities: Yes: WNL


Neurological: Yes: Alert, Oriented


Psychiatric: Yes: Alert, Oriented


Labs: 


                                    CBC, BMP





                                 20 07:45 





                                 20 07:45 











Imaging





- Results


Cat Scan: Report Reviewed, Image Reviewed





Assessment/Plan








Problem List





- Problems


(1) Ascites


Code(s): R18.8 - OTHER ASCITES   


Qualifiers: 


   Ascites type: other type   Qualified Code(s): R18.8 - Other ascites   





(2) Alcoholic cirrhosis


Code(s): K70.30 - ALCOHOLIC CIRRHOSIS OF LIVER WITHOUT ASCITES   


Qualifiers: 


   Ascites presence: with ascites   Qualified Code(s): K70.31 - Alcoholic ci

rrhosis of liver with ascites   





(3) Anemia


Code(s): D64.9 - ANEMIA, UNSPECIFIED   





(4) Hyperbilirubinemia


Code(s): E80.6 - OTHER DISORDERS OF BILIRUBIN METABOLISM   





(5) S/P gastric bypass


Code(s): Z98.84 - BARIATRIC SURGERY STATUS 





patient has been started on ceftriaxone


we will continue that


pain mgmt


will need repeat tapping and recx of fluid


monitor for fevers or increased wbc

## 2020-07-25 NOTE — PN.GI
GI Progress Note





- Objective


Vital Signs: 


                                   Vital Signs











Temperature  97.6 F   07/25/20 10:00


 


Pulse Rate  61   07/25/20 10:00


 


Respiratory Rate  18   07/25/20 10:00


 


Blood Pressure  99/55 L  07/25/20 10:00


 


O2 Sat by Pulse Oximetry (%)  99   07/25/20 10:00








                                Laboratory Tests











  06/27/19 07/01/19 07/01/19





  19:40 07:06 10:08


 


Plt Count   


 


PT with INR   


 


Potassium   


 


Ferritin   256.4 


 


Total Bilirubin   


 


AST   


 


ALT   


 


Alkaline Phosphatase   


 


RICKY Screen    Negative


 


Smooth Musc &RNP Intrp    14


 


COVID-19 ([facility])   


 


Hep A IgM Ab Confirm  Negative  


 


Hepatitis A Ab Total  Negative  


 


Hep Bs Antigen  Negative  


 


Hep Bs Antibody  Non-reactive  


 


Hep B Core Total Ab  Positive H  


 


Hep B Core IgM Ab  Negative  


 


Hep B DNA (Units/mL)   


 


Hepatitis Be Antibody  Negative  


 


Hepatitis Be Antigen  Negative  


 


Hep C Ab Diagnostic  <0.1  














  03/08/20 07/22/20 07/22/20





  07:18 18:37 18:37


 


Plt Count   


 


PT with INR    17.30 H


 


Potassium   


 


Ferritin   


 


Total Bilirubin   5.2 H 


 


AST   81 H 


 


ALT   26 


 


Alkaline Phosphatase   160 H 


 


RICKY Screen   


 


Smooth Musc &RNP Intrp   


 


COVID-19 ([facility]AS[facility])   


 


Hep A IgM Ab Confirm   


 


Hepatitis A Ab Total   


 


Hep Bs Antigen   


 


Hep Bs Antibody   


 


Hep B Core Total Ab   


 


Hep B Core IgM Ab   


 


Hep B DNA (Units/mL)  Hbv dna not detected  


 


Hepatitis Be Antibody   


 


Hepatitis Be Antigen   


 


Hep C Ab Diagnostic   














  07/22/20 07/25/20 07/25/20





  18:37 07:45 07:45


 


Plt Count   106 L 


 


PT with INR   


 


Potassium    3.4 L


 


Ferritin   


 


Total Bilirubin    3.2 H


 


AST    73 H


 


ALT    22


 


Alkaline Phosphatase    138 H


 


RICKY Screen   


 


Smooth Musc &RNP Intrp   


 


COVID-19 ([facility])  Not detected  


 


Hep A IgM Ab Confirm   


 


Hepatitis A Ab Total   


 


Hep Bs Antigen   


 


Hep Bs Antibody   


 


Hep B Core Total Ab   


 


Hep B Core IgM Ab   


 


Hep B DNA (Units/mL)   


 


Hepatitis Be Antibody   


 


Hepatitis Be Antigen   


 


Hep C Ab Diagnostic   











Constitutional: Anxious


Eyes: Yes: Sclera Icterus


Gastrointestinal Inspection: Yes: Distention


...Auscultate: Yes: Hypoactive Bowel Sounds


...Palpate: Yes: Soft, Other (nontender)


Labs: 


                                    CBC, BMP





                                 07/25/20 07:45 





                                 07/25/20 07:45 





                                    INR, PTT











INR  1.46  (0.83-1.09)  H  07/22/20  18:37    














Assessment/Plan





Impression:


- Receiving antibiotics for SBP and awaiting diagnostic paracentesis


- Alcoholic cirrhosis with ascites 


- Alcohol abstension since 7/18 ?








Problem List





- Problems


(1) Spontaneous bacterial peritonitis


Code(s): K65.2 - SPONTANEOUS BACTERIAL PERITONITIS   





(2) Abdominal pain


Code(s): R10.9 - UNSPECIFIED ABDOMINAL PAIN   


Qualifiers: 


   Abdominal location: generalized   Qualified Code(s): R10.84 - Generalized 

abdominal pain   





(3) Ascites


Code(s): R18.8 - OTHER ASCITES   


Qualifiers: 


   Ascites type: due to alcoholic hepatitis   Qualified Code(s): K70.11 - 

Alcoholic hepatitis with ascites   





(4) Alcoholic cirrhosis


Code(s): K70.30 - ALCOHOLIC CIRRHOSIS OF LIVER WITHOUT ASCITES   


Qualifiers: 


   Ascites presence: with ascites   Qualified Code(s): K70.31 - Alcoholic 

cirrhosis of liver with ascites   





(5) Jaundice, hepatocellular


Code(s): K76.89 - OTHER SPECIFIED DISEASES OF LIVER   





(6) Microcytic hypochromic anemia


Code(s): D50.9 - IRON DEFICIENCY ANEMIA, UNSPECIFIED   





(7) S/P gastric bypass


Code(s): Z98.84 - BARIATRIC SURGERY STATUS

## 2020-07-25 NOTE — PN.GI
GI Progress Note


Subjective: 


GI NOte ( covering Dr Lepe): Pain is improved. Discussed the need to avoid 

salt to control her ascites and propensity to SBP which is causing her pain. If 

diuretics fail she may be candidate for TIPS. She tells me that she has 

abstained from alcohol since 7/18.  





- Objective


Vital Signs: 


                                   Vital Signs











Temperature  97.6 F   07/25/20 10:00


 


Pulse Rate  61   07/25/20 10:00


 


Respiratory Rate  18   07/25/20 10:00


 


Blood Pressure  99/55 L  07/25/20 10:00


 


O2 Sat by Pulse Oximetry (%)  99   07/25/20 10:00











Constitutional: Anxious


Eyes: Yes: Sclera Icterus


...Auscultate: Yes: Normoactive Bowel Sounds


...Palpate: Yes: Soft, Other (nontender)


Labs: 


                                    CBC, BMP





                                 07/25/20 07:45 





                                 07/25/20 07:45 





                                    INR, PTT











INR  1.46  (0.83-1.09)  H  07/22/20  18:37    














Assessment/Plan





Impression:


- Receiving antibiotics for SBP and awaiting diagnostic paracentesis


- Alcoholic cirrhosis with ascites 


- Alcohol abstention since 7/18 ?





Plan:


-- Continue antibiotics


-- Full liquids. Advance diet as tolerated


-- Morphine stopped. Oxycodone prn


-- Continue spironolactone and lasix








Problem List





- Problems


(1) Spontaneous bacterial peritonitis


Code(s): K65.2 - SPONTANEOUS BACTERIAL PERITONITIS   





(2) Abdominal pain


Code(s): R10.9 - UNSPECIFIED ABDOMINAL PAIN   


Qualifiers: 


   Abdominal location: generalized   Qualified Code(s): R10.84 - Generalized 

abdominal pain   





(3) Ascites


Code(s): R18.8 - OTHER ASCITES   


Qualifiers: 


   Ascites type: due to alcoholic hepatitis   Qualified Code(s): K70.11 - 

Alcoholic hepatitis with ascites   





(4) Alcoholic cirrhosis


Code(s): K70.30 - ALCOHOLIC CIRRHOSIS OF LIVER WITHOUT ASCITES   


Qualifiers: 


   Ascites presence: with ascites   Qualified Code(s): K70.31 - Alcoholic 

cirrhosis of liver with ascites   





(5) Jaundice, hepatocellular


Code(s): K76.89 - OTHER SPECIFIED DISEASES OF LIVER   





(6) S/P gastric bypass


Code(s): Z98.84 - BARIATRIC SURGERY STATUS   





(7) Anemia


Code(s): D64.9 - ANEMIA, UNSPECIFIED

## 2020-07-26 LAB
ALBUMIN SERPL-MCNC: 2.3 G/DL (ref 3.4–5)
ALP SERPL-CCNC: 161 U/L (ref 45–117)
ALP SERPL-CCNC: 163 U/L (ref 45–117)
ALT SERPL-CCNC: 26 U/L (ref 13–61)
ALT SERPL-CCNC: 26 U/L (ref 13–61)
AMYLASE SERPL-CCNC: 141 U/L (ref 25–115)
ANION GAP SERPL CALC-SCNC: 5 MMOL/L (ref 8–16)
ANISOCYTOSIS BLD QL: (no result)
AST SERPL-CCNC: 76 U/L (ref 15–37)
BASOPHILS # BLD: 1 % (ref 0–2)
BILIRUB CONJ SERPL-MCNC: 1.8 MG/DL (ref 0–0.2)
BILIRUB SERPL-MCNC: 2.8 MG/DL (ref 0.2–1)
BILIRUB SERPL-MCNC: 3 MG/DL (ref 0.2–1)
BUN SERPL-MCNC: 13.4 MG/DL (ref 7–18)
CALCIUM SERPL-MCNC: 8.2 MG/DL (ref 8.5–10.1)
CHLORIDE SERPL-SCNC: 100 MMOL/L (ref 98–107)
CO2 SERPL-SCNC: 31 MMOL/L (ref 21–32)
CREAT SERPL-MCNC: 1 MG/DL (ref 0.55–1.3)
DEPRECATED RDW RBC AUTO: 27.1 % (ref 11.6–15.6)
EOSINOPHIL # BLD: 0.7 % (ref 0–4.5)
GLUCOSE SERPL-MCNC: 77 MG/DL (ref 74–106)
HCT VFR BLD CALC: 31.2 % (ref 32.4–45.2)
HGB BLD-MCNC: 10.2 GM/DL (ref 10.7–15.3)
LIPASE SERPL-CCNC: 488 U/L (ref 73–393)
LYMPHOCYTES # BLD: 35.1 % (ref 8–40)
MACROCYTES BLD QL: (no result)
MCH RBC QN AUTO: 28.4 PG (ref 25.7–33.7)
MCHC RBC AUTO-ENTMCNC: 32.8 G/DL (ref 32–36)
MCV RBC: 86.4 FL (ref 80–96)
MONOCYTES # BLD AUTO: 5.5 % (ref 3.8–10.2)
NEUTROPHILS # BLD: 57.7 % (ref 42.8–82.8)
PLATELET # BLD AUTO: 117 K/MM3 (ref 134–434)
PLATELET BLD QL SMEAR: (no result)
PMV BLD: 9.1 FL (ref 7.5–11.1)
POTASSIUM SERPLBLD-SCNC: 3.6 MMOL/L (ref 3.5–5.1)
PROT SERPL-MCNC: 7.5 G/DL (ref 6.4–8.2)
PROT SERPL-MCNC: 7.7 G/DL (ref 6.4–8.2)
RBC # BLD AUTO: 3.61 M/MM3 (ref 3.6–5.2)
SODIUM SERPL-SCNC: 136 MMOL/L (ref 136–145)
WBC # BLD AUTO: 6.9 K/MM3 (ref 4–10)

## 2020-07-26 RX ADMIN — HEPARIN SODIUM SCH UNIT: 5000 INJECTION, SOLUTION INTRAVENOUS; SUBCUTANEOUS at 21:10

## 2020-07-26 RX ADMIN — CEFTRIAXONE SCH MLS/HR: 1 INJECTION, POWDER, FOR SOLUTION INTRAMUSCULAR; INTRAVENOUS at 18:29

## 2020-07-26 RX ADMIN — SPIRONOLACTONE SCH MG: 25 TABLET, FILM COATED ORAL at 09:17

## 2020-07-26 RX ADMIN — FUROSEMIDE SCH MG: 10 INJECTION, SOLUTION INTRAVENOUS at 09:17

## 2020-07-26 RX ADMIN — HEPARIN SODIUM SCH UNIT: 5000 INJECTION, SOLUTION INTRAVENOUS; SUBCUTANEOUS at 09:17

## 2020-07-26 RX ADMIN — CEFTRIAXONE SCH MLS/HR: 1 INJECTION, POWDER, FOR SOLUTION INTRAMUSCULAR; INTRAVENOUS at 05:45

## 2020-07-26 RX ADMIN — POLYETHYLENE GLYCOL 3350 SCH GM: 17 POWDER, FOR SOLUTION ORAL at 21:09

## 2020-07-26 RX ADMIN — POLYETHYLENE GLYCOL 3350 SCH: 17 POWDER, FOR SOLUTION ORAL at 09:23

## 2020-07-26 NOTE — PN
Progress Note, Physician


History of Present Illness: 





Pt feels abdomen slightly more distended





- Current Medication List


Current Medications: 


Active Medications





Furosemide (Lasix Injection -)  40 mg IVPUSH DAILY [facility]


   Last Admin: 07/26/20 09:17 Dose:  40 mg


   Documented by: 


Heparin Sodium (Porcine) (Heparin -)  5,000 unit SQ BID [facility]


   Last Admin: 07/26/20 21:10 Dose:  5,000 unit


   Documented by: 


Ceftriaxone Sodium 1 gm/ (Dextrose)  50 mls @ 100 mls/hr IVPB Q12H [facility]


   Last Admin: 07/26/20 18:29 Dose:  100 mls/hr


   Documented by: 


Oxycodone HCl (Roxicodone -)  10 mg PO Q6H PRN


   PRN Reason: PAIN LEVEL 4 - 6


   Last Admin: 07/26/20 20:36 Dose:  10 mg


   Documented by: 


Polyethylene Glycol (Miralax (For Daily Use) -)  17 gm PO BID [facility]


   Last Admin: 07/26/20 21:09 Dose:  17 gm


   Documented by: 


Spironolactone (Aldactone -)  100 mg PO DAILY [facility]


   Last Admin: 07/26/20 09:17 Dose:  100 mg


   Documented by: 











- Objective


Vital Signs: 


                                   Vital Signs











Temperature  98.1 F   07/26/20 22:00


 


Pulse Rate  85   07/26/20 22:00


 


Respiratory Rate  18   07/26/20 22:00


 


Blood Pressure  107/64   07/26/20 22:00


 


O2 Sat by Pulse Oximetry (%)  100   07/26/20 22:00











Cardiovascular: Yes: WNL, Regular Rate and Rhythm


Respiratory: Yes: WNL, Regular, CTA Bilaterally


Gastrointestinal: Yes: Normal Bowel Sounds, Soft, Abdomen, Obese, Ascites


Labs: 


                                    CBC, BMP





                                 07/26/20 06:30 





                                 07/26/20 06:30 





                                    INR, PTT











INR  1.46  (0.83-1.09)  H  07/22/20  18:37    














Problem List





- Problems


(1) Ascites


Assessment/Plan: 


Due to cirrhosis


Repeat US guided paracentesis in am


Cont IV lasix/spironolactone


Monitor electrolytes


Cont IV ceftriaxone to r/o SBP


Code(s): R18.8 - OTHER ASCITES   


Qualifiers: 


   Ascites type: other type   Qualified Code(s): R18.8 - Other ascites   





(2) Alcoholic cirrhosis


Assessment/Plan: 


Long d/w pt about need for close f/u. Pt states that she is followed at 

[facility] however it is unclear if she is on a transplant list?


Code(s): K70.30 - ALCOHOLIC CIRRHOSIS OF LIVER WITHOUT ASCITES   


Qualifiers: 


   Ascites presence: with ascites   Qualified Code(s): K70.31 - Alcoholic 

cirrhosis of liver with ascites   





(3) Anemia


Code(s): D64.9 - ANEMIA, UNSPECIFIED   





(4) Hyperbilirubinemia


Code(s): E80.6 - OTHER DISORDERS OF BILIRUBIN METABOLISM   





(5) S/P gastric bypass


Code(s): Z98.84 - BARIATRIC SURGERY STATUS

## 2020-07-26 NOTE — PN.GI
GI Progress Note


Subjective: 





GI NOte ( covering Dr Lepe): Tolerating soft diet but wants more selection 

so will advance to regular with sodium restriction. No pain 





- Objective


Vital Signs: 


                                   Vital Signs











Temperature  98.1 F   07/26/20 09:33


 


Pulse Rate  71   07/26/20 09:33


 


Respiratory Rate  15   07/26/20 09:33


 


Blood Pressure  97/58 L  07/26/20 09:33


 


O2 Sat by Pulse Oximetry (%)  100   07/26/20 09:33








CBC,CMP











WBC  6.9 K/mm3 (4.0-10.0)   07/26/20  06:30    


 


RBC  3.61 M/mm3 (3.60-5.2)   07/26/20  06:30    


 


Hgb  10.2 GM/dL (10.7-15.3)  L  07/26/20  06:30    


 


Hct  31.2 % (32.4-45.2)  L  07/26/20  06:30    


 


MCV  86.4 fl (80-96)   07/26/20  06:30    


 


MCH  28.4 pg (25.7-33.7)   07/26/20  06:30    


 


MCHC  32.8 g/dl (32.0-36.0)   07/26/20  06:30    


 


RDW  27.1 % (11.6-15.6)  H  07/26/20  06:30    


 


Plt Count  117 K/MM3 (134-434)  L  07/26/20  06:30    


 


MPV  9.1 fl (7.5-11.1)   07/26/20  06:30    


 


Absolute Neuts (auto)  4.0 K/mm3 (1.5-8.0)   07/26/20  06:30    


 


Neutrophils %  57.7 % (42.8-82.8)   07/26/20  06:30    


 


Lymphocytes %  35.1 % (8-40)   07/26/20  06:30    


 


Monocytes %  5.5 % (3.8-10.2)   07/26/20  06:30    


 


Eosinophils %  0.7 % (0-4.5)   07/26/20  06:30    


 


Basophils %  1.0 % (0-2.0)   07/26/20  06:30    


 


Nucleated RBC %  0 % (0-0)   07/26/20  06:30    


 


Hypochromia  0   07/26/20  06:30    


 


Platelet Estimate  Decreased   07/26/20  06:30    


 


Polychromasia  0   07/26/20  06:30    


 


Poikilocytosis  0   07/26/20  06:30    


 


Anisocytosis  2+   07/26/20  06:30    


 


Microcytosis  0   07/26/20  06:30    


 


Macrocytosis  1+   07/26/20  06:30    


 


Rouleaux  2+   07/22/20  18:37    


 


Retic Count  1.18 % (0.5-1.5)  D 07/26/20  06:30    


 


Sodium  136 mmol/L (136-145)   07/26/20  06:30    


 


Potassium  3.6 mmol/L (3.5-5.1)   07/26/20  06:30    


 


Chloride  100 mmol/L ()   07/26/20  06:30    


 


Carbon Dioxide  31 mmol/L (21-32)   07/26/20  06:30    


 


Anion Gap  5 MMOL/L (8-16)  L  07/26/20  06:30    


 


BUN  13.4 mg/dL (7-18)   07/26/20  06:30    


 


Creatinine  1.0 mg/dL (0.55-1.3)   07/26/20  06:30    


 


Est GFR (CKD-EPI)AfAm  81.61   07/26/20  06:30    


 


Est GFR (CKD-EPI)NonAf  70.41   07/26/20  06:30    


 


Random Glucose  77 mg/dL ()   07/26/20  06:30    


 


Calcium  8.2 mg/dL (8.5-10.1)  L  07/26/20  06:30    


 


Total Bilirubin  2.8 mg/dL (0.2-1)  H  07/26/20  06:30    


 


Total Bilirubin  3.0 mg/dL (0.2-1)  H  07/26/20  06:30    


 


Direct Bilirubin  1.8 mg/dL (0.0-0.2)  H  07/26/20  06:30    


 


AST  76 U/L (15-37)  H  07/26/20  06:30    


 


AST  77 U/L (15-37)  H  07/26/20  06:30    


 


ALT  26 U/L (13-61)   07/26/20  06:30    


 


ALT  26 U/L (13-61)   07/26/20  06:30    


 


Alkaline Phosphatase  161 U/L ()  H  07/26/20  06:30    


 


Alkaline Phosphatase  163 U/L ()  H  07/26/20  06:30    


 


Total Protein  7.5 g/dl (6.4-8.2)   07/26/20  06:30    


 


Total Protein  7.7 g/dl (6.4-8.2)   07/26/20  06:30    


 


Albumin  2.2 g/dl (3.4-5.0)  L  07/26/20  06:30    


 


Albumin  2.3 g/dl (3.4-5.0)  L  07/26/20  06:30    


 


Total Amylase  141 U/L ()  H  07/26/20  06:30    


 


Lipase  488 U/L ()  H  07/26/20  06:30    


 


Vitamin B12  948 pg/ml (193-986)   07/26/20  06:30    


 


Serum Folate  7 ng/mL (3.1-17.5)   07/26/20  06:30    











Constitutional: Calm


Gastrointestinal Inspection: Yes: Distention, Other (palpable lymph node vs 

lipoma just superior to the umbilicus ? [facility] node)


...Auscultate: Yes: Normoactive Bowel Sounds


...Palpate: Yes: Soft, Other (nontender)


...Percussion: Yes: Tympanitic


Labs: 


                                    CBC, BMP





                                 07/26/20 06:30 





                                 07/26/20 06:30 





                                    INR, PTT











INR  1.46  (0.83-1.09)  H  07/22/20  18:37    














Assessment/Plan





Impression:


- SBP responding to antibiotics


- Supraumbilical node in the setting of cirrhosis and new liver masses is 

worrisome. AFP is pending. I have asked Lupis to bring this mass to 


Dr Wolff's attention when having her paracentesis to determine whether or not 

to biopsy it . This would be safer than a liver biopsy





Plan: 


-- Regular sodum restricted diet


-- Paracentesis and perhaps a biopsy of supraumbilical node. Will give Vitamin K





Problem List





- Problems


(1) Abdominal wall mass of periumbilical region


Code(s): R19.05 - PERIUMBILIC SWELLING, MASS OR LUMP   





(2) Spontaneous bacterial peritonitis


Code(s): K65.2 - SPONTANEOUS BACTERIAL PERITONITIS   





(3) Abdominal pain


Code(s): R10.9 - UNSPECIFIED ABDOMINAL PAIN   


Qualifiers: 


   Abdominal location: generalized   Qualified Code(s): R10.84 - Generalized 

abdominal pain   





(4) Ascites


Code(s): R18.8 - OTHER ASCITES   


Qualifiers: 


   Ascites type: due to alcoholic hepatitis   Qualified Code(s): K70.11 - 

Alcoholic hepatitis with ascites   





(5) Alcoholic cirrhosis


Code(s): K70.30 - ALCOHOLIC CIRRHOSIS OF LIVER WITHOUT ASCITES   


Qualifiers: 


   Ascites presence: with ascites   Qualified Code(s): K70.31 - Alcoholic 

cirrhosis of liver with ascites   





(6) Jaundice, hepatocellular


Code(s): K76.89 - OTHER SPECIFIED DISEASES OF LIVER   





(7) S/P gastric bypass


Code(s): Z98.84 - BARIATRIC SURGERY STATUS   





(8) Anemia


Code(s): D64.9 - ANEMIA, UNSPECIFIED   





(9) Liver masses


Code(s): R16.0 - HEPATOMEGALY, NOT ELSEWHERE CLASSIFIED

## 2020-07-27 VITALS — HEART RATE: 68 BPM | TEMPERATURE: 97.8 F | SYSTOLIC BLOOD PRESSURE: 148 MMHG | DIASTOLIC BLOOD PRESSURE: 91 MMHG

## 2020-07-27 LAB
ALBUMIN SERPL-MCNC: 2 G/DL (ref 3.4–5)
ALP SERPL-CCNC: 132 U/L (ref 45–117)
ALT SERPL-CCNC: 22 U/L (ref 13–61)
ANION GAP SERPL CALC-SCNC: 5 MMOL/L (ref 8–16)
AST SERPL-CCNC: 70 U/L (ref 15–37)
BASOPHILS # BLD: 1.3 % (ref 0–2)
BILIRUB SERPL-MCNC: 2 MG/DL (ref 0.2–1)
BUN SERPL-MCNC: 14.3 MG/DL (ref 7–18)
CALCIUM SERPL-MCNC: 8 MG/DL (ref 8.5–10.1)
CHLORIDE SERPL-SCNC: 103 MMOL/L (ref 98–107)
CO2 SERPL-SCNC: 29 MMOL/L (ref 21–32)
CREAT SERPL-MCNC: 1.1 MG/DL (ref 0.55–1.3)
DEPRECATED RDW RBC AUTO: 26.9 % (ref 11.6–15.6)
EOSINOPHIL # BLD: 1.1 % (ref 0–4.5)
GLUCOSE SERPL-MCNC: 71 MG/DL (ref 74–106)
HCT VFR BLD CALC: 27.6 % (ref 32.4–45.2)
HGB BLD-MCNC: 9.2 GM/DL (ref 10.7–15.3)
INR BLD: 1.3 (ref 0.83–1.09)
LYMPHOCYTES # BLD: 43.5 % (ref 8–40)
MCH RBC QN AUTO: 28.6 PG (ref 25.7–33.7)
MCHC RBC AUTO-ENTMCNC: 33.4 G/DL (ref 32–36)
MCV RBC: 85.7 FL (ref 80–96)
MESOTHL CELL NFR PLR MANUAL: 82 %
MONOCYTES # BLD AUTO: 6.7 % (ref 3.8–10.2)
NEUTROPHILS # BLD: 47.4 % (ref 42.8–82.8)
NUC CELL # FLD: 124 /MM3
PLATELET # BLD AUTO: 97 K/MM3 (ref 134–434)
PMV BLD: 9.1 FL (ref 7.5–11.1)
POTASSIUM SERPLBLD-SCNC: 4 MMOL/L (ref 3.5–5.1)
PROT SERPL-MCNC: 6.5 G/DL (ref 6.4–8.2)
PT PNL PPP: 15.4 SEC (ref 9.7–13)
RBC # BLD AUTO: 3.23 M/MM3 (ref 3.6–5.2)
SODIUM SERPL-SCNC: 136 MMOL/L (ref 136–145)
WBC # BLD AUTO: 5.4 K/MM3 (ref 4–10)

## 2020-07-27 PROCEDURE — 0W9G3ZX DRAINAGE OF PERITONEAL CAVITY, PERCUTANEOUS APPROACH, DIAGNOSTIC: ICD-10-PCS | Performed by: RADIOLOGY

## 2020-07-27 RX ADMIN — CEFTRIAXONE SCH MLS/HR: 1 INJECTION, POWDER, FOR SOLUTION INTRAMUSCULAR; INTRAVENOUS at 18:30

## 2020-07-27 RX ADMIN — HEPARIN SODIUM SCH: 5000 INJECTION, SOLUTION INTRAVENOUS; SUBCUTANEOUS at 09:45

## 2020-07-27 RX ADMIN — POLYETHYLENE GLYCOL 3350 SCH: 17 POWDER, FOR SOLUTION ORAL at 09:45

## 2020-07-27 RX ADMIN — CEFTRIAXONE SCH MLS/HR: 1 INJECTION, POWDER, FOR SOLUTION INTRAMUSCULAR; INTRAVENOUS at 05:06

## 2020-07-27 RX ADMIN — FUROSEMIDE SCH MG: 10 INJECTION, SOLUTION INTRAVENOUS at 10:37

## 2020-07-27 RX ADMIN — SPIRONOLACTONE SCH MG: 25 TABLET, FILM COATED ORAL at 10:37

## 2020-07-27 NOTE — PN
Progress Note, Physician


History of Present Illness: 





stable


still with abd pain





- Current Medication List


Current Medications: 


Active Medications





Furosemide (Lasix Injection -)  40 mg IVPUSH DAILY [facility]


   Last Admin: 07/26/20 09:17 Dose:  40 mg


   Documented by: 


Heparin Sodium (Porcine) (Heparin -)  5,000 unit SQ BID [facility]


   Last Admin: 07/27/20 09:45 Dose:  Not Given


   Documented by: 


Ceftriaxone Sodium 1 gm/ (Dextrose)  50 mls @ 100 mls/hr IVPB Q12H [facility]


   Last Admin: 07/27/20 05:06 Dose:  100 mls/hr


   Documented by: 


Oxycodone HCl (Roxicodone -)  10 mg PO Q6H PRN


   PRN Reason: PAIN LEVEL 4 - 6


   Last Admin: 07/27/20 08:35 Dose:  10 mg


   Documented by: 


Polyethylene Glycol (Miralax (For Daily Use) -)  17 gm PO BID [facility]


   Last Admin: 07/27/20 09:45 Dose:  Not Given


   Documented by: 


Spironolactone (Aldactone -)  100 mg PO DAILY [facility]


   Last Admin: 07/26/20 09:17 Dose:  100 mg


   Documented by: 











- Objective


Vital Signs: 


                                   Vital Signs











Temperature  98.2 F   07/27/20 08:41


 


Pulse Rate  71   07/27/20 08:41


 


Respiratory Rate  18   07/27/20 08:41


 


Blood Pressure  94/57 L  07/27/20 08:41


 


O2 Sat by Pulse Oximetry (%)  99   07/27/20 08:41











Constitutional: Yes: No Distress, Calm


Cardiovascular: Yes: S1, S2


Respiratory: Yes: Regular, CTA Bilaterally


Gastrointestinal: Yes: Ascites, Distention, Other


Musculoskeletal: Yes: WNL


Extremities: Yes: WNL


Psychiatric: Yes: Alert, Oriented


Labs: 


                                    CBC, BMP





                                 07/27/20 06:56 





                                 07/27/20 06:56 





                                    INR, PTT











INR  1.30  (0.83-1.09)  H  07/27/20  06:56    














Assessment/Plan








Problem List





- Problems


(1) Ascites


Code(s): R18.8 - OTHER ASCITES   


Qualifiers: 


   Ascites type: other type   Qualified Code(s): R18.8 - Other ascites   





(2) Alcoholic cirrhosis


Code(s): K70.30 - ALCOHOLIC CIRRHOSIS OF LIVER WITHOUT ASCITES   


Qualifiers: 


   Ascites presence: with ascites   Qualified Code(s): K70.31 - Alcoholic ci

rrhosis of liver with ascites   





(3) Anemia


Code(s): D64.9 - ANEMIA, UNSPECIFIED   





(4) Hyperbilirubinemia


Code(s): E80.6 - OTHER DISORDERS OF BILIRUBIN METABOLISM   





(5) S/P gastric bypass


Code(s): Z98.84 - BARIATRIC SURGERY STATUS 





continue current mgmt


abx


rest as per the team

## 2020-07-27 NOTE — PN.GI
GI Progress Note


Subjective: 





CT scan findings noted:


+ hepatic masses, ascites, ? periumbilical nodule (?cyst/hernia/node)


Abdominal pain improved











- Objective


Vital Signs: 


                                   Vital Signs











Temperature  98.2 F   07/27/20 08:41


 


Pulse Rate  82   07/27/20 10:00


 


Respiratory Rate  18   07/27/20 10:00


 


Blood Pressure  106/72   07/27/20 10:00


 


O2 Sat by Pulse Oximetry (%)  99   07/27/20 08:41











Constitutional: Calm


Eyes: No: Sclera Icterus


Cardiovascular: Yes: Regular Rate and Rhythm


Respiratory: Yes: CTA Bilaterally


Gastrointestinal Inspection: Yes: Distention


...Auscultate: Yes: Normoactive Bowel Sounds


...Palpate: Yes: Soft, Tenderness (Mild TTP mid abdomen).  No: Other


...Percussion: No: Tympanitic


Edema: No (No LE edema)


Neurological: Yes: Alert


Labs: 


                                    CBC, BMP





                                 07/27/20 06:56 





                                 07/27/20 06:56 





                                    INR, PTT











INR  1.30  (0.83-1.09)  H  07/27/20  06:56    














Problem List





- Problems


(1) Abdominal pain


Assessment/Plan: 


Foar diagnostic paracentesis today. Asked nurse to include cytology to the 

orders as it cannot be ordered via computer


Discussed findings with Ms. Gann and potential diagnosis of cancer. Agreeable

to transfer to [facility] for further evaluation


Discussed case with transplant attending at [facility] Dr. Victoriano Huber. Accepted Ms. Gann for transfer





Code(s): R10.9 - UNSPECIFIED ABDOMINAL PAIN   


Qualifiers: 


   Abdominal location: generalized   Qualified Code(s): R10.84 - Generalized 

abdominal pain

## 2020-07-27 NOTE — PN
Progress Note (short form)





- Note


Progress Note: 





Patient needs updated COVID-19 testing prior to transfer per Dr. Huber from 

[facility]. Ordered repeat test for today. Otherwise continued medical management until

transfer is feasible, await diagnostic paracentesis, cytology





Problem List





- Problems


(1) Abdominal pain


Code(s): R10.9 - UNSPECIFIED ABDOMINAL PAIN   


Qualifiers: 


   Abdominal location: generalized   Qualified Code(s): R10.84 - Generalized 

abdominal pain

## 2020-07-28 LAB — ALBUMIN MFR FLD ELPH: 0.8 G/DL

## 2020-07-28 NOTE — PATH
Cytology Non-Gynecological Report



Patient Name:  SANCHEZ QUIROGA

Accession #:  

[facility]. Rec. #:  [id number]                                                        

   /Age/Gender:  1980 (Age: 40) / F

Account:  [id number]                                                          

             Location: [address] [place] [address]/[facility]

Taken:  2020

Received:  2020

Reported:  2020

Physicians:  Shasta Lemons M.D.

  



Specimen(s) Received

A: PERITONEAL FLUID IN ALCOHOL 

B: PERITONEAL FLUID FRESH 





Clinical History

Ascites







Final Diagnosis

A&B: ABDOMINAL FLUID, PARACENTESIS:

SATISFACTORY FOR EVALUATION.

NEGATIVE FOR MALIGNANCY.

MESOTHELIAL CELLS, MACROPHAGES, AND LYMPHOCYTES PRESENT.







***Electronically Signed***

Eugenia Ball M.D.





Gross Description

A.  Approximately 60cc of yellow fluid received fixed in 50% alcohol.  One

cytospin and one cellblock prepared.

B.  Approximately 89639ar of yellow fluid received fresh.  One cytospin and one

cellblock prepared.

## 2021-06-09 NOTE — PN.GI
Patient Stated Goal: Prevent further stones  Steps for collecting a 24 hour urine specimen    Please follow the directions carefully. All urine voided for a 24-hour period needs to be collected into the jug.  DO NOT change any of your  normal  daily habits when doing this test. Continue to follow your regular diet, intake of fluids, and usual activity level. Pick the most convenient day with your schedule, perhaps on a weekend or a day off.    Start your Diet Log the day before collection and continue on the day of urine collection.  You MUST bring Diet Log with you on follow up visit to discuss results.    One 24hr Urine Collection     Two 24hr Urine Collections  (do not collect on consecutive days)    PLEASE COMPLETE THE 2nd JUG WITHIN 1-2 WEEKS FROM THE 1st JUG    STEP 1  Empty your bladder completely into the toilet. This will be your start time. Write your full legal name, start date and time on the jug label.  Collection start and stop times need to match exactly!  For example:  6 am to 6 am.    STEP 2  The next time you urinate, empty your bladder directly into the jug or collection hat and pour urine into the jug.  Screw the lid back onto the jug.  Do not spill!    STEP 3  Place the jug in the refrigerator or a cooler with ice during the collection period.  Failure to keep it cool could cause inaccurate test results. DO NOT Freeze.    STEP 4  Continue collecting all urine into the jug for the rest of the day, for the full 24 hours.  DO NOT stop early or go over 24 hours!    STEP 5  Exactly 24 hours from start of collection, write your full legal name, stop date and time on the jug label.   Collection start and stop times need to match exactly!  For example:  6 am to 6 am.  Failure to label correctly will result in recollection of urine specimen.    STEP 6  Return each jug within 24 hours after final urination.     STEP 7  Drop off jug locations:   [facility]: Mon-Fri 7am-7pm - Closed on  GI Progress Note


Subjective: 





No acute events


5L paracentesis today


SAAG 1.6 from initial paracentesis. Total protein from that tap not collected.








- Objective


Vital Signs: 


                                   Vital Signs











Temperature  97.8 F   03/10/20 14:15


 


Pulse Rate  81   03/10/20 14:15


 


Respiratory Rate  18   03/10/20 14:15


 


Blood Pressure  102/53 L  03/10/20 14:15


 


O2 Sat by Pulse Oximetry (%)  99   03/09/20 21:00











Constitutional: Calm


Eyes: No: Sclera Icterus


Cardiovascular: Yes: Regular Rate and Rhythm.  No: Murmur


Respiratory: Yes: CTA Bilaterally


Gastrointestinal Inspection: Yes: Distention


...Auscultate: Yes: Normoactive Bowel Sounds


...Palpate: Yes: Soft.  No: Hepatomegaly, Splenomegaly, Tenderness


...Percussion: No: Tympanitic


Edema: No (No LE edema)


Neurological: Yes: Alert.  No: Asterixis


Labs: 


                                    CBC, BMP





                                 03/09/20 07:35 





                                 03/09/20 07:35 





                                    INR, PTT











INR  1.44  (0.83-1.09)  H  03/06/20  10:40    














Problem List





- Problems


(1) Alcoholic cirrhosis


Assessment/Plan: 


with sequela of recurrent ascites


Sees fellow Dr. Nguyen/attending Dr. Bravo - [facility]


States having had EGD 8/19, does not recall the result


Advised need for continued follow-up at [facility] for continued 

management of her volume status / need for serial paracenteses / eventual TIPS 

evaluation if needed.  She states that she believes that she is listed for 

transplant there.  I advised that she clarify this when she follows up there.


Lasix 40mg daily, Aldactone 100g daily


2g low sodium diet. Emphasized the need for her to adhere to low sodium diet


q 6 month US to screen for HCC


  


Code(s): K70.30 - ALCOHOLIC CIRRHOSIS OF LIVER WITHOUT ASCITES weekends  [facility]: Mon-Fri 7am-5pm - Closed on Sunday  [facility]: Mon-Fri 7am-6:30pm - Closed on weekends    STEP 8  Please call [facility] after return of your final jug to schedule your follow-up visit. [phone]

## 2022-05-04 NOTE — PN.GI
GI Progress Note


Subjective: 





still with moderate to severe abdominal pain, MRI--normal CBD, d/w with Dr Ugarte no evidence acute cholecystitis


s/p gastric bypass lost 213 lbs, WBC 5000, 





- Objective


Vital Signs: 


 Vital Signs











Temperature  98.1 F   06/28/19 13:30


 


Pulse Rate  96 H  06/28/19 13:30


 


Respiratory Rate  18   06/28/19 13:30


 


Blood Pressure  123/88   06/28/19 13:30


 


O2 Sat by Pulse Oximetry (%)  99   06/28/19 09:00











Constitutional: Well Nourished


Eyes: Yes: Conjunctiva Clear


HENT: Yes: Atraumatic, Tonsillar Exudate


Cardiovascular: Yes: Regular Rate and Rhythm


Respiratory: Yes: CTA Bilaterally


...Palpate: Yes: Soft, Tenderness (--diffuse).  No: Firm/Rigid, Guarding, 

Hepatomegaly, Mass, Pulsatile Mass, Splenomegaly


Labs: 


 CBC, BMP





 06/28/19 06:20 





 06/28/19 06:20 





 INR, PTT











INR  2.19  (0.83-1.09)  H  06/28/19  06:20    














Problem List





- Problems


(1) Colitis


Assessment/Plan: 


Abdominal pain is persistent doubt if this all from acute colitis,possibly 

patient has underlying mesenteric ischemia, marginal ulcer





R> Pantoprazole 40mg bid


   IV hydration


   continue antibiotics


   surgical follow -up


Code(s): K52.9 - NONINFECTIVE GASTROENTERITIS AND COLITIS, UNSPECIFIED Ms. Bhagat [place] tolerating infusion of Rituxan at 50 ml/hr, she denies any distress and none is noted, IV site is unremarkable, increased rate to 83 ml/hr, will continue with current plan of care.